# Patient Record
Sex: FEMALE | Race: WHITE | Employment: UNEMPLOYED
[De-identification: names, ages, dates, MRNs, and addresses within clinical notes are randomized per-mention and may not be internally consistent; named-entity substitution may affect disease eponyms.]

---

## 2017-02-10 ENCOUNTER — OFFICE VISIT (OUTPATIENT)
Dept: FAMILY MEDICINE CLINIC | Facility: CLINIC | Age: 67
End: 2017-02-10

## 2017-02-10 VITALS
TEMPERATURE: 97.4 F | WEIGHT: 202 LBS | HEIGHT: 66 IN | BODY MASS INDEX: 32.47 KG/M2 | DIASTOLIC BLOOD PRESSURE: 80 MMHG | SYSTOLIC BLOOD PRESSURE: 130 MMHG | OXYGEN SATURATION: 99 % | RESPIRATION RATE: 18 BRPM | HEART RATE: 74 BPM

## 2017-02-10 DIAGNOSIS — M76.892 HIP TENDONITIS, LEFT: ICD-10-CM

## 2017-02-10 DIAGNOSIS — Z76.89 ENCOUNTER TO ESTABLISH CARE: ICD-10-CM

## 2017-02-10 DIAGNOSIS — I10 ESSENTIAL HYPERTENSION: Primary | ICD-10-CM

## 2017-02-10 DIAGNOSIS — Z12.11 SCREENING FOR MALIGNANT NEOPLASM OF COLON: ICD-10-CM

## 2017-02-10 PROBLEM — M76.899 HIP TENDONITIS: Status: ACTIVE | Noted: 2017-02-10

## 2017-02-10 PROCEDURE — 99203 OFFICE O/P NEW LOW 30 MIN: CPT | Performed by: NURSE PRACTITIONER

## 2017-02-10 RX ORDER — LISINOPRIL AND HYDROCHLOROTHIAZIDE 20; 12.5 MG/1; MG/1
1 TABLET ORAL DAILY
Qty: 30 TABLET | Refills: 5 | Status: SHIPPED | OUTPATIENT
Start: 2017-02-10 | End: 2017-08-11 | Stop reason: SDUPTHER

## 2017-02-10 RX ORDER — LISINOPRIL AND HYDROCHLOROTHIAZIDE 20; 12.5 MG/1; MG/1
TABLET ORAL
Refills: 0 | COMMUNITY
Start: 2017-01-15 | End: 2017-02-10 | Stop reason: SDUPTHER

## 2017-02-10 RX ORDER — METHYLPREDNISOLONE 4 MG/1
TABLET ORAL
Qty: 1 KIT | Refills: 0 | Status: SHIPPED | OUTPATIENT
Start: 2017-02-10 | End: 2017-02-16

## 2017-02-10 ASSESSMENT — ENCOUNTER SYMPTOMS
CONSTIPATION: 0
BLOOD IN STOOL: 0
DIARRHEA: 0
COUGH: 0
EYE DISCHARGE: 0
SINUS PRESSURE: 0
ABDOMINAL PAIN: 0
CHEST TIGHTNESS: 0
SHORTNESS OF BREATH: 0
RHINORRHEA: 0

## 2017-02-10 ASSESSMENT — PATIENT HEALTH QUESTIONNAIRE - PHQ9
1. LITTLE INTEREST OR PLEASURE IN DOING THINGS: 0
2. FEELING DOWN, DEPRESSED OR HOPELESS: 0
SUM OF ALL RESPONSES TO PHQ QUESTIONS 1-9: 0
SUM OF ALL RESPONSES TO PHQ9 QUESTIONS 1 & 2: 0

## 2017-03-13 ENCOUNTER — OFFICE VISIT (OUTPATIENT)
Dept: FAMILY MEDICINE CLINIC | Age: 67
End: 2017-03-13

## 2017-03-13 VITALS
RESPIRATION RATE: 20 BRPM | SYSTOLIC BLOOD PRESSURE: 122 MMHG | OXYGEN SATURATION: 100 % | WEIGHT: 194 LBS | HEART RATE: 67 BPM | BODY MASS INDEX: 31.31 KG/M2 | DIASTOLIC BLOOD PRESSURE: 80 MMHG | TEMPERATURE: 98.2 F

## 2017-03-13 DIAGNOSIS — Z23 NEED FOR PNEUMOCOCCAL VACCINATION: ICD-10-CM

## 2017-03-13 DIAGNOSIS — M76.892 HIP TENDONITIS, LEFT: Primary | ICD-10-CM

## 2017-03-13 DIAGNOSIS — R10.13 EPIGASTRIC PAIN: ICD-10-CM

## 2017-03-13 PROCEDURE — 99213 OFFICE O/P EST LOW 20 MIN: CPT | Performed by: NURSE PRACTITIONER

## 2017-03-13 RX ORDER — CYCLOBENZAPRINE HCL 5 MG
5 TABLET ORAL EVERY 8 HOURS PRN
Qty: 30 TABLET | Refills: 1 | Status: SHIPPED | OUTPATIENT
Start: 2017-03-13 | End: 2017-06-01 | Stop reason: SDUPTHER

## 2017-03-13 RX ORDER — HYDROCODONE BITARTRATE AND ACETAMINOPHEN 5; 325 MG/1; MG/1
TABLET ORAL
Qty: 40 TABLET | Refills: 0 | Status: SHIPPED | OUTPATIENT
Start: 2017-03-13 | End: 2017-05-23 | Stop reason: SDUPTHER

## 2017-03-13 ASSESSMENT — ENCOUNTER SYMPTOMS: BACK PAIN: 1

## 2017-03-20 DIAGNOSIS — Z12.39 SCREENING FOR BREAST CANCER: Primary | ICD-10-CM

## 2017-03-23 ENCOUNTER — OFFICE VISIT (OUTPATIENT)
Dept: FAMILY MEDICINE CLINIC | Age: 67
End: 2017-03-23

## 2017-03-23 VITALS
RESPIRATION RATE: 18 BRPM | SYSTOLIC BLOOD PRESSURE: 127 MMHG | HEART RATE: 85 BPM | DIASTOLIC BLOOD PRESSURE: 78 MMHG | BODY MASS INDEX: 32.44 KG/M2 | WEIGHT: 201 LBS | OXYGEN SATURATION: 98 %

## 2017-03-23 DIAGNOSIS — M16.12 OSTEOARTHRITIS OF LEFT HIP, UNSPECIFIED OSTEOARTHRITIS TYPE: ICD-10-CM

## 2017-03-23 DIAGNOSIS — M76.892 HIP TENDONITIS, LEFT: Primary | ICD-10-CM

## 2017-03-23 PROCEDURE — 99213 OFFICE O/P EST LOW 20 MIN: CPT | Performed by: NURSE PRACTITIONER

## 2017-03-23 ASSESSMENT — ENCOUNTER SYMPTOMS
EYE DISCHARGE: 0
BLOOD IN STOOL: 0
COUGH: 0
ABDOMINAL PAIN: 0
CHEST TIGHTNESS: 0
CONSTIPATION: 0
DIARRHEA: 0
SINUS PRESSURE: 0
SHORTNESS OF BREATH: 0
RHINORRHEA: 0

## 2017-03-24 DIAGNOSIS — Z12.11 SCREENING FOR MALIGNANT NEOPLASM OF COLON: ICD-10-CM

## 2017-03-24 LAB
CONTROL: PRESENT
HEMOCCULT STL QL: NEGATIVE

## 2017-03-24 PROCEDURE — 82274 ASSAY TEST FOR BLOOD FECAL: CPT | Performed by: NURSE PRACTITIONER

## 2017-03-27 ENCOUNTER — TELEPHONE (OUTPATIENT)
Dept: FAMILY MEDICINE CLINIC | Age: 67
End: 2017-03-27

## 2017-03-28 ENCOUNTER — TELEPHONE (OUTPATIENT)
Dept: ORTHOPEDIC SURGERY | Age: 67
End: 2017-03-28

## 2017-04-17 DIAGNOSIS — M25.552 LEFT HIP PAIN: Primary | ICD-10-CM

## 2017-04-19 ENCOUNTER — OFFICE VISIT (OUTPATIENT)
Dept: ORTHOPEDIC SURGERY | Age: 67
End: 2017-04-19
Payer: MEDICARE

## 2017-04-19 DIAGNOSIS — M47.817 FACET ARTHRITIS OF LUMBOSACRAL REGION: ICD-10-CM

## 2017-04-19 DIAGNOSIS — M16.12 OSTEOARTHRITIS OF LEFT HIP, UNSPECIFIED OSTEOARTHRITIS TYPE: Primary | ICD-10-CM

## 2017-04-19 PROCEDURE — 1090F PRES/ABSN URINE INCON ASSESS: CPT | Performed by: ORTHOPAEDIC SURGERY

## 2017-04-19 PROCEDURE — 4040F PNEUMOC VAC/ADMIN/RCVD: CPT | Performed by: ORTHOPAEDIC SURGERY

## 2017-04-19 PROCEDURE — 1036F TOBACCO NON-USER: CPT | Performed by: ORTHOPAEDIC SURGERY

## 2017-04-19 PROCEDURE — G8427 DOCREV CUR MEDS BY ELIG CLIN: HCPCS | Performed by: ORTHOPAEDIC SURGERY

## 2017-04-19 PROCEDURE — 3014F SCREEN MAMMO DOC REV: CPT | Performed by: ORTHOPAEDIC SURGERY

## 2017-04-19 PROCEDURE — G8417 CALC BMI ABV UP PARAM F/U: HCPCS | Performed by: ORTHOPAEDIC SURGERY

## 2017-04-19 PROCEDURE — 99203 OFFICE O/P NEW LOW 30 MIN: CPT | Performed by: ORTHOPAEDIC SURGERY

## 2017-04-19 PROCEDURE — 3017F COLORECTAL CA SCREEN DOC REV: CPT | Performed by: ORTHOPAEDIC SURGERY

## 2017-04-19 RX ORDER — MELOXICAM 15 MG/1
15 TABLET ORAL DAILY
Qty: 30 TABLET | Refills: 3 | Status: SHIPPED | OUTPATIENT
Start: 2017-04-19 | End: 2017-08-04 | Stop reason: SDUPTHER

## 2017-04-19 ASSESSMENT — ENCOUNTER SYMPTOMS
ANAL BLEEDING: 0
BLOOD IN STOOL: 0
ABDOMINAL PAIN: 0
COLOR CHANGE: 0
NAUSEA: 0
COUGH: 0
RECTAL PAIN: 0
VOMITING: 0
WHEEZING: 0
DIARRHEA: 0
BACK PAIN: 0
CONSTIPATION: 0

## 2017-05-23 ENCOUNTER — OFFICE VISIT (OUTPATIENT)
Dept: FAMILY MEDICINE CLINIC | Age: 67
End: 2017-05-23
Payer: MEDICARE

## 2017-05-23 VITALS
RESPIRATION RATE: 18 BRPM | SYSTOLIC BLOOD PRESSURE: 122 MMHG | HEART RATE: 76 BPM | BODY MASS INDEX: 32.78 KG/M2 | HEIGHT: 66 IN | OXYGEN SATURATION: 96 % | WEIGHT: 204 LBS | DIASTOLIC BLOOD PRESSURE: 85 MMHG | TEMPERATURE: 97.6 F

## 2017-05-23 DIAGNOSIS — M16.12 OSTEOARTHRITIS OF LEFT HIP, UNSPECIFIED OSTEOARTHRITIS TYPE: Primary | ICD-10-CM

## 2017-05-23 DIAGNOSIS — J01.10 ACUTE NON-RECURRENT FRONTAL SINUSITIS: ICD-10-CM

## 2017-05-23 PROCEDURE — 99214 OFFICE O/P EST MOD 30 MIN: CPT | Performed by: NURSE PRACTITIONER

## 2017-05-23 RX ORDER — HYDROCODONE BITARTRATE AND ACETAMINOPHEN 5; 325 MG/1; MG/1
TABLET ORAL
Qty: 40 TABLET | Refills: 0 | Status: SHIPPED | OUTPATIENT
Start: 2017-05-23 | End: 2017-08-14 | Stop reason: SDUPTHER

## 2017-05-23 RX ORDER — AMOXICILLIN AND CLAVULANATE POTASSIUM 875; 125 MG/1; MG/1
1 TABLET, FILM COATED ORAL 2 TIMES DAILY
Qty: 20 TABLET | Refills: 0 | Status: SHIPPED | OUTPATIENT
Start: 2017-05-23 | End: 2017-06-02

## 2017-05-23 ASSESSMENT — ENCOUNTER SYMPTOMS
CONSTIPATION: 0
DIARRHEA: 0
SINUS PRESSURE: 1
COUGH: 0
CHEST TIGHTNESS: 0
RHINORRHEA: 1
ABDOMINAL PAIN: 0
BLOOD IN STOOL: 0
EYE DISCHARGE: 0
SHORTNESS OF BREATH: 0

## 2017-06-01 RX ORDER — CYCLOBENZAPRINE HCL 5 MG
TABLET ORAL
Qty: 30 TABLET | Refills: 1 | Status: SHIPPED | OUTPATIENT
Start: 2017-06-01 | End: 2018-01-30 | Stop reason: ALTCHOICE

## 2017-08-04 DIAGNOSIS — M16.12 OSTEOARTHRITIS OF LEFT HIP, UNSPECIFIED OSTEOARTHRITIS TYPE: ICD-10-CM

## 2017-08-06 RX ORDER — MELOXICAM 15 MG/1
TABLET ORAL
Qty: 30 TABLET | Refills: 3 | Status: SHIPPED | OUTPATIENT
Start: 2017-08-06 | End: 2017-11-30 | Stop reason: SDUPTHER

## 2017-08-11 RX ORDER — LISINOPRIL AND HYDROCHLOROTHIAZIDE 20; 12.5 MG/1; MG/1
TABLET ORAL
Qty: 30 TABLET | Refills: 5 | Status: SHIPPED | OUTPATIENT
Start: 2017-08-11 | End: 2017-12-11 | Stop reason: SDUPTHER

## 2017-08-14 ENCOUNTER — TELEPHONE (OUTPATIENT)
Dept: FAMILY MEDICINE CLINIC | Age: 67
End: 2017-08-14

## 2017-08-14 RX ORDER — HYDROCODONE BITARTRATE AND ACETAMINOPHEN 5; 325 MG/1; MG/1
TABLET ORAL
Qty: 40 TABLET | Refills: 0 | Status: SHIPPED | OUTPATIENT
Start: 2017-08-14 | End: 2017-09-30 | Stop reason: SDUPTHER

## 2017-08-21 ENCOUNTER — OFFICE VISIT (OUTPATIENT)
Dept: FAMILY MEDICINE CLINIC | Age: 67
End: 2017-08-21
Payer: MEDICARE

## 2017-08-21 VITALS
RESPIRATION RATE: 18 BRPM | OXYGEN SATURATION: 99 % | BODY MASS INDEX: 32.62 KG/M2 | DIASTOLIC BLOOD PRESSURE: 82 MMHG | WEIGHT: 203 LBS | TEMPERATURE: 97.5 F | HEIGHT: 66 IN | SYSTOLIC BLOOD PRESSURE: 138 MMHG | HEART RATE: 72 BPM

## 2017-08-21 DIAGNOSIS — Z00.00 HEALTHCARE MAINTENANCE: ICD-10-CM

## 2017-08-21 DIAGNOSIS — Z13.820 ENCOUNTER FOR SCREENING FOR OSTEOPOROSIS: ICD-10-CM

## 2017-08-21 DIAGNOSIS — Z23 ENCOUNTER FOR IMMUNIZATION: ICD-10-CM

## 2017-08-21 DIAGNOSIS — I10 ESSENTIAL HYPERTENSION: Primary | ICD-10-CM

## 2017-08-21 PROCEDURE — 90715 TDAP VACCINE 7 YRS/> IM: CPT | Performed by: NURSE PRACTITIONER

## 2017-08-21 PROCEDURE — 90688 IIV4 VACCINE SPLT 0.5 ML IM: CPT | Performed by: NURSE PRACTITIONER

## 2017-08-21 PROCEDURE — 90471 IMMUNIZATION ADMIN: CPT | Performed by: NURSE PRACTITIONER

## 2017-08-21 PROCEDURE — 3017F COLORECTAL CA SCREEN DOC REV: CPT | Performed by: NURSE PRACTITIONER

## 2017-08-21 PROCEDURE — 90670 PCV13 VACCINE IM: CPT | Performed by: NURSE PRACTITIONER

## 2017-08-21 PROCEDURE — G8400 PT W/DXA NO RESULTS DOC: HCPCS | Performed by: NURSE PRACTITIONER

## 2017-08-21 PROCEDURE — 1090F PRES/ABSN URINE INCON ASSESS: CPT | Performed by: NURSE PRACTITIONER

## 2017-08-21 PROCEDURE — G8427 DOCREV CUR MEDS BY ELIG CLIN: HCPCS | Performed by: NURSE PRACTITIONER

## 2017-08-21 PROCEDURE — G0008 ADMIN INFLUENZA VIRUS VAC: HCPCS | Performed by: NURSE PRACTITIONER

## 2017-08-21 PROCEDURE — 99213 OFFICE O/P EST LOW 20 MIN: CPT | Performed by: NURSE PRACTITIONER

## 2017-08-21 PROCEDURE — 1123F ACP DISCUSS/DSCN MKR DOCD: CPT | Performed by: NURSE PRACTITIONER

## 2017-08-21 PROCEDURE — 4040F PNEUMOC VAC/ADMIN/RCVD: CPT | Performed by: NURSE PRACTITIONER

## 2017-08-21 PROCEDURE — G8417 CALC BMI ABV UP PARAM F/U: HCPCS | Performed by: NURSE PRACTITIONER

## 2017-08-21 PROCEDURE — 1036F TOBACCO NON-USER: CPT | Performed by: NURSE PRACTITIONER

## 2017-08-21 PROCEDURE — 3014F SCREEN MAMMO DOC REV: CPT | Performed by: NURSE PRACTITIONER

## 2017-08-21 PROCEDURE — G0009 ADMIN PNEUMOCOCCAL VACCINE: HCPCS | Performed by: NURSE PRACTITIONER

## 2017-08-21 ASSESSMENT — ENCOUNTER SYMPTOMS
BLOOD IN STOOL: 0
CONSTIPATION: 0
COUGH: 0
SINUS PRESSURE: 0
RHINORRHEA: 0
SHORTNESS OF BREATH: 0
DIARRHEA: 0
ABDOMINAL PAIN: 0
CHEST TIGHTNESS: 0

## 2017-08-31 ENCOUNTER — HOSPITAL ENCOUNTER (OUTPATIENT)
Age: 67
Setting detail: SPECIMEN
Discharge: HOME | End: 2017-08-31
Payer: MEDICARE

## 2017-08-31 DIAGNOSIS — Z00.00 HEALTHCARE MAINTENANCE: ICD-10-CM

## 2017-08-31 LAB
ESTIMATED AVERAGE GLUCOSE: 117 MG/DL
HBA1C MFR BLD: 5.7 % (ref 4–6)
HEPATITIS C ANTIBODY: NONREACTIVE
HIV AG/AB: NONREACTIVE

## 2017-09-19 DIAGNOSIS — M81.0 OSTEOPOROSIS, UNSPECIFIED OSTEOPOROSIS TYPE, UNSPECIFIED PATHOLOGICAL FRACTURE PRESENCE: Primary | ICD-10-CM

## 2017-09-19 DIAGNOSIS — Z78.0 MENOPAUSE: ICD-10-CM

## 2017-09-21 ENCOUNTER — HOSPITAL ENCOUNTER (OUTPATIENT)
Dept: WOMENS IMAGING | Age: 67
Discharge: HOME | End: 2017-09-21
Payer: MEDICARE

## 2017-09-21 DIAGNOSIS — M81.0 OSTEOPOROSIS, UNSPECIFIED OSTEOPOROSIS TYPE, UNSPECIFIED PATHOLOGICAL FRACTURE PRESENCE: ICD-10-CM

## 2017-09-21 DIAGNOSIS — M85.80 OSTEOPENIA, UNSPECIFIED LOCATION: Primary | ICD-10-CM

## 2017-09-21 DIAGNOSIS — Z78.0 MENOPAUSE: ICD-10-CM

## 2017-09-21 PROCEDURE — 77080 DXA BONE DENSITY AXIAL: CPT

## 2017-09-21 RX ORDER — CALCIUM CARBONATE/VITAMIN D3 600 MG-10
1 TABLET ORAL DAILY
Qty: 30 TABLET | Refills: 2 | Status: SHIPPED | OUTPATIENT
Start: 2017-09-21 | End: 2017-12-11 | Stop reason: SDUPTHER

## 2017-09-29 ENCOUNTER — TELEPHONE (OUTPATIENT)
Dept: FAMILY MEDICINE CLINIC | Age: 67
End: 2017-09-29

## 2017-09-30 RX ORDER — HYDROCODONE BITARTRATE AND ACETAMINOPHEN 5; 325 MG/1; MG/1
TABLET ORAL
Qty: 20 TABLET | Refills: 0 | Status: SHIPPED | OUTPATIENT
Start: 2017-09-30 | End: 2017-11-07 | Stop reason: SDUPTHER

## 2017-10-11 ENCOUNTER — OFFICE VISIT (OUTPATIENT)
Dept: ORTHOPEDIC SURGERY | Age: 67
End: 2017-10-11
Payer: MEDICARE

## 2017-10-11 VITALS
WEIGHT: 210 LBS | HEIGHT: 66 IN | DIASTOLIC BLOOD PRESSURE: 96 MMHG | HEART RATE: 81 BPM | SYSTOLIC BLOOD PRESSURE: 151 MMHG | BODY MASS INDEX: 33.75 KG/M2

## 2017-10-11 DIAGNOSIS — M16.12 ARTHRITIS OF LEFT HIP: Primary | ICD-10-CM

## 2017-10-11 DIAGNOSIS — Z01.818 PRE-OP TESTING: ICD-10-CM

## 2017-10-11 PROCEDURE — G8417 CALC BMI ABV UP PARAM F/U: HCPCS | Performed by: ORTHOPAEDIC SURGERY

## 2017-10-11 PROCEDURE — 1123F ACP DISCUSS/DSCN MKR DOCD: CPT | Performed by: ORTHOPAEDIC SURGERY

## 2017-10-11 PROCEDURE — 99213 OFFICE O/P EST LOW 20 MIN: CPT | Performed by: ORTHOPAEDIC SURGERY

## 2017-10-11 PROCEDURE — G8427 DOCREV CUR MEDS BY ELIG CLIN: HCPCS | Performed by: ORTHOPAEDIC SURGERY

## 2017-10-11 PROCEDURE — 3017F COLORECTAL CA SCREEN DOC REV: CPT | Performed by: ORTHOPAEDIC SURGERY

## 2017-10-11 PROCEDURE — 1090F PRES/ABSN URINE INCON ASSESS: CPT | Performed by: ORTHOPAEDIC SURGERY

## 2017-10-11 PROCEDURE — G8484 FLU IMMUNIZE NO ADMIN: HCPCS | Performed by: ORTHOPAEDIC SURGERY

## 2017-10-11 PROCEDURE — 4040F PNEUMOC VAC/ADMIN/RCVD: CPT | Performed by: ORTHOPAEDIC SURGERY

## 2017-10-11 PROCEDURE — 1036F TOBACCO NON-USER: CPT | Performed by: ORTHOPAEDIC SURGERY

## 2017-10-11 PROCEDURE — 3014F SCREEN MAMMO DOC REV: CPT | Performed by: ORTHOPAEDIC SURGERY

## 2017-10-11 PROCEDURE — G8399 PT W/DXA RESULTS DOCUMENT: HCPCS | Performed by: ORTHOPAEDIC SURGERY

## 2017-10-11 ASSESSMENT — PROMIS GLOBAL HEALTH SCALE
SUM OF RESPONSES TO QUESTIONS 2, 4, 5, & 10: 13
IN THE PAST 7 DAYS, HOW WOULD YOU RATE YOUR PAIN ON AVERAGE [ON A SCALE FROM 0 (NO PAIN) TO 10 (WORST IMAGINABLE PAIN)]?: 8
IN GENERAL, HOW WOULD YOU RATE YOUR MENTAL HEALTH, INCLUDING YOUR MOOD AND YOUR ABILITY TO THINK [ON A SCALE OF 1 (POOR) TO 5 (EXCELLENT)]?: 5
IN GENERAL, PLEASE RATE HOW WELL YOU CARRY OUT YOUR USUAL SOCIAL ACTIVITIES (INCLUDES ACTIVITIES AT HOME, AT WORK, AND IN YOUR COMMUNITY, AND RESPONSIBILITIES AS A PARENT, CHILD, SPOUSE, EMPLOYEE, FRIEND, ETC) [ON A SCALE OF 1 (POOR) TO 5 (EXCELLENT)]?: 2
HOW IS THE PROMIS V1.1 BEING ADMINISTERED?: 0
IN GENERAL, WOULD YOU SAY YOUR HEALTH IS...[ON A SCALE OF 1 (POOR) TO 5 (EXCELLENT)]: 4
IN GENERAL, WOULD YOU SAY YOUR QUALITY OF LIFE IS...[ON A SCALE OF 1 (POOR) TO 5 (EXCELLENT)]: 2
IN GENERAL, HOW WOULD YOU RATE YOUR SATISFACTION WITH YOUR SOCIAL ACTIVITIES AND RELATIONSHIPS [ON A SCALE OF 1 (POOR) TO 5 (EXCELLENT)]?: 4
IN THE PAST 7 DAYS, HOW OFTEN HAVE YOU BEEN BOTHERED BY EMOTIONAL PROBLEMS, SUCH AS FEELING ANXIOUS, DEPRESSED, OR IRRITABLE [ON A SCALE FROM 1 (NEVER) TO 5 (ALWAYS)]?: 2
SUM OF RESPONSES TO QUESTIONS 3, 6, 7, & 8: 16
TO WHAT EXTENT ARE YOU ABLE TO CARRY OUT YOUR EVERYDAY PHYSICAL ACTIVITIES SUCH AS WALKING, CLIMBING STAIRS, CARRYING GROCERIES, OR MOVING A CHAIR [ON A SCALE OF 1 (NOT AT ALL) TO 5 (COMPLETELY)]?: 2
IN THE PAST 7 DAYS, HOW WOULD YOU RATE YOUR FATIGUE ON AVERAGE [ON A SCALE FROM 1 (NONE) TO 5 (VERY SEVERE)]?: 3
WHO IS THE PERSON COMPLETING THE PROMIS V1.1 SURVEY?: 0
IN GENERAL, HOW WOULD YOU RATE YOUR PHYSICAL HEALTH [ON A SCALE OF 1 (POOR) TO 5 (EXCELLENT)]?: 3

## 2017-10-11 ASSESSMENT — ENCOUNTER SYMPTOMS
CONSTIPATION: 0
NAUSEA: 0
COUGH: 0
DIARRHEA: 0

## 2017-10-11 ASSESSMENT — HOOS JR
LYING IN BED (TURNING OVER, MAINTAINING HIP POSITION): 4
GOING UP OR DOWN STAIRS: 3
SITTING: 1
HOOS JR RAW SCORE: 13
BENDING TO THE FLOOR TO PICK UP OBJECT: 1
RISING FROM SITTING: 1
WALKING ON UNEVEN SURFACE: 3

## 2017-10-11 ASSESSMENT — PATIENT HEALTH QUESTIONNAIRE - PHQ9
1. LITTLE INTEREST OR PLEASURE IN DOING THINGS: 0
2. FEELING DOWN, DEPRESSED OR HOPELESS: 0
SUM OF ALL RESPONSES TO PHQ9 QUESTIONS 1 & 2: 0
SUM OF ALL RESPONSES TO PHQ QUESTIONS 1-9: 0

## 2017-10-11 NOTE — PROGRESS NOTES
range of motion of the lumbosacral spine. Neuro: alert. oriented  Eyes: Extra-ocular muscles intact  Mouth: Oral mucosa moist. No perioral lesions  Pulm: Respirations unlabored and regular. Skin: warm, well perfused  Psych:   Patient has good fund of knowledge and displays understanging of exam, diagnosis, and plan. Assessment:      1. Arthritis of left hip    2. Pre-op testing       Plan: We had a long discussion about left total hip arthroplasty through an anterior approach. Specifically we talked about risks of the lateral femoral cutaneous nerve. She notes understanding and would like to proceed. The risks/benefits, alternatives, and potential complications have been discussed with the patient. We also had a long discussion regarding the procedure itself and the expectations of the recovery. All question and concerns were addressed. No guarantees were made. The patient was instructed to call our office if any questions or concerns. Follow up:Return 2 weeks post op.     Orders Placed This Encounter   Medications    mupirocin (BACTROBAN NASAL) 2 % nasal ointment     Sig: Take by Nasal route 2 times daily for 5 days     Dispense:  1 Tube     Refill:  0          Orders Placed This Encounter   Procedures    XR CHEST STANDARD (2 VW)     Standing Status:   Future     Standing Expiration Date:   10/31/2018     Order Specific Question:   Reason for exam:     Answer:   pre-op    CBC     Standing Status:   Future     Standing Expiration Date:   10/12/2018    Comprehensive Metabolic Panel     Standing Status:   Future     Standing Expiration Date:   2/8/2018    Urinalysis     Standing Status:   Future     Standing Expiration Date:   10/12/2018    Ambulatory referral to Physical Therapy     Referral Priority:   Routine     Referral Type:   Eval and Treat     Referral Reason:   Specialty Services Required     Number of Visits Requested:   1    EKG 12 Lead     Standing Status:   Future

## 2017-11-07 ENCOUNTER — OFFICE VISIT (OUTPATIENT)
Dept: FAMILY MEDICINE CLINIC | Age: 67
End: 2017-11-07
Payer: MEDICARE

## 2017-11-07 VITALS
TEMPERATURE: 97.3 F | HEIGHT: 66 IN | SYSTOLIC BLOOD PRESSURE: 131 MMHG | OXYGEN SATURATION: 97 % | BODY MASS INDEX: 33.97 KG/M2 | DIASTOLIC BLOOD PRESSURE: 76 MMHG | WEIGHT: 211.4 LBS | HEART RATE: 72 BPM

## 2017-11-07 DIAGNOSIS — R05.8 COUGH PRODUCTIVE OF PURULENT SPUTUM: Primary | ICD-10-CM

## 2017-11-07 DIAGNOSIS — M16.12 OSTEOARTHRITIS OF LEFT HIP, UNSPECIFIED OSTEOARTHRITIS TYPE: ICD-10-CM

## 2017-11-07 PROCEDURE — G8417 CALC BMI ABV UP PARAM F/U: HCPCS | Performed by: NURSE PRACTITIONER

## 2017-11-07 PROCEDURE — 1036F TOBACCO NON-USER: CPT | Performed by: NURSE PRACTITIONER

## 2017-11-07 PROCEDURE — 4040F PNEUMOC VAC/ADMIN/RCVD: CPT | Performed by: NURSE PRACTITIONER

## 2017-11-07 PROCEDURE — 3014F SCREEN MAMMO DOC REV: CPT | Performed by: NURSE PRACTITIONER

## 2017-11-07 PROCEDURE — 99213 OFFICE O/P EST LOW 20 MIN: CPT | Performed by: NURSE PRACTITIONER

## 2017-11-07 PROCEDURE — 1090F PRES/ABSN URINE INCON ASSESS: CPT | Performed by: NURSE PRACTITIONER

## 2017-11-07 PROCEDURE — G8399 PT W/DXA RESULTS DOCUMENT: HCPCS | Performed by: NURSE PRACTITIONER

## 2017-11-07 PROCEDURE — 1123F ACP DISCUSS/DSCN MKR DOCD: CPT | Performed by: NURSE PRACTITIONER

## 2017-11-07 PROCEDURE — G8427 DOCREV CUR MEDS BY ELIG CLIN: HCPCS | Performed by: NURSE PRACTITIONER

## 2017-11-07 PROCEDURE — 3017F COLORECTAL CA SCREEN DOC REV: CPT | Performed by: NURSE PRACTITIONER

## 2017-11-07 PROCEDURE — G8484 FLU IMMUNIZE NO ADMIN: HCPCS | Performed by: NURSE PRACTITIONER

## 2017-11-07 RX ORDER — HYDROCODONE BITARTRATE AND ACETAMINOPHEN 5; 325 MG/1; MG/1
TABLET ORAL
Qty: 14 TABLET | Refills: 0 | Status: ON HOLD | OUTPATIENT
Start: 2017-11-07 | End: 2017-11-15 | Stop reason: HOSPADM

## 2017-11-07 RX ORDER — AZITHROMYCIN 250 MG/1
TABLET, FILM COATED ORAL
Qty: 1 PACKET | Refills: 0 | Status: SHIPPED | OUTPATIENT
Start: 2017-11-07 | End: 2017-12-11 | Stop reason: ALTCHOICE

## 2017-11-07 ASSESSMENT — ENCOUNTER SYMPTOMS
SORE THROAT: 1
RHINORRHEA: 1
COUGH: 1
WHEEZING: 0
SHORTNESS OF BREATH: 0

## 2017-11-07 NOTE — PROGRESS NOTES
[address] GARCIA  [phone]    Yuli Yuen is a 74 y.o. female who presents today for her  medical conditions/complaints as noted below. Yuli Yuen is c/o of Cough (since 2 days.  having hip replacement 11/14/17); Nasal Congestion (PND and green); and Medication Refill (wants 7 days supply of norco until hip replacement)  . HPI:     Cough   This is a new problem. The cough is productive of purulent sputum. Associated symptoms include ear pain, nasal congestion, postnasal drip, rhinorrhea and a sore throat. Pertinent negatives include no chills, fever, shortness of breath or wheezing. Treatments tried: oral decongestant. The treatment provided mild relief. There is no history of asthma or COPD. She is having her hip replaced next Wednesday. She is ready. The pain is bad. She is walking with a cane. Nursing note reviewed and discussed with patient. Patient's medications, allergies, past medical, surgical, social and family histories were reviewed and updated as appropriate. Current Outpatient Prescriptions on File Prior to Visit   Medication Sig Dispense Refill    Calcium Carb-Cholecalciferol (CALCIUM-VITAMIN D) 600-400 MG-UNIT TABS Take 1 Dose by mouth daily 30 tablet 2    lisinopril-hydrochlorothiazide (PRINZIDE;ZESTORETIC) 20-12.5 MG per tablet take 1 tablet by mouth once daily 30 tablet 5    meloxicam (MOBIC) 15 MG tablet take 1 tablet by mouth once daily 30 tablet 3    cyclobenzaprine (FLEXERIL) 5 MG tablet take 1 tablet by mouth every 8 hours if needed for muscle spasm 30 tablet 1    Handicap Placard MISC by Does not apply route Duration 4- through 4- 1 each 0    [START ON 11/10/2017] mupirocin (BACTROBAN NASAL) 2 % nasal ointment Take by Nasal route 2 times daily for 5 days 1 Tube 0     No current facility-administered medications on file prior to visit.       Past Medical History: Diagnosis Date    Hypertension       Past Surgical History:   Procedure Laterality Date    CHOLECYSTECTOMY, LAPAROSCOPIC  2012     Family History   Problem Relation Age of Onset    Heart Disease Mother     Diabetes Father      Social History   Substance Use Topics    Smoking status: Never Smoker    Smokeless tobacco: Never Used    Alcohol use Yes      Comment: 1 glass per month      Allergies   Allergen Reactions    Morphine Other (See Comments)     Does not feeling       Subjective:      Review of Systems   Constitutional: Negative for chills and fever. HENT: Positive for ear pain, postnasal drip, rhinorrhea and sore throat. Respiratory: Positive for cough. Negative for shortness of breath and wheezing. Musculoskeletal: Positive for arthralgias (left hip). Other pertinent ROS in HPI  Objective:     /76 (Site: Right Arm, Position: Sitting, Cuff Size: Large Adult)   Pulse 72   Temp 97.3 °F (36.3 °C) (Oral)   Ht 5' 5.5\" (1.664 m)   Wt 211 lb 6.4 oz (95.9 kg)   SpO2 97%   BMI 34.64 kg/m²    Physical Exam   Constitutional: She is oriented to person, place, and time. She appears well-developed and well-nourished. No distress. HENT:   Head: Normocephalic and atraumatic. Right Ear: External ear normal.   Left Ear: External ear normal.   Nose: Nose normal.   Mouth/Throat: Oropharynx is clear and moist.   Eyes: Conjunctivae and EOM are normal. Pupils are equal, round, and reactive to light. Neck: Trachea normal, normal range of motion and full passive range of motion without pain. No thyroid mass present. Cardiovascular: Normal rate, regular rhythm, S1 normal, S2 normal and normal heart sounds. Exam reveals no distant heart sounds and no friction rub. Pulmonary/Chest: Effort normal and breath sounds normal. No accessory muscle usage. No respiratory distress. Abdominal: Soft. Bowel sounds are normal. She exhibits no distension, no ascites and no mass.    Musculoskeletal: Signed Prescriptions Disp Refills    HYDROcodone-acetaminophen (NORCO) 5-325 MG per tablet 14 tablet 0     Si tab every night prn. Earliest Fill Date: 17    azithromycin (ZITHROMAX) 250 MG tablet 1 packet 0     Sig: Take 2 tabs (500 mg) on Day 1, and take 1 tab (250 mg) on days 2 through 5.          Electronically signed by Hi Campo CNP on 2017 at 8:56 AM

## 2017-11-08 ENCOUNTER — HOSPITAL ENCOUNTER (OUTPATIENT)
Dept: GENERAL RADIOLOGY | Age: 67
Discharge: HOME | End: 2017-11-08
Payer: MEDICARE

## 2017-11-08 ENCOUNTER — HOSPITAL ENCOUNTER (OUTPATIENT)
Dept: PREADMISSION TESTING | Age: 67
Discharge: HOME | End: 2017-11-08
Payer: MEDICARE

## 2017-11-08 VITALS
WEIGHT: 211.64 LBS | DIASTOLIC BLOOD PRESSURE: 94 MMHG | HEART RATE: 75 BPM | BODY MASS INDEX: 34.01 KG/M2 | TEMPERATURE: 97.6 F | HEIGHT: 66 IN | RESPIRATION RATE: 18 BRPM | SYSTOLIC BLOOD PRESSURE: 132 MMHG | OXYGEN SATURATION: 98 %

## 2017-11-08 DIAGNOSIS — Z01.818 PRE-OP TESTING: ICD-10-CM

## 2017-11-08 LAB
-: NORMAL
ALBUMIN SERPL-MCNC: 4.6 G/DL (ref 3.5–5.2)
ALBUMIN/GLOBULIN RATIO: 1.5 (ref 1–2.5)
ALP BLD-CCNC: 97 U/L (ref 35–104)
ALT SERPL-CCNC: 35 U/L (ref 5–33)
AMORPHOUS: NORMAL
ANION GAP SERPL CALCULATED.3IONS-SCNC: 15 MMOL/L (ref 9–17)
AST SERPL-CCNC: 36 U/L
BACTERIA: NORMAL
BILIRUB SERPL-MCNC: 0.25 MG/DL (ref 0.3–1.2)
BILIRUBIN URINE: NEGATIVE
BUN BLDV-MCNC: 26 MG/DL (ref 8–23)
BUN/CREAT BLD: ABNORMAL (ref 9–20)
CALCIUM SERPL-MCNC: 10.2 MG/DL (ref 8.6–10.4)
CASTS UA: NORMAL /LPF (ref 0–8)
CHLORIDE BLD-SCNC: 98 MMOL/L (ref 98–107)
CO2: 25 MMOL/L (ref 20–31)
COLOR: YELLOW
COMMENT UA: ABNORMAL
CREAT SERPL-MCNC: 0.9 MG/DL (ref 0.5–0.9)
CRYSTALS, UA: NORMAL /HPF
EKG ATRIAL RATE: 73 BPM
EKG P AXIS: 39 DEGREES
EKG P-R INTERVAL: 172 MS
EKG Q-T INTERVAL: 396 MS
EKG QRS DURATION: 82 MS
EKG QTC CALCULATION (BAZETT): 436 MS
EKG R AXIS: 26 DEGREES
EKG T AXIS: 18 DEGREES
EKG VENTRICULAR RATE: 73 BPM
EPITHELIAL CELLS UA: NORMAL /HPF (ref 0–5)
GFR AFRICAN AMERICAN: >60 ML/MIN
GFR NON-AFRICAN AMERICAN: >60 ML/MIN
GFR SERPL CREATININE-BSD FRML MDRD: ABNORMAL ML/MIN/{1.73_M2}
GFR SERPL CREATININE-BSD FRML MDRD: ABNORMAL ML/MIN/{1.73_M2}
GLUCOSE BLD-MCNC: 86 MG/DL (ref 70–99)
GLUCOSE URINE: NEGATIVE
HCT VFR BLD CALC: 41.6 % (ref 36.3–47.1)
HEMOGLOBIN: 13.2 G/DL (ref 11.9–15.1)
KETONES, URINE: NEGATIVE
LEUKOCYTE ESTERASE, URINE: ABNORMAL
MCH RBC QN AUTO: 28 PG (ref 25.2–33.5)
MCHC RBC AUTO-ENTMCNC: 31.7 G/DL (ref 28.4–34.8)
MCV RBC AUTO: 88.1 FL (ref 82.6–102.9)
MUCUS: NORMAL
NITRITE, URINE: NEGATIVE
OTHER OBSERVATIONS UA: NORMAL
PDW BLD-RTO: 14.6 % (ref 11.8–14.4)
PH UA: 7 (ref 5–8)
PLATELET # BLD: 223 K/UL (ref 138–453)
PMV BLD AUTO: 12.1 FL (ref 8.1–13.5)
POTASSIUM SERPL-SCNC: 4.8 MMOL/L (ref 3.7–5.3)
PROTEIN UA: NEGATIVE
RBC # BLD: 4.72 M/UL (ref 3.95–5.11)
RBC UA: NORMAL /HPF (ref 0–4)
RENAL EPITHELIAL, UA: NORMAL /HPF
SODIUM BLD-SCNC: 138 MMOL/L (ref 135–144)
SPECIFIC GRAVITY UA: 1.01 (ref 1–1.03)
TOTAL PROTEIN: 7.7 G/DL (ref 6.4–8.3)
TRICHOMONAS: NORMAL
TURBIDITY: CLEAR
URINE HGB: NEGATIVE
UROBILINOGEN, URINE: NORMAL
WBC # BLD: 7.1 K/UL (ref 3.5–11.3)
WBC UA: NORMAL /HPF (ref 0–5)
YEAST: NORMAL

## 2017-11-08 PROCEDURE — 80053 COMPREHEN METABOLIC PANEL: CPT

## 2017-11-08 PROCEDURE — 71020 XR CHEST STANDARD TWO VW: CPT

## 2017-11-08 PROCEDURE — 81001 URINALYSIS AUTO W/SCOPE: CPT

## 2017-11-08 PROCEDURE — 36415 COLL VENOUS BLD VENIPUNCTURE: CPT

## 2017-11-08 PROCEDURE — 93005 ELECTROCARDIOGRAM TRACING: CPT

## 2017-11-08 PROCEDURE — 85027 COMPLETE CBC AUTOMATED: CPT

## 2017-11-08 RX ORDER — SODIUM CHLORIDE, SODIUM LACTATE, POTASSIUM CHLORIDE, CALCIUM CHLORIDE 600; 310; 30; 20 MG/100ML; MG/100ML; MG/100ML; MG/100ML
1000 INJECTION, SOLUTION INTRAVENOUS CONTINUOUS
Status: CANCELLED | OUTPATIENT
Start: 2017-11-08

## 2017-11-08 ASSESSMENT — PAIN DESCRIPTION - ORIENTATION: ORIENTATION: LEFT

## 2017-11-08 ASSESSMENT — PAIN DESCRIPTION - PAIN TYPE: TYPE: CHRONIC PAIN

## 2017-11-08 ASSESSMENT — PAIN SCALES - GENERAL: PAINLEVEL_OUTOF10: 5

## 2017-11-08 ASSESSMENT — PAIN DESCRIPTION - LOCATION: LOCATION: HIP

## 2017-11-08 NOTE — H&P
Diabetes in her brother and father; Heart Disease in her mother. Social History   reports that she has never smoked. She has never used smokeless tobacco.   reports that she drinks alcohol. Socially. reports that she does not use drugs. Marital Status   Occupation none    OBJECTIVE:   VITALS:  height is 5' 5.5\" (1.664 m) and weight is 211 lb 10.3 oz (96 kg). Her oral temperature is 97.6 °F (36.4 °C). Her blood pressure is 132/94 (abnormal) and her pulse is 75. Her respiration is 18 and oxygen saturation is 98%. CONSTITUTIONAL:alert & oriented x 3, no acute distress. Antalgic gait. Uses a cane for assistance. Very pleasant. SKIN:  Warm and dry, no rashes. HEAD:  Normocephalic, atraumatic   EYES: PERRL. EOMs intact. EARS:  Hearing grossly WNL. NOSE:  Nares patent. No rhinorrhea   MOUTH/THROAT:  benign  NECK:supple, no lymphadenopathy  LUNGS: Clear to auscultation bilaterally, no wheezes. CARDIOVASCULAR: Heart sounds are normal.  Regular rate and rhythm without murmur. ABDOMEN: soft, non tender, non distended. EXTREMITIES: no edema bilateral lower extremities. Testing:   EK17  Labs pending: drawn 2017   Chest XRay:  17    IMPRESSIONS:   1. Left hip DJD  2.  has a past medical history of Arthritis and Hypertension. PLANS:   1.  Left hip arthroplasty    KIMBERLI VALENTINO PA-C  Electronically signed 2017 at 3:15 PM

## 2017-11-10 ENCOUNTER — TELEPHONE (OUTPATIENT)
Dept: FAMILY MEDICINE CLINIC | Age: 67
End: 2017-11-10

## 2017-11-10 ENCOUNTER — PATIENT MESSAGE (OUTPATIENT)
Dept: FAMILY MEDICINE CLINIC | Age: 67
End: 2017-11-10

## 2017-11-10 NOTE — TELEPHONE ENCOUNTER
The pt is scheduled with us for Monday for surgery clearance at 10.30 am.  The pt asked me to contact you if she really needed to come in again, since she was just here. But, was she was seen on 11.7.2017 for a sick call. Does she need to come in again or not? Thanks. Had pre op testing done on 11.8.2017.

## 2017-11-12 ASSESSMENT — PROMIS GLOBAL HEALTH SCALE
IN GENERAL, HOW WOULD YOU RATE YOUR PHYSICAL HEALTH [ON A SCALE OF 1 (POOR) TO 5 (EXCELLENT)]?: 3
IN GENERAL, WOULD YOU SAY YOUR HEALTH IS...[ON A SCALE OF 1 (POOR) TO 5 (EXCELLENT)]: 3
SUM OF RESPONSES TO QUESTIONS 3, 6, 7, & 8: 15
IN GENERAL, HOW WOULD YOU RATE YOUR SATISFACTION WITH YOUR SOCIAL ACTIVITIES AND RELATIONSHIPS [ON A SCALE OF 1 (POOR) TO 5 (EXCELLENT)]?: 3
IN GENERAL, HOW WOULD YOU RATE YOUR MENTAL HEALTH, INCLUDING YOUR MOOD AND YOUR ABILITY TO THINK [ON A SCALE OF 1 (POOR) TO 5 (EXCELLENT)]?: 3
IN THE PAST 7 DAYS, HOW OFTEN HAVE YOU BEEN BOTHERED BY EMOTIONAL PROBLEMS, SUCH AS FEELING ANXIOUS, DEPRESSED, OR IRRITABLE [ON A SCALE FROM 1 (NEVER) TO 5 (ALWAYS)]?: 1
WHO IS THE PERSON COMPLETING THE PROMIS V1.1 SURVEY?: 0
IN GENERAL, WOULD YOU SAY YOUR QUALITY OF LIFE IS...[ON A SCALE OF 1 (POOR) TO 5 (EXCELLENT)]: 2
SUM OF RESPONSES TO QUESTIONS 2, 4, 5, & 10: 9
IN THE PAST 7 DAYS, HOW WOULD YOU RATE YOUR FATIGUE ON AVERAGE [ON A SCALE FROM 1 (NONE) TO 5 (VERY SEVERE)]?: 2
HOW IS THE PROMIS V1.1 BEING ADMINISTERED?: 2
IN THE PAST 7 DAYS, HOW WOULD YOU RATE YOUR PAIN ON AVERAGE [ON A SCALE FROM 0 (NO PAIN) TO 10 (WORST IMAGINABLE PAIN)]?: 8
IN GENERAL, PLEASE RATE HOW WELL YOU CARRY OUT YOUR USUAL SOCIAL ACTIVITIES (INCLUDES ACTIVITIES AT HOME, AT WORK, AND IN YOUR COMMUNITY, AND RESPONSIBILITIES AS A PARENT, CHILD, SPOUSE, EMPLOYEE, FRIEND, ETC) [ON A SCALE OF 1 (POOR) TO 5 (EXCELLENT)]?: 2
TO WHAT EXTENT ARE YOU ABLE TO CARRY OUT YOUR EVERYDAY PHYSICAL ACTIVITIES SUCH AS WALKING, CLIMBING STAIRS, CARRYING GROCERIES, OR MOVING A CHAIR [ON A SCALE OF 1 (NOT AT ALL) TO 5 (COMPLETELY)]?: 2

## 2017-11-12 ASSESSMENT — HOOS JR
RISING FROM SITTING: 0
WALKING ON UNEVEN SURFACE: 2
SITTING: 0
LYING IN BED (TURNING OVER, MAINTAINING HIP POSITION): 3
GOING UP OR DOWN STAIRS: 2
BENDING TO THE FLOOR TO PICK UP OBJECT: 1
HOOS JR RAW SCORE: 8

## 2017-11-14 ENCOUNTER — ANESTHESIA (OUTPATIENT)
Dept: OPERATING ROOM | Age: 67
DRG: 470 | End: 2017-11-14
Payer: MEDICARE

## 2017-11-14 ENCOUNTER — APPOINTMENT (OUTPATIENT)
Dept: GENERAL RADIOLOGY | Age: 67
DRG: 470 | End: 2017-11-14
Attending: ORTHOPAEDIC SURGERY
Payer: MEDICARE

## 2017-11-14 ENCOUNTER — ANESTHESIA EVENT (OUTPATIENT)
Dept: OPERATING ROOM | Age: 67
DRG: 470 | End: 2017-11-14
Payer: MEDICARE

## 2017-11-14 ENCOUNTER — HOSPITAL ENCOUNTER (INPATIENT)
Age: 67
LOS: 2 days | Discharge: HOME | DRG: 470 | End: 2017-11-16
Attending: ORTHOPAEDIC SURGERY | Admitting: ORTHOPAEDIC SURGERY
Payer: MEDICARE

## 2017-11-14 VITALS — TEMPERATURE: 94.9 F | DIASTOLIC BLOOD PRESSURE: 54 MMHG | SYSTOLIC BLOOD PRESSURE: 106 MMHG | OXYGEN SATURATION: 100 %

## 2017-11-14 DIAGNOSIS — Z96.642 STATUS POST TOTAL REPLACEMENT OF LEFT HIP: Primary | ICD-10-CM

## 2017-11-14 LAB — POC POTASSIUM: 4.1 MMOL/L (ref 3.5–4.5)

## 2017-11-14 PROCEDURE — 2720000010 HC SURG SUPPLY STERILE: Performed by: ORTHOPAEDIC SURGERY

## 2017-11-14 PROCEDURE — 6370000000 HC RX 637 (ALT 250 FOR IP): Performed by: ORTHOPAEDIC SURGERY

## 2017-11-14 PROCEDURE — 97535 SELF CARE MNGMENT TRAINING: CPT

## 2017-11-14 PROCEDURE — G8979 MOBILITY GOAL STATUS: HCPCS

## 2017-11-14 PROCEDURE — 6360000002 HC RX W HCPCS: Performed by: NURSE ANESTHETIST, CERTIFIED REGISTERED

## 2017-11-14 PROCEDURE — 73502 X-RAY EXAM HIP UNI 2-3 VIEWS: CPT

## 2017-11-14 PROCEDURE — 6360000002 HC RX W HCPCS: Performed by: ORTHOPAEDIC SURGERY

## 2017-11-14 PROCEDURE — 2580000003 HC RX 258: Performed by: ORTHOPAEDIC SURGERY

## 2017-11-14 PROCEDURE — 3600000014 HC SURGERY LEVEL 4 ADDTL 15MIN: Performed by: ORTHOPAEDIC SURGERY

## 2017-11-14 PROCEDURE — 1200000000 HC SEMI PRIVATE

## 2017-11-14 PROCEDURE — A6197 ALGINATE DRSG >16 <=48 SQ IN: HCPCS | Performed by: ORTHOPAEDIC SURGERY

## 2017-11-14 PROCEDURE — C1776 JOINT DEVICE (IMPLANTABLE): HCPCS | Performed by: ORTHOPAEDIC SURGERY

## 2017-11-14 PROCEDURE — 3700000000 HC ANESTHESIA ATTENDED CARE: Performed by: ORTHOPAEDIC SURGERY

## 2017-11-14 PROCEDURE — 2580000003 HC RX 258: Performed by: ANESTHESIOLOGY

## 2017-11-14 PROCEDURE — 2500000003 HC RX 250 WO HCPCS: Performed by: NURSE ANESTHETIST, CERTIFIED REGISTERED

## 2017-11-14 PROCEDURE — 0SRB0JA REPLACEMENT OF LEFT HIP JOINT WITH SYNTHETIC SUBSTITUTE, UNCEMENTED, OPEN APPROACH: ICD-10-PCS | Performed by: ORTHOPAEDIC SURGERY

## 2017-11-14 PROCEDURE — A4364 ADHESIVE, LIQUID OR EQUAL: HCPCS | Performed by: ORTHOPAEDIC SURGERY

## 2017-11-14 PROCEDURE — G8978 MOBILITY CURRENT STATUS: HCPCS

## 2017-11-14 PROCEDURE — 7100000001 HC PACU RECOVERY - ADDTL 15 MIN: Performed by: ORTHOPAEDIC SURGERY

## 2017-11-14 PROCEDURE — 97162 PT EVAL MOD COMPLEX 30 MIN: CPT

## 2017-11-14 PROCEDURE — G8987 SELF CARE CURRENT STATUS: HCPCS

## 2017-11-14 PROCEDURE — 27130 TOTAL HIP ARTHROPLASTY: CPT | Performed by: ORTHOPAEDIC SURGERY

## 2017-11-14 PROCEDURE — 6360000002 HC RX W HCPCS: Performed by: STUDENT IN AN ORGANIZED HEALTH CARE EDUCATION/TRAINING PROGRAM

## 2017-11-14 PROCEDURE — 64450 NJX AA&/STRD OTHER PN/BRANCH: CPT | Performed by: ANESTHESIOLOGY

## 2017-11-14 PROCEDURE — 97116 GAIT TRAINING THERAPY: CPT

## 2017-11-14 PROCEDURE — 6360000002 HC RX W HCPCS: Performed by: ANESTHESIOLOGY

## 2017-11-14 PROCEDURE — 2500000003 HC RX 250 WO HCPCS: Performed by: ORTHOPAEDIC SURGERY

## 2017-11-14 PROCEDURE — 6360000002 HC RX W HCPCS

## 2017-11-14 PROCEDURE — 84132 ASSAY OF SERUM POTASSIUM: CPT

## 2017-11-14 PROCEDURE — 7100000000 HC PACU RECOVERY - FIRST 15 MIN: Performed by: ORTHOPAEDIC SURGERY

## 2017-11-14 PROCEDURE — 3700000001 HC ADD 15 MINUTES (ANESTHESIA): Performed by: ORTHOPAEDIC SURGERY

## 2017-11-14 PROCEDURE — G8988 SELF CARE GOAL STATUS: HCPCS

## 2017-11-14 PROCEDURE — 97166 OT EVAL MOD COMPLEX 45 MIN: CPT

## 2017-11-14 PROCEDURE — 72170 X-RAY EXAM OF PELVIS: CPT

## 2017-11-14 PROCEDURE — 3600000004 HC SURGERY LEVEL 4 BASE: Performed by: ORTHOPAEDIC SURGERY

## 2017-11-14 DEVICE — HC PE LINER 28 / DMF
Type: IMPLANTABLE DEVICE | Status: FUNCTIONAL
Brand: DOUBLE MOBILITY LINER

## 2017-11-14 DEVICE — FEMORAL HEAD Ø 28 SIZE S
Type: IMPLANTABLE DEVICE | Status: FUNCTIONAL
Brand: COCR FEMORAL BALL HEAD

## 2017-11-14 DEVICE — DOUBLE MOBILITY ACETABULAR SHELL Ø52
Type: IMPLANTABLE DEVICE | Status: FUNCTIONAL
Brand: MPACT DOUBLE MOBILITY SHELLS

## 2017-11-14 DEVICE — COMPONENT TOT HIP CAPPED ADV LNR MTL CERM: Type: IMPLANTABLE DEVICE | Status: FUNCTIONAL

## 2017-11-14 DEVICE — AMISTEM H CEMENTLESS STEM STANDARD SIZE 4
Type: IMPLANTABLE DEVICE | Status: FUNCTIONAL
Brand: AMISTEM H FEMORAL STEMS

## 2017-11-14 RX ORDER — SODIUM CHLORIDE 0.9 % (FLUSH) 0.9 %
10 SYRINGE (ML) INJECTION PRN
Status: DISCONTINUED | OUTPATIENT
Start: 2017-11-14 | End: 2017-11-16 | Stop reason: HOSPADM

## 2017-11-14 RX ORDER — ROPIVACAINE HYDROCHLORIDE 2 MG/ML
30 INJECTION, SOLUTION EPIDURAL; INFILTRATION; PERINEURAL ONCE
Status: COMPLETED | OUTPATIENT
Start: 2017-11-14 | End: 2017-11-14

## 2017-11-14 RX ORDER — ASPIRIN 81 MG/1
81 TABLET ORAL 2 TIMES DAILY
Status: DISCONTINUED | OUTPATIENT
Start: 2017-11-15 | End: 2017-11-16 | Stop reason: HOSPADM

## 2017-11-14 RX ORDER — SODIUM CHLORIDE 9 MG/ML
125 INJECTION, SOLUTION INTRAVENOUS CONTINUOUS
Status: ACTIVE | OUTPATIENT
Start: 2017-11-14 | End: 2017-11-15

## 2017-11-14 RX ORDER — ONDANSETRON 2 MG/ML
4 INJECTION INTRAMUSCULAR; INTRAVENOUS EVERY 6 HOURS PRN
Status: DISCONTINUED | OUTPATIENT
Start: 2017-11-14 | End: 2017-11-16 | Stop reason: HOSPADM

## 2017-11-14 RX ORDER — ACETAMINOPHEN 325 MG/1
650 TABLET ORAL EVERY 4 HOURS PRN
Status: DISCONTINUED | OUTPATIENT
Start: 2017-11-14 | End: 2017-11-16

## 2017-11-14 RX ORDER — KETOROLAC TROMETHAMINE 30 MG/ML
30 INJECTION, SOLUTION INTRAMUSCULAR; INTRAVENOUS EVERY 6 HOURS PRN
Status: DISCONTINUED | OUTPATIENT
Start: 2017-11-14 | End: 2017-11-16 | Stop reason: HOSPADM

## 2017-11-14 RX ORDER — OXYCODONE HYDROCHLORIDE 5 MG/1
5 TABLET ORAL EVERY 4 HOURS PRN
Status: DISCONTINUED | OUTPATIENT
Start: 2017-11-14 | End: 2017-11-15

## 2017-11-14 RX ORDER — HYDROCHLOROTHIAZIDE 25 MG/1
12.5 TABLET ORAL DAILY
Status: DISCONTINUED | OUTPATIENT
Start: 2017-11-14 | End: 2017-11-16 | Stop reason: HOSPADM

## 2017-11-14 RX ORDER — DEXAMETHASONE SODIUM PHOSPHATE 10 MG/ML
INJECTION INTRAMUSCULAR; INTRAVENOUS PRN
Status: DISCONTINUED | OUTPATIENT
Start: 2017-11-14 | End: 2017-11-14 | Stop reason: SDUPTHER

## 2017-11-14 RX ORDER — PROMETHAZINE HYDROCHLORIDE 25 MG/ML
6.25 INJECTION, SOLUTION INTRAMUSCULAR; INTRAVENOUS
Status: DISCONTINUED | OUTPATIENT
Start: 2017-11-14 | End: 2017-11-14 | Stop reason: HOSPADM

## 2017-11-14 RX ORDER — FENTANYL CITRATE 50 UG/ML
INJECTION, SOLUTION INTRAMUSCULAR; INTRAVENOUS PRN
Status: DISCONTINUED | OUTPATIENT
Start: 2017-11-14 | End: 2017-11-14 | Stop reason: SDUPTHER

## 2017-11-14 RX ORDER — MIDAZOLAM HYDROCHLORIDE 1 MG/ML
INJECTION INTRAMUSCULAR; INTRAVENOUS PRN
Status: DISCONTINUED | OUTPATIENT
Start: 2017-11-14 | End: 2017-11-14 | Stop reason: SDUPTHER

## 2017-11-14 RX ORDER — PROPOFOL 10 MG/ML
INJECTION, EMULSION INTRAVENOUS CONTINUOUS PRN
Status: DISCONTINUED | OUTPATIENT
Start: 2017-11-14 | End: 2017-11-14 | Stop reason: SDUPTHER

## 2017-11-14 RX ORDER — LISINOPRIL AND HYDROCHLOROTHIAZIDE 20; 12.5 MG/1; MG/1
1 TABLET ORAL DAILY
Status: DISCONTINUED | OUTPATIENT
Start: 2017-11-14 | End: 2017-11-14

## 2017-11-14 RX ORDER — LIDOCAINE HYDROCHLORIDE 10 MG/ML
INJECTION, SOLUTION EPIDURAL; INFILTRATION; INTRACAUDAL; PERINEURAL PRN
Status: DISCONTINUED | OUTPATIENT
Start: 2017-11-14 | End: 2017-11-14 | Stop reason: SDUPTHER

## 2017-11-14 RX ORDER — MAGNESIUM HYDROXIDE 1200 MG/15ML
LIQUID ORAL CONTINUOUS PRN
Status: DISCONTINUED | OUTPATIENT
Start: 2017-11-14 | End: 2017-11-14 | Stop reason: HOSPADM

## 2017-11-14 RX ORDER — LISINOPRIL 20 MG/1
20 TABLET ORAL DAILY
Status: DISCONTINUED | OUTPATIENT
Start: 2017-11-14 | End: 2017-11-16 | Stop reason: HOSPADM

## 2017-11-14 RX ORDER — SODIUM CHLORIDE, SODIUM LACTATE, POTASSIUM CHLORIDE, CALCIUM CHLORIDE 600; 310; 30; 20 MG/100ML; MG/100ML; MG/100ML; MG/100ML
1000 INJECTION, SOLUTION INTRAVENOUS CONTINUOUS
Status: DISCONTINUED | OUTPATIENT
Start: 2017-11-14 | End: 2017-11-14

## 2017-11-14 RX ORDER — PREGABALIN 75 MG/1
75 CAPSULE ORAL 2 TIMES DAILY
Status: DISCONTINUED | OUTPATIENT
Start: 2017-11-14 | End: 2017-11-16 | Stop reason: HOSPADM

## 2017-11-14 RX ORDER — PROPOFOL 10 MG/ML
INJECTION, EMULSION INTRAVENOUS PRN
Status: DISCONTINUED | OUTPATIENT
Start: 2017-11-14 | End: 2017-11-14 | Stop reason: SDUPTHER

## 2017-11-14 RX ORDER — DOCUSATE SODIUM 100 MG/1
100 CAPSULE, LIQUID FILLED ORAL 2 TIMES DAILY
Status: DISCONTINUED | OUTPATIENT
Start: 2017-11-14 | End: 2017-11-16 | Stop reason: HOSPADM

## 2017-11-14 RX ORDER — ONDANSETRON 2 MG/ML
INJECTION INTRAMUSCULAR; INTRAVENOUS PRN
Status: DISCONTINUED | OUTPATIENT
Start: 2017-11-14 | End: 2017-11-14 | Stop reason: SDUPTHER

## 2017-11-14 RX ORDER — FENTANYL CITRATE 50 UG/ML
100 INJECTION, SOLUTION INTRAMUSCULAR; INTRAVENOUS ONCE
Status: COMPLETED | OUTPATIENT
Start: 2017-11-14 | End: 2017-11-14

## 2017-11-14 RX ORDER — ROPIVACAINE HYDROCHLORIDE 2 MG/ML
INJECTION, SOLUTION EPIDURAL; INFILTRATION; PERINEURAL
Status: COMPLETED
Start: 2017-11-14 | End: 2017-11-14

## 2017-11-14 RX ORDER — MIDAZOLAM HYDROCHLORIDE 1 MG/ML
2 INJECTION INTRAMUSCULAR; INTRAVENOUS ONCE
Status: COMPLETED | OUTPATIENT
Start: 2017-11-14 | End: 2017-11-14

## 2017-11-14 RX ORDER — SODIUM CHLORIDE 0.9 % (FLUSH) 0.9 %
10 SYRINGE (ML) INJECTION EVERY 12 HOURS SCHEDULED
Status: DISCONTINUED | OUTPATIENT
Start: 2017-11-14 | End: 2017-11-16 | Stop reason: HOSPADM

## 2017-11-14 RX ADMIN — PHENYLEPHRINE HYDROCHLORIDE 200 MCG: 10 INJECTION INTRAMUSCULAR; INTRAVENOUS; SUBCUTANEOUS at 08:00

## 2017-11-14 RX ADMIN — FENTANYL CITRATE 100 MCG: 50 INJECTION INTRAMUSCULAR; INTRAVENOUS at 07:03

## 2017-11-14 RX ADMIN — Medication 2 G: at 07:40

## 2017-11-14 RX ADMIN — LISINOPRIL 20 MG: 20 TABLET ORAL at 12:46

## 2017-11-14 RX ADMIN — HYDROCHLOROTHIAZIDE 12.5 MG: 25 TABLET ORAL at 12:45

## 2017-11-14 RX ADMIN — FENTANYL CITRATE 25 MCG: 50 INJECTION INTRAMUSCULAR; INTRAVENOUS at 09:05

## 2017-11-14 RX ADMIN — PROPOFOL 75 MCG/KG/MIN: 10 INJECTION, EMULSION INTRAVENOUS at 07:30

## 2017-11-14 RX ADMIN — KETOROLAC TROMETHAMINE 30 MG: 30 INJECTION, SOLUTION INTRAMUSCULAR; INTRAVENOUS at 11:17

## 2017-11-14 RX ADMIN — SODIUM CHLORIDE 125 ML/HR: 9 INJECTION, SOLUTION INTRAVENOUS at 11:19

## 2017-11-14 RX ADMIN — FENTANYL CITRATE 25 MCG: 50 INJECTION INTRAMUSCULAR; INTRAVENOUS at 09:09

## 2017-11-14 RX ADMIN — LIDOCAINE HYDROCHLORIDE 50 MG: 10 INJECTION, SOLUTION EPIDURAL; INFILTRATION; INTRACAUDAL; PERINEURAL at 07:30

## 2017-11-14 RX ADMIN — PHENYLEPHRINE HYDROCHLORIDE 200 MCG: 10 INJECTION INTRAMUSCULAR; INTRAVENOUS; SUBCUTANEOUS at 09:09

## 2017-11-14 RX ADMIN — Medication 20 ML: at 07:04

## 2017-11-14 RX ADMIN — TRANEXAMIC ACID 1 G: 100 INJECTION, SOLUTION INTRAVENOUS at 09:22

## 2017-11-14 RX ADMIN — DEXAMETHASONE SODIUM PHOSPHATE 10 MG: 10 INJECTION INTRAMUSCULAR; INTRAVENOUS at 07:35

## 2017-11-14 RX ADMIN — Medication 2 G: at 16:29

## 2017-11-14 RX ADMIN — ROPIVACAINE HYDROCHLORIDE 20 ML: 2 INJECTION, SOLUTION EPIDURAL; INFILTRATION; PERINEURAL at 07:04

## 2017-11-14 RX ADMIN — PHENYLEPHRINE HYDROCHLORIDE 200 MCG: 10 INJECTION INTRAMUSCULAR; INTRAVENOUS; SUBCUTANEOUS at 08:35

## 2017-11-14 RX ADMIN — PHENYLEPHRINE HYDROCHLORIDE 200 MCG: 10 INJECTION INTRAMUSCULAR; INTRAVENOUS; SUBCUTANEOUS at 08:48

## 2017-11-14 RX ADMIN — PROPOFOL 80 MG: 10 INJECTION, EMULSION INTRAVENOUS at 07:30

## 2017-11-14 RX ADMIN — DOCUSATE SODIUM 100 MG: 100 CAPSULE ORAL at 22:33

## 2017-11-14 RX ADMIN — PREGABALIN 75 MG: 75 CAPSULE ORAL at 12:45

## 2017-11-14 RX ADMIN — ONDANSETRON 4 MG: 2 INJECTION, SOLUTION INTRAMUSCULAR; INTRAVENOUS at 07:35

## 2017-11-14 RX ADMIN — DOCUSATE SODIUM 100 MG: 100 CAPSULE ORAL at 12:46

## 2017-11-14 RX ADMIN — PHENYLEPHRINE HYDROCHLORIDE 200 MCG: 10 INJECTION INTRAMUSCULAR; INTRAVENOUS; SUBCUTANEOUS at 09:02

## 2017-11-14 RX ADMIN — SODIUM CHLORIDE, POTASSIUM CHLORIDE, SODIUM LACTATE AND CALCIUM CHLORIDE: 600; 310; 30; 20 INJECTION, SOLUTION INTRAVENOUS at 09:25

## 2017-11-14 RX ADMIN — SODIUM CHLORIDE, POTASSIUM CHLORIDE, SODIUM LACTATE AND CALCIUM CHLORIDE: 600; 310; 30; 20 INJECTION, SOLUTION INTRAVENOUS at 07:00

## 2017-11-14 RX ADMIN — PHENYLEPHRINE HYDROCHLORIDE 200 MCG: 10 INJECTION INTRAMUSCULAR; INTRAVENOUS; SUBCUTANEOUS at 08:18

## 2017-11-14 RX ADMIN — MIDAZOLAM HYDROCHLORIDE 1 MG: 1 INJECTION, SOLUTION INTRAMUSCULAR; INTRAVENOUS at 08:30

## 2017-11-14 RX ADMIN — TRANEXAMIC ACID 1 G: 100 INJECTION, SOLUTION INTRAVENOUS at 07:30

## 2017-11-14 RX ADMIN — MIDAZOLAM HYDROCHLORIDE 2 MG: 1 INJECTION, SOLUTION INTRAMUSCULAR; INTRAVENOUS at 07:03

## 2017-11-14 RX ADMIN — MIDAZOLAM HYDROCHLORIDE 1 MG: 1 INJECTION, SOLUTION INTRAMUSCULAR; INTRAVENOUS at 07:40

## 2017-11-14 ASSESSMENT — PAIN SCALES - GENERAL
PAINLEVEL_OUTOF10: 0
PAINLEVEL_OUTOF10: 0
PAINLEVEL_OUTOF10: 6
PAINLEVEL_OUTOF10: 0
PAINLEVEL_OUTOF10: 5
PAINLEVEL_OUTOF10: 0

## 2017-11-14 ASSESSMENT — PAIN - FUNCTIONAL ASSESSMENT: PAIN_FUNCTIONAL_ASSESSMENT: 0-10

## 2017-11-14 ASSESSMENT — LIFESTYLE VARIABLES: SMOKING_STATUS: 0

## 2017-11-14 NOTE — PROGRESS NOTES
PROTOCOLS  NURSING IMPLEMENTED    TOTAL JOINT DVT/PE  VENOUS THROMBOEMBOLISM PROPHYLAXIS  (Nursing Automatically Implement)    Krishan Cherry  [id number]  [unfilled]  11/14/17    YES DVT RISK FACTOR SCORE YES MAJOR BLEEDING RISK FACTORS SCORE     [x] 48years old or greater (1)   [] Hx. Easy Bleeding (1)      [] Heart failure (2)   [] NSAID Use in Last 5 Days (2)      [] Varicose veins - Hx. (1)   [] Gastrointestinal or Genitourinary bleeding in Last 14 Days (2)      [] Myocardial Infarction - Hx. (1)         [] Cancer - Hx. (2)         [] Atrial fibrillation - Hx. (1)         [] Ischemic Stroke - Hx. (1)         [] Diabetes Mellitus - Hx. (1)         [] Previous DVT/PE - Hx. (2)         [] Hormone Replacement Therapy (1)         [x] Obesity (1)         [] Paralysis (1)         [] Pregnancy (1)         [] Smoking (1)                   [] Thromophilia (1)   []   Mild to Moderate Bleeding (2)      [x] Total Hip Arthroplasty (1)   [] Active Bleeding (4)      [] Family history of PE or DVT? (4) (Consider the following labs to test for presence of inhibitor deficiency state:) Factor V Leiden, Prothrombin Gene Mutation, Protein S Deficiency, Protien C Deficiency, Antithrombin Deficiency   [] Malignant Hypertension (2)        [] Thrombocytopenia 20k to 100k (2)        [] Thrombocytopenia less than 20k (4)        [] Bleeding Diathesis (4)        [] \"Bloody Stick\" Epidural or Spinal (2)     TOTAL DVT SCORE   TOTAL BLEEDING SCORE      [x] CLASS A   Standard Risk DVT (0-3)    [x] CLASS X Standard Risk Bleeding (0-4)      [] CLASS B Elevated Risk DVT (greater than 3)    [] CLASS Y High Risk Bleeding (greater than 4)     FINAL MATRIX (e.g. AY)       *If allergic to ASA use Warfarin  *BY patient consider no treatment  **Consider venous filter with high risk PE  -If on Coumadin pre-op, then restart night of surgery      [x]  DVT Prophylaxis: Class AX, AY - Baby Aspirin 81 mg by mouth BID (twice daily)  starting day of surgery.

## 2017-11-14 NOTE — PROGRESS NOTES
Latosha RN on 2C called and updated on pts bladder scan and updated to monitor pts bladder volume while spinal is wearing off per Dr. Teresa Robertson instructions.

## 2017-11-14 NOTE — ANESTHESIA PROCEDURE NOTES
Peripheral Block    Patient location during procedure: pre-op  Start time: 11/14/2017 6:55 AM  End time: 11/14/2017 7:10 AM  Staffing  Anesthesiologist: Evaristo Freed  Performed: anesthesiologist   Preanesthetic Checklist  Completed: patient identified, site marked, surgical consent, pre-op evaluation, timeout performed, IV checked, risks and benefits discussed, monitors and equipment checked, anesthesia consent given, oxygen available and patient being monitored  Peripheral Block  Patient position: right lateral decubitus  Prep: ChloraPrep  Patient monitoring: cardiac monitor, continuous pulse ox, frequent blood pressure checks and IV access  Block type: Lumbar plexus  Laterality: left  Injection technique: single-shot  Procedures: nerve stimulator  Local infiltration: lidocaine  Infiltration strength: 1 %  Dose: 3 mL  Provider prep: mask and sterile gloves  Local infiltration: lidocaine  Needle  Needle type: Sree   Needle gauge: 21 G  Needle length: 10 cm  Needle localization: anatomical landmarks and nerve stimulator  Test dose: negative  Assessment  Injection assessment: negative aspiration for heme, no paresthesia on injection and local visualized surrounding nerve on ultrasound  Paresthesia pain: none  Slow fractionated injection: yes  Hemodynamics: stable  Additional Notes  Immediately prior to procedure a \"time out\" was called to verify the correct patient, allergies, laterality, procedure and equipment. Time out performed with PACU RN    Local Anesthetic: 0.2 %  Ropivacaine   Amount: 20 ml  in 5 ml increments after negative aspiration each time.         Reason for block: post-op pain management and at surgeon's request

## 2017-11-14 NOTE — CARE COORDINATION
Joint Replacement Navigator Daily Rounding Note    Admission Date:   11/14/2017 Brianne Speaker is a 74 y.o.  female    Post Op Day # IMMEDIATELY POST OP    Labs:         No results for input(s): WBC, HGB, PLT in the last 72 hours. No results for input(s): NA, K, CL, CO2, BUN, CREATININE, GLUCOSE in the last 72 hours. Met with:     Patient and Family  Patient's LOC:   alert and oriented X3  Patient progress   STILL numb from spinal, moving feet, no feeling  Patient's Pain:   Patient rates pain no  Oral Intake:    Just ordered food  Output:    having difficulty voiding   Graham in:   no  ON-Q:    no    Walker/DME:  Walker/DME needed:  No  DME needed:    Does not need   DME Agency:         Anticoagulation:  Anticoagulation:  Asa 81 mg bid  Prescription in chart:  no       Discharge Planning:  Confirmed with patient that discharge plan is Outpatient Therapy. Agency/Facility chosen: [facility] and has 2 appts  Awaiting pre-certification no  Anticipated discharge date:   11/16  Patient's questions and concerns were answered. Actively listened and provided reassurance.      Electronically signed by: Dean Clemons RN on 11/14/2017 at 1:05 PM

## 2017-11-14 NOTE — PROGRESS NOTES
Physical Therapy    Facility/Department: [address] ORTHO/MED SURG  Initial Assessment    NAME: Chan Abarca  : 1950  MRN: [id number]     Copied from orthopedic surgery note filed 17 at 9:31am:  Pre-operative Diagnosis: left hip DJD  Post-operative Diagnosis: Same  Procedure: Left KARLA, uncemented  Anesthesia: MAC and Spinal    Date of Service: 2017    Patient Diagnosis(es): The encounter diagnosis was Status post total replacement of left hip.     has a past medical history of Arthritis and Hypertension. has a past surgical history that includes Cholecystectomy, laparoscopic (); Tubal ligation; Dilation and curettage of uterus (); Strabismus surgery (Bilateral); and Hip Arthroplasty (Left). Restrictions  Restrictions/Precautions  Restrictions/Precautions: Weight Bearing, Fall Risk, General Precautions  Required Braces or Orthoses?: No  Lower Extremity Weight Bearing Restrictions  Left Lower Extremity Weight Bearing: Weight Bearing As Tolerated  Position Activity Restriction  Hip Precautions:  (Anterior approach: no straight leg raises)  Other position/activity restrictions: L KARLA on  - anterior approach, no SLR  Vision/Hearing  Vision: Within Functional Limits  Hearing: Within functional limits     Subjective  General  Patient assessed for rehabilitation services?: Yes  Response To Previous Treatment: Not applicable  Family / Caregiver Present: No  Follows Commands: Within Functional Limits  General Comment  Comments: OT co-eval  Subjective  Subjective: RN and pt agreeable to PT.  Pt alert in bed upon arrival.  Pain Screening  Patient Currently in Pain: Denies  Vital Signs  Patient Currently in Pain: Denies  Pre Treatment Pain Screening  Intervention List: Patient able to continue with treatment    Orientation  Orientation  Overall Orientation Status: Within Normal Limits    Social/Functional History  Social/Functional History  Lives With: Spouse  Type of Home: [facility] Layout: One level  Home Access: Stairs to enter without rails, Stairs to enter with rails  Entrance Stairs - Number of Steps: 3  Entrance Stairs - Rails: Both  Bathroom Shower/Tub: Tub/Shower unit  Bathroom Toilet: Standard  Bathroom Equipment: Shower chair, Tub transfer bench, Grab bars in shower, Grab bars around toilet  Bathroom Accessibility: Accessible  Home Equipment: Rolling walker, [employer] Help From: Family (Pt reports supportive spouse)  ADL Assistance: Independent  Homemaking Assistance: Independent  Homemaking Responsibilities: Yes  Meal Prep Responsibility: Primary  Laundry Responsibility: Primary  Cleaning Responsibility: Primary  Bill Paying/Finance Responsibility: Primary  Shopping Responsibility: Primary  Health Care Management: Primary  Ambulation Assistance: Independent  Transfer Assistance: Independent  Active : Yes  Mode of Transportation: Car  Occupation: Retired  Objective          AROM RLE (degrees)  RLE AROM: WFL  AROM LLE (degrees)  LLE AROM : WFL  AROM RUE (degrees)  RUE AROM : WFL  AROM LUE (degrees)  LUE AROM : WFL  Strength RLE  Strength RLE: WFL  Strength LLE  Strength LLE: WFL  Strength RUE  Strength RUE: WFL  Strength LUE  Strength LUE: WFL     Sensation  Overall Sensation Status: Impaired (nerve block, but sensation returning)  Bed mobility  Supine to Sit: Stand by assistance  Sit to Supine: Stand by assistance  Scooting: Stand by assistance  Transfers  Sit to Stand: Minimal Assistance (verbal cueing on hand placement)  Stand to sit: Minimal Assistance (verbal cueing on hand placement)  Ambulation  Ambulation?: Yes  Ambulation 1  Surface: level tile  Device: Rolling Walker  Assistance: Contact guard assistance  Quality of Gait: steady, decreased step length but intentional d/t nerve block, slowed maciel  Distance: 10'  Comments: Pt with no complaints of buckling or any dizzines in any position throughout  Stairs/Curb  Stairs?: No     Balance  Posture: Good  Sitting - Static: Good  Sitting - Dynamic: Good  Standing - Static: Good (RW)  Standing - Dynamic: Good;- (RW)  Exercises  Quad Sets: 12x on R  Heelslides: R 10x   Gluteal Sets: 12x Bilat  Knee Long Arc Quad: 12x Bilat  Ankle Pumps: 12x Bilat     Assessment   Body structures, Functions, Activity limitations: Decreased functional mobility ; Decreased sensation  Assessment: amb 10' RW CGA. Pt noted ongoing decreased sensation in LLE but able to move it well. Pt limited by fatigue, sensation. Home assist prn. Prognosis: Good  Decision Making: Medium Complexity  Patient Education: PT POC  REQUIRES PT FOLLOW UP: Yes  Activity Tolerance  Activity Tolerance: Patient Tolerated treatment well  PT Equipment Recommendations  Other: likely not, pt has RW available     Discharge Recommendations:  Continue to assess pending progress, Home with assist PRN      Plan   Plan  Times per week: 7x/wk BID  Current Treatment Recommendations: Strengthening, Transfer Training, Functional Mobility Training, Balance Training, Endurance Training, Gait Training, Stair training  Safety Devices  Type of devices: Call light within reach, Left in chair, Gait belt, Nurse notified (spouse in room)  Restraints  Initially in place: No    G-Code  PT G-Codes  Functional Assessment Tool Used: [place] tool  Score: 16  Functional Limitation: Mobility: Walking and moving around  Mobility: Walking and Moving Around Current Status (): At least 40 percent but less than 60 percent impaired, limited or restricted  Mobility: Walking and Moving Around Goal Status ():  At least 20 percent but less than 40 percent impaired, limited or restricted  OutComes Score                                           Goals  Short term goals  Time Frame for Short term goals: 14 visits  Short term goal 1: Pt will be I with bed mobility  Short term goal 2: Pt will be I with transfers  Short term goal 3: Pt will be I with amb 300' without AD  Short term goal 4: Pt will navigate 4 steps

## 2017-11-14 NOTE — BRIEF OP NOTE
Brief Postoperative Note    Blanka Gonzáles  YOB: 1950  [id number]    Pre-operative Diagnosis: left hip DJD    Post-operative Diagnosis: Same    Procedure: Left KARLA, uncemented    Anesthesia: MAC and Spinal    Surgeons/Assistants: Sharon Marinelli MSIV    Estimated Blood Loss: 150cc    Fluids: 1000cc crystalloid    Complications: None    Implants: Medacta size 4 femur, 52mm acetabulum, 28mm head with -3.5mm offset, 52mm dual mobility liner     Specimens: Was Not Obtained    Findings: left hip DJD, see op note    Electronically signed by Jose G Tolliver DO on 11/14/2017 at 9:28 AM

## 2017-11-14 NOTE — CARE COORDINATION
Case Management Initial Discharge [address],         Readmission Risk              Readmission Risk:        8.25       Age 74 or Greater:  1    Admitted from SNF or Requires Paid or Family Care:  0    Currently has CHF,COPD,ARF,CRI,or is on dialysis:  0    Takes more than 5 Prescription Medications:  4    Takes Digoxin,Insulin,Anticoagulants,Narcotics or ASA/Plavix:  [facility] in Past 12 Months:  0    On Disability:  0    Patient Considers own Health:  1.25            Met with:patient to discuss discharge plans. Information verified: address, contacts, phone number, , insurance Yes  PCP: Whitney Saha CNP  Date of last visit: 1 week ago    Insurance Provider: Kimmie MAXWELL [facility]    Discharge Planning  Current Residence:  Private Residence  Living Arrangements:  Spouse/Significant Other   Home has 1 stories/4 stairs to climb  Support Systems:  Spouse/Significant Other  Current Services PTA:  None Supplier: none  Patient able to perform ADL's:Independent  DME used to aid ambulation prior to admission: cane /during admission walker    Potential Assistance Needed:  [address]: [address] [place] [address] on [facility] Purchasing Medications:  No  Does patient want to participate in local refill/ meds to beds program?  No    Patient agreeable to home care: No  [place] of choice provided:  n/a      Type of Home Care Services:  None  Patient expects to be discharged to:  home    Prior SNF/Rehab Placement and Facility: no  Agreeable to SNF/Rehab: No  [place] of choice provided: n/a   Evaluation: no    Expected Discharge date:  17  Follow Up Appointment: Best Day/ Time: Wednesday AM    Transportation provider:    Transportation arrangements needed for discharge: No    Discharge Plan: Patient is planning on Outpatient Physical therapy at discharge and has her first 2 appointments made.   Patient denies the need for home care

## 2017-11-14 NOTE — ANESTHESIA PROCEDURE NOTES
Spinal Block    Patient location during procedure: OR  Start time: 11/14/2017 7:21 AM  End time: 11/14/2017 7:27 AM  Reason for block: procedure for pain and primary anesthetic  Staffing  Anesthesiologist: Jose De Jesus Thomas  Resident/CRNA: Ana Addison  Performed: anesthesiologist   Preanesthetic Checklist  Completed: patient identified, site marked, surgical consent, pre-op evaluation, timeout performed, IV checked, risks and benefits discussed, monitors and equipment checked, anesthesia consent given, oxygen available and patient being monitored  Spinal Block  Patient position: sitting  Prep: Betadine  Patient monitoring: cardiac monitor, continuous pulse ox, continuous capnometry and frequent blood pressure checks  Approach: midline  Location: L3/L4  Procedures: paresthesia technique  Provider prep: mask and sterile gloves  Local infiltration: lidocaine  Agent: bupivacaine  Dose: 1.6  Dose: 1.6  Needle  Needle type: Ha   Needle gauge: 22 G  Needle length: 3.5 in  Needle insertion depth: 6 cm  Kit: [id number]  Lot number: [id number]  Expiration date: 12/31/2018  Assessment  Sensory level: T4  Swirl obtained: Yes  CSF: clear  Attempts: 2  Hemodynamics: stable  Additional Notes  Sterile technique, full monitors with VSS throughout procedure. First attempt unsuccessful due to return of blood into spinal needle. Second attempt successful with return of clear CSF with swirl.

## 2017-11-14 NOTE — ANESTHESIA PRE PROCEDURE
Intravenous Once Conner [facility], DO        tranexamic acid (CYKLOKAPRON) 1,000 mg in dextrose 5 % 100 mL IVPB  1,000 mg Intravenous Once Conner Amanda DO        ceFAZolin (ANCEF) 2 g in sterile water 20 mL IV syringe  2 g Intravenous [facility]           Allergies: Allergies   Allergen Reactions    Morphine Other (See Comments)     Does not like how it makes her feel       Problem List:    Patient Active Problem List   Diagnosis Code    Essential hypertension I10    Hip tendonitis M76.899    Osteoarthritis of left hip M16.12    Osteopenia M85.80       Past Medical History:        Diagnosis Date    Arthritis     osteoarthritis    Hypertension        Past Surgical History:        Procedure Laterality Date    CHOLECYSTECTOMY, LAPAROSCOPIC  2012    DILATION AND CURETTAGE OF UTERUS  1982    STRABISMUS SURGERY Bilateral     as child (lazy eye?? )    TUBAL LIGATION         Social History:    Social History   Substance Use Topics    Smoking status: Never Smoker    Smokeless tobacco: Never Used    Alcohol use Yes      Comment: 1 glass per month                                Counseling given: Not Answered      Vital Signs (Current):   Vitals:    11/14/17 0555   Weight: 209 lb 7 oz (95 kg)   Height: 5' 5.5\" (1.664 m)                                              BP Readings from Last 3 Encounters:   11/08/17 (!) 132/94   11/07/17 131/76   10/11/17 (!) 151/96       NPO Status: Time of last liquid consumption: 2345                        Time of last solid consumption: 2345                        Date of last liquid consumption: 11/13/17                        Date of last solid food consumption: 11/13/17    BMI:   Wt Readings from Last 3 Encounters:   11/14/17 209 lb 7 oz (95 kg)   11/08/17 211 lb 10.3 oz (96 kg)   11/07/17 211 lb 6.4 oz (95.9 kg)     Body mass index is 34.32 kg/m².     CBC:   Lab Results   Component Value Date    WBC 7.1 11/08/2017    RBC 4.72 11/08/2017    HGB 13.2 11/08/2017 HCT 41.6 11/08/2017    MCV 88.1 11/08/2017    RDW 14.6 11/08/2017     11/08/2017       CMP:   Lab Results   Component Value Date     11/08/2017    K 4.8 11/08/2017    CL 98 11/08/2017    CO2 25 11/08/2017    BUN 26 11/08/2017    CREATININE 0.90 11/08/2017    GFRAA >60 11/08/2017    LABGLOM >60 11/08/2017    GLUCOSE 86 11/08/2017    PROT 7.7 11/08/2017    CALCIUM 10.2 11/08/2017    BILITOT 0.25 11/08/2017    ALKPHOS 97 11/08/2017    AST 36 11/08/2017    ALT 35 11/08/2017       POC Tests: No results for input(s): POCGLU, POCNA, POCK, POCCL, POCBUN, POCHEMO, POCHCT in the last 72 hours. Coags: No results found for: PROTIME, INR, APTT    HCG (If Applicable): No results found for: PREGTESTUR, PREGSERUM, HCG, HCGQUANT     ABGs: No results found for: PHART, PO2ART, CDP2WGR, IYO5XMB, BEART, E7FOCWIE     Type & Screen (If Applicable):  No results found for: LABABO, [address]    Anesthesia Evaluation  Patient summary reviewed no history of anesthetic complications:   Airway: Mallampati: II  TM distance: >3 FB   Neck ROM: full  Mouth opening: > = 3 FB Dental: normal exam         Pulmonary:Negative Pulmonary ROS and normal exam        (-) not a current smoker          Patient did not smoke on day of surgery. Cardiovascular:    (+) hypertension: no interval change,       ECG reviewed                        Neuro/Psych:   Negative Neuro/Psych ROS              GI/Hepatic/Renal: Neg GI/Hepatic/Renal ROS            Endo/Other: Negative Endo/Other ROS             Pt had PAT visit. Abdominal:           Vascular: negative vascular ROS. Anesthesia Plan      MAC, regional and spinal     ASA 2       Induction: intravenous. MIPS: Postoperative opioids intended. Anesthetic plan and risks discussed with patient. Plan discussed with CRNA.                   Quentin Kilpatrick MD   11/14/2017

## 2017-11-15 LAB
ANION GAP SERPL CALCULATED.3IONS-SCNC: 12 MMOL/L (ref 9–17)
BUN BLDV-MCNC: 26 MG/DL (ref 8–23)
BUN/CREAT BLD: ABNORMAL (ref 9–20)
CALCIUM SERPL-MCNC: 8.8 MG/DL (ref 8.6–10.4)
CHLORIDE BLD-SCNC: 102 MMOL/L (ref 98–107)
CO2: 22 MMOL/L (ref 20–31)
CREAT SERPL-MCNC: 0.91 MG/DL (ref 0.5–0.9)
GFR AFRICAN AMERICAN: >60 ML/MIN
GFR NON-AFRICAN AMERICAN: >60 ML/MIN
GFR SERPL CREATININE-BSD FRML MDRD: ABNORMAL ML/MIN/{1.73_M2}
GFR SERPL CREATININE-BSD FRML MDRD: ABNORMAL ML/MIN/{1.73_M2}
GLUCOSE BLD-MCNC: 125 MG/DL (ref 70–99)
HCT VFR BLD CALC: 32 % (ref 36.3–47.1)
HEMOGLOBIN: 10.4 G/DL (ref 11.9–15.1)
MCH RBC QN AUTO: 28.1 PG (ref 25.2–33.5)
MCHC RBC AUTO-ENTMCNC: 32.5 G/DL (ref 28.4–34.8)
MCV RBC AUTO: 86.5 FL (ref 82.6–102.9)
PDW BLD-RTO: 14.3 % (ref 11.8–14.4)
PLATELET # BLD: 186 K/UL (ref 138–453)
PMV BLD AUTO: 12.1 FL (ref 8.1–13.5)
POTASSIUM SERPL-SCNC: 4.6 MMOL/L (ref 3.7–5.3)
RBC # BLD: 3.7 M/UL (ref 3.95–5.11)
SODIUM BLD-SCNC: 136 MMOL/L (ref 135–144)
WBC # BLD: 14.7 K/UL (ref 3.5–11.3)

## 2017-11-15 PROCEDURE — 6360000002 HC RX W HCPCS: Performed by: STUDENT IN AN ORGANIZED HEALTH CARE EDUCATION/TRAINING PROGRAM

## 2017-11-15 PROCEDURE — 97530 THERAPEUTIC ACTIVITIES: CPT

## 2017-11-15 PROCEDURE — 36415 COLL VENOUS BLD VENIPUNCTURE: CPT

## 2017-11-15 PROCEDURE — 6360000002 HC RX W HCPCS: Performed by: ORTHOPAEDIC SURGERY

## 2017-11-15 PROCEDURE — 6370000000 HC RX 637 (ALT 250 FOR IP): Performed by: STUDENT IN AN ORGANIZED HEALTH CARE EDUCATION/TRAINING PROGRAM

## 2017-11-15 PROCEDURE — 85027 COMPLETE CBC AUTOMATED: CPT

## 2017-11-15 PROCEDURE — 6370000000 HC RX 637 (ALT 250 FOR IP): Performed by: ORTHOPAEDIC SURGERY

## 2017-11-15 PROCEDURE — 2580000003 HC RX 258: Performed by: ORTHOPAEDIC SURGERY

## 2017-11-15 PROCEDURE — 1200000000 HC SEMI PRIVATE

## 2017-11-15 PROCEDURE — 97535 SELF CARE MNGMENT TRAINING: CPT

## 2017-11-15 PROCEDURE — 94762 N-INVAS EAR/PLS OXIMTRY CONT: CPT

## 2017-11-15 PROCEDURE — 97110 THERAPEUTIC EXERCISES: CPT

## 2017-11-15 PROCEDURE — 80048 BASIC METABOLIC PNL TOTAL CA: CPT

## 2017-11-15 RX ORDER — HYDROCODONE BITARTRATE AND ACETAMINOPHEN 5; 325 MG/1; MG/1
1 TABLET ORAL EVERY 6 HOURS PRN
Status: DISCONTINUED | OUTPATIENT
Start: 2017-11-15 | End: 2017-11-16

## 2017-11-15 RX ORDER — DOCUSATE SODIUM 100 MG/1
100 CAPSULE, LIQUID FILLED ORAL 2 TIMES DAILY PRN
Qty: 60 CAPSULE | Refills: 0 | Status: SHIPPED | OUTPATIENT
Start: 2017-11-15 | End: 2017-12-11 | Stop reason: ALTCHOICE

## 2017-11-15 RX ORDER — ASPIRIN 81 MG/1
81 TABLET ORAL 2 TIMES DAILY
Qty: 84 TABLET | Refills: 0 | Status: SHIPPED | OUTPATIENT
Start: 2017-11-15 | End: 2021-08-06

## 2017-11-15 RX ORDER — HYDROCODONE BITARTRATE AND ACETAMINOPHEN 5; 325 MG/1; MG/1
1 TABLET ORAL EVERY 4 HOURS PRN
Qty: 42 TABLET | Refills: 0 | Status: SHIPPED | OUTPATIENT
Start: 2017-11-15 | End: 2017-11-16 | Stop reason: HOSPADM

## 2017-11-15 RX ADMIN — Medication 10 ML: at 20:13

## 2017-11-15 RX ADMIN — HYDROCODONE BITARTRATE AND ACETAMINOPHEN 1 TABLET: 5; 325 TABLET ORAL at 07:13

## 2017-11-15 RX ADMIN — HYDROCODONE BITARTRATE AND ACETAMINOPHEN 1 TABLET: 5; 325 TABLET ORAL at 18:50

## 2017-11-15 RX ADMIN — Medication 10 ML: at 08:11

## 2017-11-15 RX ADMIN — DOCUSATE SODIUM 100 MG: 100 CAPSULE ORAL at 08:10

## 2017-11-15 RX ADMIN — Medication 2 G: at 00:32

## 2017-11-15 RX ADMIN — HYDROCODONE BITARTRATE AND ACETAMINOPHEN 1 TABLET: 5; 325 TABLET ORAL at 12:55

## 2017-11-15 RX ADMIN — KETOROLAC TROMETHAMINE 30 MG: 30 INJECTION, SOLUTION INTRAMUSCULAR; INTRAVENOUS at 02:31

## 2017-11-15 RX ADMIN — DOCUSATE SODIUM 100 MG: 100 CAPSULE ORAL at 20:13

## 2017-11-15 RX ADMIN — LISINOPRIL 20 MG: 20 TABLET ORAL at 08:10

## 2017-11-15 RX ADMIN — HYDROCHLOROTHIAZIDE 12.5 MG: 25 TABLET ORAL at 08:10

## 2017-11-15 RX ADMIN — ASPIRIN 81 MG: 81 TABLET, COATED ORAL at 08:11

## 2017-11-15 RX ADMIN — ASPIRIN 81 MG: 81 TABLET, COATED ORAL at 20:13

## 2017-11-15 ASSESSMENT — PAIN DESCRIPTION - FREQUENCY: FREQUENCY: CONTINUOUS

## 2017-11-15 ASSESSMENT — PAIN SCALES - GENERAL
PAINLEVEL_OUTOF10: 3
PAINLEVEL_OUTOF10: 4
PAINLEVEL_OUTOF10: 0
PAINLEVEL_OUTOF10: 5
PAINLEVEL_OUTOF10: 5
PAINLEVEL_OUTOF10: 4
PAINLEVEL_OUTOF10: 4
PAINLEVEL_OUTOF10: 0
PAINLEVEL_OUTOF10: 5
PAINLEVEL_OUTOF10: 2

## 2017-11-15 ASSESSMENT — PAIN DESCRIPTION - ORIENTATION
ORIENTATION: LEFT
ORIENTATION: LEFT

## 2017-11-15 ASSESSMENT — PAIN DESCRIPTION - DESCRIPTORS: DESCRIPTORS: ACHING;CONSTANT

## 2017-11-15 ASSESSMENT — PAIN DESCRIPTION - PAIN TYPE
TYPE: SURGICAL PAIN
TYPE: SURGICAL PAIN

## 2017-11-15 ASSESSMENT — PAIN DESCRIPTION - LOCATION
LOCATION: HIP
LOCATION: HIP

## 2017-11-15 NOTE — PROGRESS NOTES
Patient instructed on how to use incentive spirometer. Patient stated she has already been using it several times during the day and demonstrated correct technique. Writer will continue to encourage use.

## 2017-11-15 NOTE — PROGRESS NOTES
Physical Therapy  Facility/Department: [address] ORTHO/MED SURG  Daily Treatment Note  NAME: Estrellita Umana  : 1950  MRN: [id number]    Date of Service: 11/15/2017    Patient Diagnosis(es):   Patient Active Problem List    Diagnosis Date Noted    Osteopenia 2017    Osteoarthritis of left hip 2017    Essential hypertension 02/10/2017    Hip tendonitis 02/10/2017       Past Medical History:   Diagnosis Date    Arthritis     osteoarthritis    Hypertension      Past Surgical History:   Procedure Laterality Date    CHOLECYSTECTOMY, LAPAROSCOPIC      DILATION AND CURETTAGE OF UTERUS      HIP ARTHROPLASTY Left     STRABISMUS SURGERY Bilateral     as child (lazy eye?? )    TUBAL LIGATION         Restrictions  Restrictions/Precautions  Restrictions/Precautions: Weight Bearing, Fall Risk, General Precautions  Required Braces or Orthoses?: No  Lower Extremity Weight Bearing Restrictions  Left Lower Extremity Weight Bearing: Weight Bearing As Tolerated  Position Activity Restriction  Hip Precautions:  (Anterior approach: no straight leg raises)  Other position/activity restrictions: L KARLA on  - anterior approach, no SLR  Subjective   General  Chart Reviewed: Yes  Family / Caregiver Present: No  Pain Screening  Patient Currently in Pain: Yes (Patient reports that she is sore from this morning's treatment session)  Pain Assessment  Pain Assessment: 0-10  Pain Level: 5  Vital Signs  Patient Currently in Pain: Yes (Patient reports that she is sore from this morning's treatment session)       Orientation  Orientation  Overall Orientation Status: Within Functional Limits  Objective   Bed mobility  Supine to Sit: Stand by assistance  Sit to Supine: Minimal assistance (Karo to move LLE due to soreness)  Scooting: Stand by assistance  Transfers  Sit to Stand: Stand by assistance  Stand to sit: Stand by assistance  Ambulation  Ambulation?: Yes  More Ambulation?: Yes  Ambulation 1  Surface: Functional Mobility Training, Balance Training, Endurance Training, Gait Training, Stair training  Safety Devices  Type of devices: Bed alarm in place, Call light within reach, Nurse notified, Gait belt, Left in bed (Left in room with RN)  Restraints  Initially in place: No     Therapy Time   Individual Concurrent Group Co-treatment   Time In 1310         Time Out 1346         Minutes 39                 TARYN Bowen  Evaluation/treatment performed by Student PT under the supervision of co-signing PT who agrees with all evaluation/treatment and documentation.

## 2017-11-15 NOTE — OP NOTE
anesthesia and MAC sedation during the  procedure. The patient was placed supine on the operating table, and the  left lower extremity was held with a Medacta table extender. The left  lower extremity was prepped and draped in sterile fashion. A time-out was  performed. Antibiotics were given prior to incision. Tranexamic acid was  given prior to incision and then again at wound closure. At this time, a  10-cm incision 2 fingerbreadths lateral and 2 fingerbreadths distal to the  ASIS was created that was longitudinal in nature, that headed towards the  lateral aspect of the patella. Incision was made down through the skin and  subcutaneous tissue until the fascia of the tensor fascia moses muscle was  identified. A longitudinal split was made to the fascia in line with its  fibers at this point. An Allis clamp was used to elevate the anterior  fascia, and blunt dissection with a hand was used to expose the medial side  of the muscle. Retractors were placed, and the vessels in this level were  identified and cauterized. Dissection was carried down deeper in this  interval until the rectus femoris was identified as well as the hip  capsule. A release of the rectus femoris was performed, and once the  capsule was identified, an anterior capsulectomy was performed. As we  gained better and better exposure at this point after the capsulectomy,  retractors were placed intracapsular on the femoral neck. At this point,  an AP x-ray was used to help guide for the femoral neck cut. After the  femoral neck cut was confirmed on radiographic images, the cut was  performed. A second cut for a napkin osteotomy was performed followed by  removal of the femoral head. Once the femoral head was removed, it was  measured to be a 48-mm head. At this point, a Bovie was then used to  complete releases and excision of the residual labrum around the acetabulum  as well as the pulvinar.   Once the exposure was satisfactory, sequential  reaming beginning at 47-mm reamer was performed in the acetabulum, taking  care to use fluoroscopic guidance for the correct placement of the reamers  in the acetabulum. Sequential reaming was made up to 51 mm. At this  point, it was found that the acetabulum had been appropriately reamed, and  a 52-mm acetabular cup from [address] [place] [address] was selected. This was impacted into  satisfactory position, which was confirmed on x-ray guidance. After the cup had been implanted, attention was then turned to the proximal  femur. At this point, release of the pubofemoral ligaments in the residual  capsule along the proximal portion of the femur was performed until the  lesser trochanter was exposed. A release of the posterior capsule was also  performed at this point. Once these releases had been performed, the leg  was then dropped into appropriate position to expose the femoral canal for  broaching. At this point, a femoral neck cookie cutter was used to open  the canal followed by a rat tail type broach to help localize the canal.   After these were used, sequential broaching using a 0 broach, all the way  up to a #4 size broach was used. Once the number size 4 had been reached,  this found to be a satisfactory fit. During swapping out the second and  third broaches, there was a small fracture that was created with the  retractor on the medial aspect of the proximal femur. This was a small  portion of the bone that had been broken and was removed. It was felt at  this point, after sequential broaching performed after this had occurred,  that the broaches were stable and no stability had been compromised due to  this. Once the size #4 broach was placed, trial with the size 0  dual-mobility head as well as a negative 3.5-mm head was performed, and a  negative 3.5-mm head was found to fit more appropriately.     At this point, the broach was removed, and the final implant of Medacta  size 4 stem was inserted followed by the dual-mobility 28-mm head, and a  52-mm dual-mobility liner was inserted and impacted using the Her[place] taper. The final reduction of the hip was performed, and after this, irrigation  with solution of Betadine diluted with saline was used for a total of 3  minutes followed by 1 L irrigation of sterile saline. At this point, final  AP and lateral images were obtained of the left hip as well as  magnification views of the tip of the stem to verify if any fracture had  propagated, which no fractures were identified on these films. Wound  closure was begun with a #2 Vicryl to close the tensor fascia, followed by  0 Stratafix, followed by 2-0 Stratafix, followed by Dermabond for the skin. After this, wound was closed. A sterile dressing was applied, and the  patient was awakened and transported to the PACU in stable condition. The  second dose of tranexamic acid was given at wound closure. Dr. Wes Florez was present and actively participated throughout the key  portions of the surgery.         Lori Springer    D: 11/14/2017 10:00:40       T: 11/14/2017 14:08:04     [email or web address]  Job#: [id number]     Doc#: [id number]

## 2017-11-15 NOTE — PROGRESS NOTES
Physical Therapy  Facility/Department: [address] ORTHO/MED SURG  Daily Treatment Note  NAME: Lorenza Dacosta  : 1950  MRN: [id number]    Date of Service: 11/15/2017    Patient Diagnosis(es):   Patient Active Problem List    Diagnosis Date Noted    Osteopenia 2017    Osteoarthritis of left hip 2017    Essential hypertension 02/10/2017    Hip tendonitis 02/10/2017       Past Medical History:   Diagnosis Date    Arthritis     osteoarthritis    Hypertension      Past Surgical History:   Procedure Laterality Date    CHOLECYSTECTOMY, LAPAROSCOPIC      DILATION AND CURETTAGE OF UTERUS      HIP ARTHROPLASTY Left     STRABISMUS SURGERY Bilateral     as child (lazy eye?? )    TUBAL LIGATION         Restrictions  Restrictions/Precautions  Restrictions/Precautions: Weight Bearing, Fall Risk, General Precautions  Required Braces or Orthoses?: No  Lower Extremity Weight Bearing Restrictions  Left Lower Extremity Weight Bearing: Weight Bearing As Tolerated  Position Activity Restriction  Hip Precautions:  (Anterior approach: no straight leg raises)  Other position/activity restrictions: L KARLA on  - anterior approach, no SLR     Subjective  Patient denies pain at rest, however reports 2/10 pain w/ movement    Orientation  Orientation  Overall Orientation Status: Within Functional Limits  Objective   Bed mobility  Supine to Sit: Stand by assistance  Sit to Supine: Stand by assistance  Scooting: Stand by assistance  Transfers  Sit to Stand: Contact guard assistance  Stand to sit: Contact guard assistance  Ambulation  Ambulation?: Yes  More Ambulation?: Yes  Ambulation 1  Surface: level tile  Device: Rolling Walker  Assistance: Contact guard assistance  Quality of Gait: decreased step and stride on the L, antalgic  Distance: 150ft   Ambulation 2  Surface - 2: level tile  Device 2: Rolling Walker  Assistance 2: Contact guard assistance  Quality of Gait 2: decreased step and stride on the L, antalgic  Distance: 150ft  Stairs/Curb  Stairs?: Yes  Stairs  # Steps : 5  Stairs Height: 4\"  Rails: Bilateral  Assistance: Contact guard assistance     Balance  Posture: Good  Sitting - Static: Good  Sitting - Dynamic: Good  Standing - Static: Good (RW)  Standing - Dynamic: Good;- (RW)    Therapeutic Exercise  Supine Exercises: Ankle Pumps, Gluteal sets, Hamstring Sets, Heel Slides, Hip ABD/ADD, Hip IR/ER, Quad Sets, SAQ Reps: 15x LLE    Assessment   Body structures, Functions, Activity limitations: Decreased functional mobility ; Decreased sensation  Assessment: Patient tolerated treatment well. Patient was SBA for bed mobility activities, CGA for functional transfers, and ambulated [id number] RW w/ antalgic gait on the L.  REQUIRES PT FOLLOW UP: Yes  Activity Tolerance  Activity Tolerance: Patient Tolerated treatment well       Discharge Recommendations:  Home with assist PRN    Goals  Short term goals  Time Frame for Short term goals: 14 visits  Short term goal 1: Pt will be I with bed mobility  Short term goal 2: Pt will be I with transfers  Short term goal 3: Pt will be I with amb 300' without AD  Short term goal 4: Pt will navigate 4 steps Stefani with unilateral rail use    Plan    Plan  Times per week: 7x/wk BID  Current Treatment Recommendations: Strengthening, Transfer Training, Functional Mobility Training, Balance Training, Endurance Training, Gait Training, Stair training  Safety Devices  Type of devices: Gait belt  Restraints  Initially in place: No     Therapy Time   Individual Concurrent Group Co-treatment   Time In 0953         Time Out 1030         Minutes 40                 Terry Zimmerman, SPT  Evaluation/treatment performed by Student PT under the supervision of co-signing PT who agrees with all evaluation/treatment and documentation.

## 2017-11-15 NOTE — CARE COORDINATION
[facility] Ortho Bundle Transition Interview    Patient Interviewed: 11/15/17  Informed patient of [facility] Ortho Bundle Program and role of CTS/CTC. CMS Letter Given:  Yes - 11/15/17    Met with patient - sitting up in chair - tolerating PT - planning for outpatient PT @ [facility] upon discharge. Explained BUNDLE program & call protocol. Verbalizes understanding & acceptance of program.  Care Transitions contact information given to patient. Living Situation:   Living arrangements: with family:  spouse who is awaiting a liver transplant @ [address]: 1-story house/ trailer - one story - few steps   Social support:a good social support network    Risk Analysis: DAI 8.25   Chronic Illness: HTN, osteoarthritis    Fall Risk & DME:   Any previous falls or injuries in the home? no               Visual Impairment: reading glasses               Difficulty hearing: no                Home Equipment: walker, elevated TS, bars in shower    Financial Assessment   Afford medications? yes   Connected with the following services? no               Mode of transportation? car    Health Literacy Assessment  Does anyone help with medications at home? no  Anything preventing from taking medications at home? no     Anticipated Needs Post Evaluation: denies need for home care - plans to have OP PT - starting 11/17/17    Follow up appointments:  11/17 PT, 11/20 PT, 11/27- ortho  Future Appointments  Date Time Provider Jeovanny Olivo   11/17/2017 11:00 AM Brittni Newby, PT [facility]   11/20/2017 2:00 PM Cassy Matthew, PT [facility]   11/27/2017 7:30 AM [facility]   12/11/2017 8:30 AM Jamar Wadsworth CNP [facility]

## 2017-11-15 NOTE — PROGRESS NOTES
Progress Note    Patient:  Caroline March  YOB: 1950     74 y.o. female    Subjective:  Patient seen and examined at bedside this morning. Patient is very excited about her new hip stating \"why did I wait this long to get this done. \"  Patient questions a switch from Roxicodone to [address] [place] [address],[address] since she was taking Norco at home prior to surgery for pain. No complaints or concerns per patient this morning. No acute issues overnight per nursing. Pain well-controlled on current regimen  Denies: fever/chills, HA, CP, SOB, N/V, dysuria, or numbness/tingling in extremities. .   PT: Ambulated 10 feet with physical therapy. To reassess today    Vitals reviewed: mildly tachycardic, otherwise normal and afebrile. Objective:   Vitals:    11/15/17 0415   BP: (!) 110/59   Pulse: 109   Resp: 16   Temp: 98.3 °F (36.8 °C)   SpO2: 97%     Gen: NAD, cooperative  MSK: Left hip with aquacel in place. No surrounding erythema, some mild ecchymosis along the medial aspect of the dressing. Minimal TTP. Compartments soft. 2+ DP pulse. TA/EHL/FHL/GS motor intact. Deep and Superficial Peroneal/Saphenous/Sural/Plantar SILT. No results for input(s): WBC, HGB, HCT, PLT, INR, PTT, NA, K, BUN, CREATININE, GLUCOSE in the last 72 hours. Invalid input(s): PT,  ESR,  CRP    Meds: Aspiring 81 mg BID  See rec for complete list    A/P: 74 y.o. female s/p L KARLA, POD#1    -Up with PT today  -Weight Bearing: WBAT  -Post operative Ancef completed  -Pain control: Norco 5/325 mg. IV toradol PRN. -Tolerating PO intake well  -Ice (20 min, 1 hour off) and elevation for edema/pain control  -Encourage IS and deep breathing  -DVT ppx: ASA 81 mg BID and EPC  -PT: continue to evaluate  -Please page DO ortho with any questions    Megan Thorpe, DO  PGY-1 Orthopedic Surgery  4:26 AM 11/15/2017      PGY 2 Addendum  Pt seen and examined. Agree with above. Pain controlled. Afebrile.  Denies nausea, vomiting, CP, SOB, fever, chills, numbness/tingling. Tolerating diet well. Dressing in place cdi. Exam as above      A/P: 74 y.o. female s/p L KARLA pod1    - WBAT  - Leave aquacel on until follow up  - PT/OT today  - DVT ppx asa 81mg bid  - Ok to be discharged home today  - Follow up with Dr. Satya Dillon in 7-10 days, call [phone] to schedule an appointment.     Kathey Boeck, DO  5:28 AM 11/15/2017

## 2017-11-15 NOTE — PROGRESS NOTES
Occupational Therapy  Facility/Department: [address] ORTHO/MED SURG  Daily Treatment Note  NAME: Ajay Ruiz  : 1950  MRN: [id number]    Date of Service: 11/15/2017    Patient Diagnosis(es): The encounter diagnosis was Status post total replacement of left hip.      has a past medical history of Arthritis and Hypertension. has a past surgical history that includes Cholecystectomy, laparoscopic (); Tubal ligation; Dilation and curettage of uterus (); Strabismus surgery (Bilateral); and Hip Arthroplasty (Left). Restrictions  Restrictions/Precautions  Restrictions/Precautions: Weight Bearing, Fall Risk, General Precautions  Required Braces or Orthoses?: No  Lower Extremity Weight Bearing Restrictions  Left Lower Extremity Weight Bearing: Weight Bearing As Tolerated  Position Activity Restriction  Hip Precautions:  (Anterior approach: no straight leg raises)  Other position/activity restrictions: L KARLA on  - anterior approach, no SLR  Subjective   General  Chart Reviewed: No  Patient assessed for rehabilitation services?: Yes  Family / Caregiver Present: No  General Comment  Comments: RN ok'd for therapy this PM. Pt agreeable to participate in session and pleasant/cooperative throughout. Pain Assessment  Patient Currently in Pain: Denies  Vital Signs  Patient Currently in Pain: Denies   Orientation  Orientation  Overall Orientation Status: Within Functional Limits  Objective    ADL  Feeding: Independent  Grooming: Setup  UE Bathing: Setup (standing at sink.)  LE Bathing: Stand by assistance (standing at sink.)  UE Dressing: Contact guard assistance (to manage gown.)  LE Dressing: Minimal assistance (to doff/albert socks.)  Balance  Sitting Balance: Independent  Standing Balance: Stand by assistance  Standing Balance  Time: 10 minutes  Activity: functional mobility to/from bathroom, ADL care standing at sink.   Sit to stand: Stand by assistance  Stand to sit: Stand by assistance  Functional

## 2017-11-15 NOTE — PLAN OF CARE
Problem: Discharge Planning:  Goal: Knowledge of discharge instructions  Knowledge of discharge instructions   Outcome: Ongoing      Problem: Infection - Surgical Site:  Goal: Signs of wound healing will improve  Signs of wound healing will improve   Outcome: Ongoing      Problem: Pain - Acute:  Goal: Pain level will decrease  Pain level will decrease    Outcome: Ongoing      Problem: Falls - Risk of  Goal: Absence of falls  Outcome: Met This Shift      Problem: Risk for Impaired Skin Integrity  Goal: Tissue integrity - skin and mucous membranes  Structural intactness and normal physiological function of skin and  mucous membranes.    Outcome: Ongoing      Problem: Pain:  Goal: Control of acute pain  Control of acute pain   Outcome: Ongoing    Goal: Control of chronic pain  Control of chronic pain   Outcome: Ongoing    Goal: Pain level will decrease  Pain level will decrease    Outcome: Ongoing

## 2017-11-15 NOTE — FLOWSHEET NOTE
11/14/17 2040   Encounter Summary   Services provided to: Patient and family together   Referral/Consult From: [facility] Spouse   Continue Visiting (11/14/2017)   Complexity of Encounter Low   Length of Encounter 30 minutes   Spiritual Assessment Completed Yes   Routine   Type Initial   Assessment Calm; Approachable; Hopeful;Coping;Peaceful   Intervention Active listening;Explored feelings, thoughts, concerns;Prayer;Sustaining presence/ Ministry of presence   Outcome Acceptance;Comfort;Expressed gratitude;Engaged in conversation; Shared life review;Encouraged; Hopeful;Receptive     Facts: This patient was visited with her spouse in her room. She had just had her hip replaced, and she was recovering. Feelings: Despite the surgery, this patient was very open to a visit and was very talkative as was her spouse. She expressed the pain that she was suffering as she was recovering. Her  was also very open, and he discussed his Addison [address] in [facility].    Family:  The patient expressed the fact that she had grown up nearby, and that she and her  had only lived about five blocks apart during their years as children. Jade: This patient is [employer].     Follow-Up: Chaplains will remain available to return to visit this patient upon request.

## 2017-11-16 VITALS
HEART RATE: 97 BPM | DIASTOLIC BLOOD PRESSURE: 84 MMHG | SYSTOLIC BLOOD PRESSURE: 134 MMHG | HEIGHT: 66 IN | RESPIRATION RATE: 18 BRPM | WEIGHT: 209.44 LBS | TEMPERATURE: 98.3 F | OXYGEN SATURATION: 96 % | BODY MASS INDEX: 33.66 KG/M2

## 2017-11-16 LAB
HCT VFR BLD CALC: 31.2 % (ref 36.3–47.1)
HEMOGLOBIN: 9.8 G/DL (ref 11.9–15.1)
MCH RBC QN AUTO: 27.5 PG (ref 25.2–33.5)
MCHC RBC AUTO-ENTMCNC: 31.4 G/DL (ref 28.4–34.8)
MCV RBC AUTO: 87.6 FL (ref 82.6–102.9)
PDW BLD-RTO: 14.7 % (ref 11.8–14.4)
PLATELET # BLD: 178 K/UL (ref 138–453)
PMV BLD AUTO: 11.7 FL (ref 8.1–13.5)
RBC # BLD: 3.56 M/UL (ref 3.95–5.11)
WBC # BLD: 10.1 K/UL (ref 3.5–11.3)

## 2017-11-16 PROCEDURE — 6370000000 HC RX 637 (ALT 250 FOR IP): Performed by: ORTHOPAEDIC SURGERY

## 2017-11-16 PROCEDURE — 6370000000 HC RX 637 (ALT 250 FOR IP): Performed by: STUDENT IN AN ORGANIZED HEALTH CARE EDUCATION/TRAINING PROGRAM

## 2017-11-16 PROCEDURE — 2580000003 HC RX 258: Performed by: ORTHOPAEDIC SURGERY

## 2017-11-16 PROCEDURE — 97110 THERAPEUTIC EXERCISES: CPT

## 2017-11-16 PROCEDURE — 97530 THERAPEUTIC ACTIVITIES: CPT

## 2017-11-16 PROCEDURE — 36415 COLL VENOUS BLD VENIPUNCTURE: CPT

## 2017-11-16 PROCEDURE — 85027 COMPLETE CBC AUTOMATED: CPT

## 2017-11-16 PROCEDURE — 97535 SELF CARE MNGMENT TRAINING: CPT

## 2017-11-16 RX ORDER — OXYCODONE HYDROCHLORIDE AND ACETAMINOPHEN 5; 325 MG/1; MG/1
1 TABLET ORAL EVERY 4 HOURS PRN
Status: DISCONTINUED | OUTPATIENT
Start: 2017-11-16 | End: 2017-11-16 | Stop reason: HOSPADM

## 2017-11-16 RX ORDER — OXYCODONE HYDROCHLORIDE AND ACETAMINOPHEN 5; 325 MG/1; MG/1
1 TABLET ORAL EVERY 4 HOURS PRN
Qty: 42 TABLET | Refills: 0 | Status: SHIPPED | OUTPATIENT
Start: 2017-11-16 | End: 2017-11-23

## 2017-11-16 RX ADMIN — ASPIRIN 81 MG: 81 TABLET, COATED ORAL at 07:45

## 2017-11-16 RX ADMIN — HYDROCHLOROTHIAZIDE 12.5 MG: 25 TABLET ORAL at 07:47

## 2017-11-16 RX ADMIN — DOCUSATE SODIUM 100 MG: 100 CAPSULE ORAL at 07:45

## 2017-11-16 RX ADMIN — HYDROCODONE BITARTRATE AND ACETAMINOPHEN 1 TABLET: 5; 325 TABLET ORAL at 01:11

## 2017-11-16 RX ADMIN — Medication 10 ML: at 07:46

## 2017-11-16 RX ADMIN — OXYCODONE HYDROCHLORIDE AND ACETAMINOPHEN 1 TABLET: 5; 325 TABLET ORAL at 07:46

## 2017-11-16 RX ADMIN — LISINOPRIL 20 MG: 20 TABLET ORAL at 07:46

## 2017-11-16 ASSESSMENT — PAIN DESCRIPTION - ONSET: ONSET: ON-GOING

## 2017-11-16 ASSESSMENT — PAIN DESCRIPTION - ORIENTATION
ORIENTATION: LEFT

## 2017-11-16 ASSESSMENT — PAIN SCALES - GENERAL
PAINLEVEL_OUTOF10: 6
PAINLEVEL_OUTOF10: 2
PAINLEVEL_OUTOF10: 4
PAINLEVEL_OUTOF10: 3
PAINLEVEL_OUTOF10: 4
PAINLEVEL_OUTOF10: 5
PAINLEVEL_OUTOF10: 4

## 2017-11-16 ASSESSMENT — PAIN DESCRIPTION - PROGRESSION: CLINICAL_PROGRESSION: GRADUALLY IMPROVING

## 2017-11-16 ASSESSMENT — PAIN DESCRIPTION - LOCATION
LOCATION: HIP

## 2017-11-16 ASSESSMENT — PAIN DESCRIPTION - PAIN TYPE
TYPE: SURGICAL PAIN

## 2017-11-16 ASSESSMENT — PAIN DESCRIPTION - DESCRIPTORS: DESCRIPTORS: ACHING;CONSTANT

## 2017-11-16 ASSESSMENT — PAIN DESCRIPTION - FREQUENCY
FREQUENCY: CONTINUOUS
FREQUENCY: CONTINUOUS

## 2017-11-16 NOTE — PROGRESS NOTES
Progress Note    Patient:  Tracie Bernstein  YOB: 1950     74 y.o. female    Subjective:  Patient seen and examined at bedside this morning. Patient complaining of increased pain this morning, now requesting percocet as the norco \"isn't quite cutting it\". Changed to percocet this morning. No acute issues overnight per nursing and patient otherwise doing well this morning. Denies: fever/chills, HA, CP, SOB, N/V, dysuria, or numbness/tingling in extremities. PT: Ambulated multiple times with physical therapy yesterday, 300 ft and 100 ft. Vitals reviewed: afebrile    Objective:   Vitals:    11/16/17 0124   BP: (!) 150/82   Pulse: 89   Resp: 16   Temp: 98.5 °F (36.9 °C)   SpO2: 97%     Gen: NAD, cooperative  LLE: Left hip with aquacel in place. No surrounding erythema and no strikethrough, Minimal TTP. Compartments soft. 2+ DP pulse. TA/EHL/FHL/GS motor intact 5/5. Deep and Superficial Peroneal/Saphenous/Sural/Plantar sensation intact grossly. Recent Labs      11/15/17   0437   WBC  14.7*   HGB  10.4*   HCT  32.0*   PLT  186   NA  136   K  4.6   BUN  26*   CREATININE  0.91*   GLUCOSE  125*       Meds: Aspiring 81 mg BID  See rec for complete list    A/P: 74 y.o. female s/p L KARLA, POD#2    -Up with PT today  -Weight Bearing: WBAT  -Post operative Ancef completed  -Pain control: Percocet 5/325 mg. IV toradol PRN. -Tolerating PO intake well  -Ice (20 min, 1 hour off) and elevation for edema/pain control  -Encourage IS and deep breathing  -DVT ppx: ASA 81 mg BID and EPC  -PT: continue to evaluate  -Plan for DC home after PT today  -Please page DO ortho with any questions    Princess Jain, DO  PGY-1 Orthopedic Surgery  5:01 AM 11/16/2017            PGY 3 Addendum:  Pt seen and examined. Agree with above. Patient doing very well. Pain controlled better with Percocet. [address] [place] [address],[address] was filled with meds to beds program yesterday.  Discussed with nurse, will return the norco pills and fill the percocet. ASA for 6 weeks for DVT ppx. F/u Dr. Ethel Greenberg as scheduled.       Patrick Kimball DO  5:39 AM 11/16/2017

## 2017-11-16 NOTE — PROGRESS NOTES
Normal Limits  Objective   Bed mobility  Supine to Sit: Stand by assistance  Sit to Supine: Stand by assistance  Transfers  Sit to Stand: Stand by assistance  Stand to sit: Stand by assistance  Ambulation  Ambulation?: Yes  Ambulation 1  Surface: level tile  Device: Rolling Walker  Assistance: Stand by assistance  Quality of Gait: Decreased step and stride length on L, apporpriate maciel  Distance: [id number]     Balance  Posture: Good  Sitting - Static: Good  Sitting - Dynamic: Good  Standing - Static: Good;-  Standing - Dynamic: Fair;+          Therapeutic Exercises: Supine Exercises: Ankle Pumps, Gluteal sets, Hamstring Sets, Heel Slides, Hip ABD/ADD, Hip IR/ER, Quad Sets, SAQ Reps: 10x AROM                 Assessment   Body structures, Functions, Activity limitations: Decreased functional mobility ; Decreased sensation  Assessment: Pt ambulated 150ft with RW and CGA, only CGA required for bed mobility as well. Pt safe to return home with family support.    Prognosis: Good  REQUIRES PT FOLLOW UP: Yes  Activity Tolerance  Activity Tolerance: Patient Tolerated treatment well       Discharge Recommendations:  Home with assist PRN                                               Goals  Short term goals  Time Frame for Short term goals: 14 visits  Short term goal 1: Pt will be I with bed mobility  Short term goal 2: Pt will be I with transfers  Short term goal 3: Pt will be I with amb 300' without AD  Short term goal 4: Pt will navigate 4 steps Stefani with unilateral rail use    Plan    Plan  Times per week: 7x/wk BID  Current Treatment Recommendations: Strengthening, Transfer Training, Functional Mobility Training, Balance Training, Endurance Training, Gait Training, Stair training  Safety Devices  Type of devices: Bed alarm in place, Call light within reach, Nurse notified, Gait belt, Left in bed (Left in room with RN)  Restraints  Initially in place: No     Therapy Time   Individual Concurrent Group Co-treatment   Time In 4614         Time Out 0905         Minutes 9 [place], [place]

## 2017-11-16 NOTE — PROGRESS NOTES
Technique: Ambulating  Equipment Used: Raised toilet seat with rails  Toilet Transfer: Stand by assistance  Bed mobility  Supine to Sit: Stand by assistance  Sit to Supine: Stand by assistance  Scooting: Stand by assistance  Transfers  Sit to stand: Stand by assistance  Stand to sit: Stand by assistance  Transfer Comments: Use of RW throughout with pt demonstrating proper hand/foot placement as well as good safety throughout. Cognition  Overall Cognitive Status: WFL    Assessment   Performance deficits / Impairments: Decreased functional mobility ; Decreased ADL status; Decreased high-level IADLs  Prognosis: Good  Patient Education: OT POC, anterior hip precautions with handout provided, safe transfers with use of RW, home safety with pt verbalizing understanding  Discharge Recommendations: Home with assist PRN  REQUIRES OT FOLLOW UP: Yes  Activity Tolerance  Activity Tolerance: Patient Tolerated treatment well  Safety Devices  Safety Devices in place: Yes  Type of devices: Call light within reach; Patient at risk for falls;Nurse notified; Left in bed;Bed alarm in place       Discharge Recommendations:  Home with assist PRN     Plan   Plan  Times per week: 6-7x/wk    Goals  Short term goals  Time Frame for Short term goals: Pt will, by discharge:  Short term goal 1: perform functional transfers/functional mobility with mod IND using RW  Short term goal 2: independently demo safety awareness with use of RW during functional transfers/functional mobility  Short term goal 3: perform ADL tasks with setup  Short term goal 4: demo 15+ minutes standing tolerance for increased participation in ADLs    Therapy Time   Individual Concurrent Group Co-treatment   Time In 0757         Time Out 0820         Minutes 23            Upon entering room, pt supine in bed with HOB raised. Pt required above assist for bed mob to sit EOB in prep for sit>stand transfer and func mob with standard walker.  Pt demonstrated good placement of

## 2017-11-16 NOTE — PLAN OF CARE
Problem: Falls - Risk of  Goal: Absence of falls  Outcome: Ongoing  No falls , up with assist as needed, gait steady, call light in reach, bed alarm on  For safety. Problem: Pain:  Goal: Control of acute pain  Control of acute pain   Outcome: Ongoing  Pt able to call out for pain meds as needed.

## 2017-11-17 ENCOUNTER — HOSPITAL ENCOUNTER (OUTPATIENT)
Dept: PHYSICAL THERAPY | Age: 67
Setting detail: THERAPIES SERIES
Discharge: HOME | End: 2017-11-17
Payer: MEDICARE

## 2017-11-17 ENCOUNTER — CARE COORDINATION (OUTPATIENT)
Dept: CASE MANAGEMENT | Age: 67
End: 2017-11-17

## 2017-11-17 PROCEDURE — 97110 THERAPEUTIC EXERCISES: CPT

## 2017-11-17 PROCEDURE — G8978 MOBILITY CURRENT STATUS: HCPCS

## 2017-11-17 PROCEDURE — G8979 MOBILITY GOAL STATUS: HCPCS

## 2017-11-17 PROCEDURE — 97161 PT EVAL LOW COMPLEX 20 MIN: CPT

## 2017-11-17 NOTE — CARE COORDINATION
Encounter with patient for post surgical follow up. She had PT scheduled for today. First night at home fine. Pain managed with medications, taking anticoagulant. Didn't shower but was able to bath and dress with spouse assistance. Doing walking at home, elevating and she slept great. Pleased with progress.

## 2017-11-17 NOTE — CONSULTS
ROM  ° A/P STRENGTH TESTS (+/-) Left Right Not Tested    Left Right Left Right Ant. Drawer   []   Hip Flex 90  2/5  Post. Drawer   []   Ext   4-/5  Lachmans   []   ER   4-/5  Valgus Stress   []   IR   4-/5  Varus Stress   []   ABD   4-/5  Jamals   []   ADD   4-/5  Apleys Comp.   []   Knee Flex     Apleys Dist.   []   Ext     Hip Scouring   []   Ankle DF     DEONNAs   []   PF     Piriformis   []   INV     Lys   []   EVER     Talor Tilt   []        Pat-Fem Grind   []       OBSERVATION No Deficit Deficit Not Tested Comments   Posture       Scoliosis [x] [] []    Iliac Crest [x] [] []    PSIS [x] [] []    ASIS [x] [] []    Genu Valgus [x] [] []    Genu Varus [x] [] []    Genu Recurvatum [x] [] []    Pronation [x] [] []    Supination [x] [] []    Leg Length Discrp [x] [] []    Slumped Sitting [x] [] []    Palpation [x] [] []    Sensation [x] [] []    Edema [] [x] [] mild   Neurological [x] [] []    Patellar Mobility [x] [] []    Patellar Orientation [x] [] []    Gait [] [x] [] Analysis: Left leg WBAT with RW, antalgic shortened stride. FUNCTION Normal Difficult Unable   Sitting [x] [] []   Standing [] [x] []   Ambulation [] [x] []   Groom/Dress [x] [] []   Lift/Carry [x] [] []   Stairs [] [] [x]   Bending [] [x] []   Squat [] [x] []   Kneel [] [x] []     BALANCE/PROPRIOCEPTION              [] Not tested   Single leg stance       R                     L                                PAIN   Eyes open                             Sec. Sec                  . []    Eyes closed                          Sec. Sec                  . []        FUNCTIONAL TESTS PAIN NO PAIN COMMENTS   Step Test 4 [] []    6 [] []    8 [] []    Squat [x] [] mild       Comments:  Assessment:  Problems:    [x] ? Pain:  4/10 left hip    [x] ? ROM: decreased flexion and abduction, due to pain     [x] ? Strength: 2/5 hip flexion, 4-/5 all other planes. [x] ? Function: LEFS score 87% impaired   [x] ? Balance   [x] Edema:  [] Postural Deviations   [] Gait Deviations  [x] Other: TU sec      STG: (to be met in 8 treatments)  1. ? Pain: 2/10 left hip pain with ambulation. 2. ? ROM: hip flexion to 100 degrees and abduction to 40 degrees to improve hip stiffness. 3. ? Strength: 4/5 left hip flexion and abduction to improve stair climbing and squatting. 4. ? Function: LEFS score to 50% impaired or less to improve ADLs. 5. Independent with Home Exercise Programs    LTG: (to be met in 18 treatments)  1. Patient will be able to ambulate community distances without AD. 2. Patient will climb stairs with reciprocal gait pattern. 3. TUG of 13.5 seconds or less to decrease fall risk. Patient goals: Improve ambulation without AD, resume normal actvity. G-CODE    Functional Limitation:  Mobility  Functional Assessment Used: LEFS score 87% impaired  Current Status Modifier:  CM  Goal Status Modifier: CK  Discharge Status Modifier:     Rehab Potential:  [x] Good  [] Fair  [] Poor   Suggested Professional Referral:  [x] No  [] Yes:  Barriers to Goal Achievement[de-identified]  [x] No  [] Yes:  Domestic Concerns:  [x] No  [] Yes:    Pt. Education:  [x] Plans/Goals, Risks/Benefits discussed  [x] Home exercise program  Postop KARLA program  Method of Education: [x] Verbal  [x] Demo  [x] Written   Comprehension of Education:  [x] Verbalizes understanding. [x] Demonstrates understanding. [x] Needs Review. [] Demonstrates/verbalizes understanding of HEP/Ed previously given.     Treatment Plan:  [x] Therapeutic Exercise    [] Modalities:  [] Dry Needling  [x] Therapeutic Activity     [] Ultrasound  [x] Electrical Stimulation  [x] Gait Training     [] Massage       [] Lumbar/Cervical Traction  [x] Neuromuscular Re-education [x] Cold/hotpack [] Iontophoresis: 4 mg/mL  [x] Instruction in HEP             Dexamethasone Sodium  [x] Manual Therapy             Phosphate 40-80 mAmin  [] Aquatic Therapy  [x] Vasocompression/ [] Other:       Game Ready    []  Medication allergies reviewed for use of    Dexamethasone Sodium Phosphate 4mg/ml     with iontophoresis treatments. Pt is not allergic. Frequency:  3 x/week for 18 visits      Todays Treatment:  Modalities:   Precautions:  Exercises:  Exercise Reps/ Time Weight/ Level Comments   Marching 10x     3 way hip 10x     HS curls 10x     Calf raises 10x     Hip add 10x  Supine    Hip Abd 10x  supine   bridges 10x     Heel slides 10x     HS stretch 10x     Nu step 5 min     Other:    Specific Instructions for next treatment:  KARLA program Follow protocol in chart. Hip ROM, strengthening, gait and balance training. Treatment Charges: Mins Units   [x] Evaluation       [x]  Low       []  Moderate       []  High 20 1   []  Modalities     [x]  Ther Exercise 30 2   []  Manual Therapy     []  Ther Activities     []  Aquatics     []  Vasocompression     []  Other       TOTAL TREATMENT TIME: 50    Time in: 1100  Time Out:1200    Electronically signed by: Bernette Felty. Sherrine Barton

## 2017-11-20 ENCOUNTER — HOSPITAL ENCOUNTER (OUTPATIENT)
Dept: PHYSICAL THERAPY | Age: 67
Setting detail: THERAPIES SERIES
Discharge: HOME | End: 2017-11-20

## 2017-11-21 ENCOUNTER — HOSPITAL ENCOUNTER (OUTPATIENT)
Dept: PHYSICAL THERAPY | Age: 67
Setting detail: THERAPIES SERIES
Discharge: HOME | End: 2017-11-21
Payer: MEDICARE

## 2017-11-21 ENCOUNTER — CARE COORDINATION (OUTPATIENT)
Dept: CASE MANAGEMENT | Age: 67
End: 2017-11-21

## 2017-11-21 PROCEDURE — 97110 THERAPEUTIC EXERCISES: CPT

## 2017-11-21 NOTE — FLOWSHEET NOTE
[x] Pinky Cuevas       Outpatient Physical Therapy       [address]       Phone: [phone]       Fax: [phone]       Physical Therapy Daily Treatment Note    Date:  2017  Patient Name:  Kevin Medina    :  1950  MRN: [id number]  Physician: Yadira Gonzalez                      Insurance: Medicare  Medical Diagnosis: s/p total replacement of left hip Z96.642          Rehab Codes: R26.2, M25.652  Onset date: 17                                     Next 's appt.:        Visit# / total visits:               Cancels/No Shows: 0/0  G-Code due at visit#10    Subjective:  Pt says she has not felt any severe pain \"during the whole process\". She also says she has not had any bruising and can tell that she can continually move around better each day. Says she just started feeling the tightness/pulling in the suture sight today, no pain with it though. Pt says she ultimately wants to be able to go up and down the stairs with a reciprocal pattern again. Says she performs exercises at home everyday and uses ice at home when she knows she will be feeling sore.    Pain:  [x] Yes  [] No Location: L hip  Pain Rating: (0-10 scale) 2/10  Pain altered Tx:  [x] No  [] Yes  Action:  Comments:    Objective: Pt arrives in wheelchair and ambulates within therapy gym with RW  Modalities:   Precautions: Left anterior total hip 2017  Exercises:  Exercise Reps/ Time Weight/ Level Comments   Standing:   bilateral   Marching 10       Hip abduction 10       Hamstring curls 10       Calf raises 10              Supine: 10      Gluteal sets 10       Quad set 10     Hamstring set 10     Isometric hip adduction 10     Heel slides 10       Abduction slides 10     SAQ 10     SLR 10 AAROM difficult         Seated:      LAQ 10     Hamstring curls 10 Yellow  theraband   Hamstring stretches 10 5 sec stool   Hip flexion 10     Hip adduction 10 3 sec Ball squeezes   Hip abduction 10 yellow theraband           Nu step 10 min  level 2     Other:    Specific Instructions for next treatment: Continue to strengthen L hip    Treatment Charges: Mins Units   []  Modalities     [x]  Ther Exercise 27 2   []  Manual Therapy     []  Ther Activities     []  Aquatics     []  Vasocompression     []  Other     Total Treatment time 27        Assessment: [x] Progressing toward goals. - Good tolerance to additional exercises    [] No change. [x] Other: Pt has antalgic gait pattern when ambulating with RW, decreased L hip and knee flexion during swing through phase with a slight limp with weightbearing     STG: (to be met in 8 treatments)  1. ? Pain: 2/10 left hip pain with ambulation. 2. ? ROM: hip flexion to 100 degrees and abduction to 40 degrees to improve hip stiffness. 3. ? Strength: 4/5 left hip flexion and abduction to improve stair climbing and squatting. 4. ? Function: LEFS score to 50% impaired or less to improve ADLs. 5. Independent with Home Exercise Programs     LTG: (to be met in 18 treatments)  1. Patient will be able to ambulate community distances without AD. 2. Patient will climb stairs with reciprocal gait pattern. 3.  TUG of 13.5 seconds or less to decrease fall risk    Pt. Education:  [x] Yes  [] No  [] Reviewed Prior HEP/Ed  Method of Education: [x] Verbal  [x] Demo  [] Written  Comprehension of Education:  [x] Verbalizes understanding. [x] Demonstrates understanding. [] Needs review. [] Demonstrates/verbalizes HEP/Ed previously given. Plan: [x] Continue per plan of care.    [] Other:      Time In: 9:52am            Time Out: 10:29am    Electronically signed by:  Michel Palmer PTA  / ROCHELLE Arciniega

## 2017-11-22 ENCOUNTER — HOSPITAL ENCOUNTER (OUTPATIENT)
Dept: PHYSICAL THERAPY | Age: 67
Setting detail: THERAPIES SERIES
Discharge: HOME | End: 2017-11-22
Payer: MEDICARE

## 2017-11-22 PROCEDURE — 97110 THERAPEUTIC EXERCISES: CPT

## 2017-11-24 DIAGNOSIS — Z96.642 STATUS POST TOTAL HIP REPLACEMENT, LEFT: Primary | ICD-10-CM

## 2017-11-27 ENCOUNTER — HOSPITAL ENCOUNTER (OUTPATIENT)
Dept: PHYSICAL THERAPY | Age: 67
Setting detail: THERAPIES SERIES
Discharge: HOME | End: 2017-11-27
Payer: MEDICARE

## 2017-11-27 ENCOUNTER — OFFICE VISIT (OUTPATIENT)
Dept: ORTHOPEDIC SURGERY | Age: 67
End: 2017-11-27

## 2017-11-27 VITALS
HEART RATE: 85 BPM | DIASTOLIC BLOOD PRESSURE: 78 MMHG | HEIGHT: 66 IN | WEIGHT: 195 LBS | BODY MASS INDEX: 31.34 KG/M2 | SYSTOLIC BLOOD PRESSURE: 147 MMHG

## 2017-11-27 DIAGNOSIS — Z96.642 STATUS POST TOTAL HIP REPLACEMENT, LEFT: ICD-10-CM

## 2017-11-27 DIAGNOSIS — M16.12 ARTHRITIS OF LEFT HIP: Primary | ICD-10-CM

## 2017-11-27 PROCEDURE — 99024 POSTOP FOLLOW-UP VISIT: CPT | Performed by: ORTHOPAEDIC SURGERY

## 2017-11-27 PROCEDURE — 97110 THERAPEUTIC EXERCISES: CPT

## 2017-11-27 ASSESSMENT — PATIENT HEALTH QUESTIONNAIRE - PHQ9
1. LITTLE INTEREST OR PLEASURE IN DOING THINGS: 0
SUM OF ALL RESPONSES TO PHQ QUESTIONS 1-9: 0
2. FEELING DOWN, DEPRESSED OR HOPELESS: 0
SUM OF ALL RESPONSES TO PHQ9 QUESTIONS 1 & 2: 0

## 2017-11-27 ASSESSMENT — ENCOUNTER SYMPTOMS
NAUSEA: 0
CONSTIPATION: 0
COUGH: 0
DIARRHEA: 0

## 2017-11-27 NOTE — FLOWSHEET NOTE
theraband resistance, consider increase in reps      Treatment Charges: Mins Units   []  Modalities     [x]  Ther Exercise 26 2   []  Manual Therapy     []  Ther Activities     []  Aquatics     []  Vasocompression     []  Other     Total Treatment time 26        Assessment: [x] Progressing toward goals. - Good tolerance to addition of weight to supine and seated exercises    [] No change. [x] Other: Pt has antalgic gait pattern when ambulating with RW, wide ANDREW with left leg slightly abducted    STG: (to be met in 8 treatments)  1. ? Pain: 2/10 left hip pain with ambulation. 2. ? ROM: hip flexion to 100 degrees and abduction to 40 degrees to improve hip stiffness. 3. ? Strength: 4/5 left hip flexion and abduction to improve stair climbing and squatting. 4. ? Function: LEFS score to 50% impaired or less to improve ADLs. 5. Independent with Home Exercise Programs     LTG: (to be met in 18 treatments)  1. Patient will be able to ambulate community distances without AD. 2. Patient will climb stairs with reciprocal gait pattern. 3.  TUG of 13.5 seconds or less to decrease fall risk    Pt. Education:  [x] Yes  [] No  [x] Reviewed Prior HEP/Ed  Method of Education: [x] Verbal  [x] Demo  [] Written  Comprehension of Education:  [x] Verbalizes understanding. [x] Demonstrates understanding-calf stretches. [] Needs review. [x] Demonstrates/verbalizes HEP/Ed previously given. Plan: [x] Continue per plan of care. -decrease to once a week   [] Other:      Time In: 8:10am            Time Out: 8:46am    Electronically signed by:  Zachary Harris

## 2017-11-27 NOTE — PROGRESS NOTES
[address] [place] [address]  Dept: [phone]  Dept Fax: [phone]        Postoperative follow-up note    Subjective:   Shirley Kowalski is a 74y.o. year old female who presents to our office today for postoperative followup regarding her   1. Arthritis of left hip    2. Status post total hip replacement, left    . Chief Complaint   Patient presents with    Hip Pain     Left hip pain     HPI- Avery Luna is a 74y.o. year old female who presents to our office today for postoperative follow up regarding her left total hip arthroplasty. Her date of surgery was 11/14/2017, therefore she is 2 week(s) post op. She is taking percocet BID prn for pain. Patient is in therapy 3 times a week. Patient denies all other complaints. Patient has adhesive bandages intact. Patient says her only complaint during this whole time was constipation. Patient ambulates with rolling walker. Review of Systems   Constitutional: Negative for chills and fever. Respiratory: Negative for cough. Gastrointestinal: Negative for constipation, diarrhea and nausea. Musculoskeletal: Positive for arthralgias (left hip). Negative for gait problem, joint swelling and myalgias. Neurological: Negative for dizziness, weakness and numbness. I have reviewed the CC, HPI, ROS, PMH, FHX, Social History. I agree with the documentation provided by other staff, residents,and have reviewed their documentation prior to providing my signature indicating agreement. Objective :   General: Shirley Kowalski is a 74 y.o. female who is alert and oriented and sitting comfortably in our office. Ortho Exam  MS:  Incision left hip is healing well without signs of infection. Minimal swelling to the left thigh is appreciated. Motor, sensory, vascular examination to left large cavities grossly intact. Calves are supple bilaterally. Neuro: alert.  oriented  Eyes: Extra-ocular muscles intact  Mouth: Oral mucosa moist. No perioral lesions  Pulm: Respirations unlabored and regular. Skin: warm, well perfused  Psych:   Patient has good fund of knowledge and displays understanging of exam, diagnosis, and plan. Radiology:     History: Left total hip arthroplasty    Findings: Low AP pelvis, AP of the left hip, crosstable lateral x-rays left hip done the office today shows total hip arthroplasty in good position without complications. No evidence of fracture, subluxation, dislocation, radiopaque foreign body, radiopaque tumor, loosening of components is appreciated. Leg lengths appear approximate. Impression: Left total hip arthroplasty as described above. Assessment:      1. Arthritis of left hip    2. Status post total hip replacement, left         Plan:      Patient to continue with physical therapy, once a week is fine. Bandage removed from left hip. The patient may shower. Patient to follow up in 4 weeks. Follow up:Return in about 4 weeks (around 12/25/2017) for re- evaluation. Laura Cardoza RN am scribing for and in the presence of Dr. Eric Noyola  12/2/2017 10:38 AM    I have reviewed and made changes accordingly to the work scribed by Blair Saha RN. The documentation accurately reflects work and decisions made by me. I have also reviewed documentation completed by clinical staff. Eric Noyola DO, [address] [place] [address]  12/2/2017 10:40 AM      No orders of the defined types were placed in this encounter. No orders of the defined types were placed in this encounter.        Electronically signed by Rochelle Mckinnon DO, FAOAO on 12/2/2017 at 10:38 AM

## 2017-11-30 DIAGNOSIS — M16.12 OSTEOARTHRITIS OF LEFT HIP, UNSPECIFIED OSTEOARTHRITIS TYPE: ICD-10-CM

## 2017-12-01 ENCOUNTER — APPOINTMENT (OUTPATIENT)
Dept: PHYSICAL THERAPY | Age: 67
End: 2017-12-01
Payer: MEDICARE

## 2017-12-01 RX ORDER — MELOXICAM 15 MG/1
TABLET ORAL
Qty: 30 TABLET | Refills: 3 | Status: SHIPPED | OUTPATIENT
Start: 2017-12-01 | End: 2018-05-17 | Stop reason: ALTCHOICE

## 2017-12-04 ENCOUNTER — HOSPITAL ENCOUNTER (OUTPATIENT)
Dept: PHYSICAL THERAPY | Age: 67
Setting detail: THERAPIES SERIES
Discharge: HOME | End: 2017-12-04
Payer: MEDICARE

## 2017-12-04 PROCEDURE — 97110 THERAPEUTIC EXERCISES: CPT

## 2017-12-08 ENCOUNTER — CARE COORDINATION (OUTPATIENT)
Dept: CASE MANAGEMENT | Age: 67
End: 2017-12-08

## 2017-12-08 NOTE — CARE COORDINATION
Attempt to reach patient for [facility] Care Transitions. [facility] requesting return call. Contact information provided.

## 2017-12-11 ENCOUNTER — OFFICE VISIT (OUTPATIENT)
Dept: FAMILY MEDICINE CLINIC | Age: 67
End: 2017-12-11
Payer: MEDICARE

## 2017-12-11 ENCOUNTER — HOSPITAL ENCOUNTER (OUTPATIENT)
Dept: PHYSICAL THERAPY | Age: 67
Setting detail: THERAPIES SERIES
Discharge: HOME | End: 2017-12-11
Payer: MEDICARE

## 2017-12-11 VITALS
SYSTOLIC BLOOD PRESSURE: 134 MMHG | OXYGEN SATURATION: 96 % | WEIGHT: 210 LBS | HEART RATE: 74 BPM | RESPIRATION RATE: 20 BRPM | TEMPERATURE: 96.2 F | DIASTOLIC BLOOD PRESSURE: 78 MMHG | HEIGHT: 66 IN | BODY MASS INDEX: 33.75 KG/M2

## 2017-12-11 DIAGNOSIS — M85.80 OSTEOPENIA, UNSPECIFIED LOCATION: ICD-10-CM

## 2017-12-11 DIAGNOSIS — I10 ESSENTIAL HYPERTENSION: Primary | ICD-10-CM

## 2017-12-11 PROCEDURE — G8484 FLU IMMUNIZE NO ADMIN: HCPCS | Performed by: NURSE PRACTITIONER

## 2017-12-11 PROCEDURE — G8399 PT W/DXA RESULTS DOCUMENT: HCPCS | Performed by: NURSE PRACTITIONER

## 2017-12-11 PROCEDURE — 4040F PNEUMOC VAC/ADMIN/RCVD: CPT | Performed by: NURSE PRACTITIONER

## 2017-12-11 PROCEDURE — 3014F SCREEN MAMMO DOC REV: CPT | Performed by: NURSE PRACTITIONER

## 2017-12-11 PROCEDURE — G8978 MOBILITY CURRENT STATUS: HCPCS

## 2017-12-11 PROCEDURE — G8980 MOBILITY D/C STATUS: HCPCS

## 2017-12-11 PROCEDURE — 3017F COLORECTAL CA SCREEN DOC REV: CPT | Performed by: NURSE PRACTITIONER

## 2017-12-11 PROCEDURE — 1036F TOBACCO NON-USER: CPT | Performed by: NURSE PRACTITIONER

## 2017-12-11 PROCEDURE — 1111F DSCHRG MED/CURRENT MED MERGE: CPT | Performed by: NURSE PRACTITIONER

## 2017-12-11 PROCEDURE — G8427 DOCREV CUR MEDS BY ELIG CLIN: HCPCS | Performed by: NURSE PRACTITIONER

## 2017-12-11 PROCEDURE — G8979 MOBILITY GOAL STATUS: HCPCS

## 2017-12-11 PROCEDURE — 97110 THERAPEUTIC EXERCISES: CPT

## 2017-12-11 PROCEDURE — 1123F ACP DISCUSS/DSCN MKR DOCD: CPT | Performed by: NURSE PRACTITIONER

## 2017-12-11 PROCEDURE — 1090F PRES/ABSN URINE INCON ASSESS: CPT | Performed by: NURSE PRACTITIONER

## 2017-12-11 PROCEDURE — 99213 OFFICE O/P EST LOW 20 MIN: CPT | Performed by: NURSE PRACTITIONER

## 2017-12-11 PROCEDURE — G8417 CALC BMI ABV UP PARAM F/U: HCPCS | Performed by: NURSE PRACTITIONER

## 2017-12-11 RX ORDER — LISINOPRIL AND HYDROCHLOROTHIAZIDE 20; 12.5 MG/1; MG/1
TABLET ORAL
Qty: 30 TABLET | Refills: 5 | Status: SHIPPED | OUTPATIENT
Start: 2017-12-11 | End: 2018-06-12 | Stop reason: SDUPTHER

## 2017-12-11 RX ORDER — CALCIUM CARBONATE/VITAMIN D3 600 MG-10
1 TABLET ORAL DAILY
Qty: 30 TABLET | Refills: 5 | Status: SHIPPED | OUTPATIENT
Start: 2017-12-11 | End: 2021-08-06

## 2017-12-11 ASSESSMENT — ENCOUNTER SYMPTOMS: SHORTNESS OF BREATH: 0

## 2017-12-11 NOTE — PROGRESS NOTES
[address] GARCIA  [phone]    Kelly Rushing is a 74 y.o. female who presents today for her  medical conditions/complaints as noted below. Kelly Rushing is c/o of Hypertension    HPI:     Hypertension   This is a chronic problem. The problem is controlled. Pertinent negatives include no chest pain, palpitations, peripheral edema or shortness of breath. Risk factors for coronary artery disease include obesity. Past treatments include ACE inhibitors. There are no compliance problems. She had her hip replaced 4 weeks ago. She is feeling much better. She did stay in the hospital for two nights. She is going to sign up for [facility] weight loss center. Nursing note reviewed and discussed with patient. Patient's medications, allergies, past medical, surgical, social and family histories were reviewed and updated as appropriate. Current Outpatient Prescriptions on File Prior to Visit   Medication Sig Dispense Refill    meloxicam (MOBIC) 15 MG tablet take 1 tablet by mouth once daily 30 tablet 3    aspirin 81 MG EC tablet Take 1 tablet by mouth 2 times daily 84 tablet 0    cyclobenzaprine (FLEXERIL) 5 MG tablet take 1 tablet by mouth every 8 hours if needed for muscle spasm 30 tablet 1    Handicap Placard MISC by Does not apply route Duration 4- through 4- 1 each 0    mupirocin (BACTROBAN NASAL) 2 % nasal ointment Take by Nasal route 2 times daily for 5 days 1 Tube 0     No current facility-administered medications on file prior to visit.       Past Medical History:   Diagnosis Date    Arthritis     osteoarthritis    Hypertension       Past Surgical History:   Procedure Laterality Date    CHOLECYSTECTOMY, LAPAROSCOPIC  2012    DILATION AND CURETTAGE OF UTERUS  1982    HIP ARTHROPLASTY Left     SD TOTAL HIP ARTHROPLASTY Left 11/14/2017    HIP TOTAL ARTHROPLASTY ANTERIOR APPROACH (Terrence Munguia ILIACA BLOCK VS. LUMBAR PLEXUS PRE-OP, SPINAL VS. GENERAL, MEDACTA TABLE, C-ARM) performed by Brenda Casiano DO at [address] Bilateral     as child (lazy eye?? )    TUBAL LIGATION       Family History   Problem Relation Age of Onset    Heart Disease Mother     Diabetes Father     Diabetes Brother      Social History   Substance Use Topics    Smoking status: Never Smoker    Smokeless tobacco: Never Used    Alcohol use Yes      Comment: 1 glass per month      Allergies   Allergen Reactions    Morphine Other (See Comments)     Does not like how it makes her feel       Subjective:      Review of Systems   Respiratory: Negative for shortness of breath. Cardiovascular: Negative for chest pain and palpitations. Musculoskeletal: Positive for arthralgias. Back pain: left hip improving. Other pertinent ROS in HPI  Objective:     /78 (Site: Left Arm, Position: Sitting, Cuff Size: Large Adult)   Pulse 74   Temp 96.2 °F (35.7 °C) (Oral)   Resp 20   Ht 5' 5.98\" (1.676 m)   Wt 210 lb (95.3 kg)   SpO2 96%   BMI 33.91 kg/m²    Physical Exam   Constitutional: She is oriented to person, place, and time. She appears well-developed and well-nourished. No distress. HENT:   Head: Normocephalic and atraumatic. Right Ear: External ear normal.   Left Ear: External ear normal.   Nose: Nose normal.   Mouth/Throat: Oropharynx is clear and moist.   Eyes: Conjunctivae and EOM are normal. Pupils are equal, round, and reactive to light. Neck: Trachea normal, normal range of motion and full passive range of motion without pain. No thyroid mass present. Cardiovascular: Normal rate, regular rhythm, S1 normal, S2 normal and normal heart sounds. Exam reveals no distant heart sounds and no friction rub. Pulmonary/Chest: Effort normal and breath sounds normal. No accessory muscle usage. No respiratory distress. Abdominal: Soft.  Bowel sounds are normal. She exhibits no distension, no ascites and no mass. Musculoskeletal:        Left hip: She exhibits tenderness. She exhibits normal range of motion and normal strength. Painful ROM, but improving     Lymphadenopathy:     She has no cervical adenopathy. Neurological: She is oriented to person, place, and time. Gait is normal.   Skin: Skin is warm and dry. No rash noted. She is not diaphoretic. Psychiatric: She has a normal mood and affect. Her behavior is normal. Judgment and thought content normal.     Assessment/PLAN     1. Essential hypertension  Continue med  - lisinopril-hydrochlorothiazide (PRINZIDE;ZESTORETIC) 20-12.5 MG per tablet; take 1 tablet by mouth once daily  Dispense: 30 tablet; Refill: 5    2. Osteopenia, unspecified location    - Calcium Carb-Cholecalciferol (CALCIUM-VITAMIN D) 600-400 MG-UNIT TABS; Take 1 Dose by mouth daily  Dispense: 30 tablet; Refill: 5       RTO if symptoms worsen or fail to improve  Pt agreeable with plan      Patient given educational materials - see patient instructions. Discussed use, benefit, and side effects of prescribed medications. All patient questions answered. Pt voiced understanding. Reviewed health maintenance. Instructed to continue current medications, diet and exercise. 1.  Phillip Peterson received counseling on the following healthy behaviors: exercise and medication adherence  2. Patient given educational materials when available - see patient instructions when applicable  3. Discussed use, benefit, and side effects of prescribed medications. Barriers to medication compliance addressed. All patient questions answered. Pt voiced understanding. 4.  Reviewed prior labs and health maintenance  5. Continue current medications, diet and exercise.     Completed Refills   Requested Prescriptions     Signed Prescriptions Disp Refills    lisinopril-hydrochlorothiazide (PRINZIDE;ZESTORETIC) 20-12.5 MG per tablet 30 tablet 5     Sig: take 1 tablet by mouth once daily    Calcium Carb-Cholecalciferol (CALCIUM-VITAMIN D) 600-400 MG-UNIT TABS 30 tablet 5     Sig: Take 1 Dose by mouth daily         Electronically signed by Giles Scales CNP on 12/11/2017 at 8:57 AM

## 2017-12-11 NOTE — FLOWSHEET NOTE
[x] Tawana Rivas       Outpatient Physical Therapy       [address]       Phone: [phone]       Fax: [phone]       Physical Therapy Daily Treatment Note    Date:  2017  Patient Name:  Ajay Ruiz    :  1950  MRN: [id number]  Physician: [id number]: Medicare  Medical Diagnosis: s/p total replacement of left hip Z96.642          Rehab Codes: R26.2, M25.652  Onset date: 17                                     Next 's appt.: Dec 27th       Visit# / total visits:               Cancels/No Shows: 0/0  G-Code due at visit#10    Subjective:    Pain:  [] Yes  [x] No Location: L hip  Pain Rating: (0-10 scale) 0/10  Pain altered Tx:  [x] No  [] Yes  Action:    Comments:  Patient states she sees improvement every day.   Has been having dull aching in SI joint since having an xray and being on the table a long time  Objective: Pt arrives to therapy gym walking without device  Modalities:   Precautions: Left anterior total hip 2017  Exercises:  Exercise Reps/Time Weight/ Level Comments   Standing:   bilateral   Marching 15  1 lb     Hip abduction 10  1 lb  alternate sides   Hamstring curls 10  1 lb    Calf raises 10     Calf stretches  3 20 sec wedge   Step ups-bilateral 10 ea  4 inch    Step down 10  4 inch          Supine:       Gluteal sets 20     Quad set 20     Hamstring set 20     Heel slides 20  2 lb    Abduction slides 20  2 lb    SAQ 20  2 lb    SLR 10 x 2 AAROM    Hip abduction 15 AROM Side lying   Prone:      Quad stretch  5 20 sec          Seated:      LAQ 20  2 lb    Hamstring curls 20 red theraband   Hamstring stretches  3 ea 20 sec stool   Hip adduction 20 3 sec Ball squeezes   Hip abduction 20 red theraband           Nu step 10 min  level 2     Other:  Increased resistance with Theraband  and mat exercises, added weight to standing exercises and added step down from 4 inch step    Specific Instructions for next

## 2017-12-18 ENCOUNTER — HOSPITAL ENCOUNTER (OUTPATIENT)
Dept: PHYSICAL THERAPY | Age: 67
Setting detail: THERAPIES SERIES
Discharge: HOME | End: 2017-12-18
Payer: MEDICARE

## 2017-12-18 NOTE — FLOWSHEET NOTE
[x] Jatinder Ramírez        Outpatient Physical                Therapy       [address]       Phone: [phone]       Fax: [phone] [] [facility]       Promotion at [address] [place] [address]       Phone: [phone]       Fax: [phone] [] [facility]      for Health Promotion     [address] [place] [address]      Phone: [phone]      Fax:  [phone]     Physical Therapy Cancel/No Show note    Date: 2017  Patient: Corinne Blade  : 1950  MRN: [id number]    Cancels/No Shows to date:     For today's appointment patient:  [x]  Cancelled  []  Rescheduled appointment  []  No-show     Reason given by patient:  [x]  Patient ill  []  Conflicting appointment  []  No transportation    []  Conflict with work  []  No reason given  []  Weather related  []  Other:      Comments:      [x]  Next appointment was confirmed     Electronically signed by: Lizzy Mao PTA

## 2017-12-27 ENCOUNTER — OFFICE VISIT (OUTPATIENT)
Dept: ORTHOPEDIC SURGERY | Age: 67
End: 2017-12-27

## 2017-12-27 VITALS — WEIGHT: 210.1 LBS | HEIGHT: 66 IN | BODY MASS INDEX: 33.77 KG/M2

## 2017-12-27 DIAGNOSIS — M16.12 OSTEOARTHRITIS OF LEFT HIP, UNSPECIFIED OSTEOARTHRITIS TYPE: Primary | ICD-10-CM

## 2017-12-27 DIAGNOSIS — Z96.642 STATUS POST TOTAL HIP REPLACEMENT, LEFT: ICD-10-CM

## 2017-12-27 PROCEDURE — 99024 POSTOP FOLLOW-UP VISIT: CPT | Performed by: ORTHOPAEDIC SURGERY

## 2017-12-27 ASSESSMENT — ENCOUNTER SYMPTOMS
NAUSEA: 0
CONSTIPATION: 0
COUGH: 0
DIARRHEA: 0

## 2018-01-16 ENCOUNTER — OFFICE VISIT (OUTPATIENT)
Dept: FAMILY MEDICINE CLINIC | Age: 68
End: 2018-01-16
Payer: MEDICARE

## 2018-01-16 VITALS
HEART RATE: 90 BPM | SYSTOLIC BLOOD PRESSURE: 134 MMHG | WEIGHT: 211 LBS | DIASTOLIC BLOOD PRESSURE: 84 MMHG | RESPIRATION RATE: 20 BRPM | BODY MASS INDEX: 33.91 KG/M2 | OXYGEN SATURATION: 99 % | HEIGHT: 66 IN | TEMPERATURE: 97.3 F

## 2018-01-16 DIAGNOSIS — M25.561 ACUTE PAIN OF RIGHT KNEE: ICD-10-CM

## 2018-01-16 DIAGNOSIS — M53.3 CHRONIC SI JOINT PAIN: Primary | ICD-10-CM

## 2018-01-16 DIAGNOSIS — G89.29 CHRONIC SI JOINT PAIN: Primary | ICD-10-CM

## 2018-01-16 PROCEDURE — 3014F SCREEN MAMMO DOC REV: CPT | Performed by: NURSE PRACTITIONER

## 2018-01-16 PROCEDURE — G8417 CALC BMI ABV UP PARAM F/U: HCPCS | Performed by: NURSE PRACTITIONER

## 2018-01-16 PROCEDURE — 3017F COLORECTAL CA SCREEN DOC REV: CPT | Performed by: NURSE PRACTITIONER

## 2018-01-16 PROCEDURE — G8399 PT W/DXA RESULTS DOCUMENT: HCPCS | Performed by: NURSE PRACTITIONER

## 2018-01-16 PROCEDURE — 1036F TOBACCO NON-USER: CPT | Performed by: NURSE PRACTITIONER

## 2018-01-16 PROCEDURE — G8427 DOCREV CUR MEDS BY ELIG CLIN: HCPCS | Performed by: NURSE PRACTITIONER

## 2018-01-16 PROCEDURE — 1123F ACP DISCUSS/DSCN MKR DOCD: CPT | Performed by: NURSE PRACTITIONER

## 2018-01-16 PROCEDURE — 4040F PNEUMOC VAC/ADMIN/RCVD: CPT | Performed by: NURSE PRACTITIONER

## 2018-01-16 PROCEDURE — 1090F PRES/ABSN URINE INCON ASSESS: CPT | Performed by: NURSE PRACTITIONER

## 2018-01-16 PROCEDURE — 99213 OFFICE O/P EST LOW 20 MIN: CPT | Performed by: NURSE PRACTITIONER

## 2018-01-16 PROCEDURE — G8484 FLU IMMUNIZE NO ADMIN: HCPCS | Performed by: NURSE PRACTITIONER

## 2018-01-16 RX ORDER — IBUPROFEN 800 MG/1
800 TABLET ORAL EVERY 8 HOURS PRN
Qty: 40 TABLET | Refills: 0 | Status: SHIPPED | OUTPATIENT
Start: 2018-01-16 | End: 2018-03-20 | Stop reason: ALTCHOICE

## 2018-01-16 RX ORDER — TIZANIDINE 4 MG/1
4 TABLET ORAL EVERY 6 HOURS PRN
Qty: 25 TABLET | Refills: 0 | Status: SHIPPED | OUTPATIENT
Start: 2018-01-16 | End: 2018-01-30

## 2018-01-16 ASSESSMENT — ENCOUNTER SYMPTOMS: BACK PAIN: 1

## 2018-01-16 NOTE — PROGRESS NOTES
Have you seen any other physician or provider since your last visit yes - Dr. Benson Delgadillo    Have you had any other diagnostic tests since your last visit? no    Have you changed or stopped any medications since your last visit including any over-the-counter medicines, vitamins, or herbal medicines? no     Are you taking all your prescribed medications? Yes  If NO, why? -  N/A           Patient Self-Management Goal for this visit.    What is your goal for your visit today? - rt knee and lower back pain   Barriers to success: none   Plan for overcoming my barriers: N/A      Confidence: 10/10   Date goal set: 1/16/18   Date expected to reach goal: 3days    Medical history Review  Past Medical, Family, and Social History reviewed and does not contribute to the patient presenting condition    Health Maintenance Due   Topic Date Due    Zostavax vaccine  03/15/2012
addressed. All patient questions answered. Pt voiced understanding. 4.  Reviewed prior labs and health maintenance  5. Continue current medications, diet and exercise.     Completed Refills   Requested Prescriptions     Signed Prescriptions Disp Refills    tiZANidine (ZANAFLEX) 4 MG tablet 25 tablet 0     Sig: Take 1 tablet by mouth every 6 hours as needed (si pain)    ibuprofen (ADVIL;MOTRIN) 800 MG tablet 40 tablet 0     Sig: Take 1 tablet by mouth every 8 hours as needed for Pain         Electronically signed by Barbara Velazquez CNP on 1/16/2018 at 9:29 PM

## 2018-01-30 ENCOUNTER — OFFICE VISIT (OUTPATIENT)
Dept: BARIATRICS/WEIGHT MGMT | Age: 68
End: 2018-01-30
Payer: MEDICARE

## 2018-01-30 VITALS
BODY MASS INDEX: 36.54 KG/M2 | DIASTOLIC BLOOD PRESSURE: 78 MMHG | WEIGHT: 214 LBS | HEART RATE: 80 BPM | SYSTOLIC BLOOD PRESSURE: 118 MMHG | HEIGHT: 64 IN

## 2018-01-30 DIAGNOSIS — I10 ESSENTIAL HYPERTENSION: Primary | ICD-10-CM

## 2018-01-30 DIAGNOSIS — E66.9 OBESITY (BMI 30-39.9): ICD-10-CM

## 2018-01-30 DIAGNOSIS — Z96.642 STATUS POST TOTAL REPLACEMENT OF LEFT HIP: ICD-10-CM

## 2018-01-30 DIAGNOSIS — M85.80 OSTEOPENIA, UNSPECIFIED LOCATION: ICD-10-CM

## 2018-01-30 PROBLEM — M16.12 OSTEOARTHRITIS OF LEFT HIP: Status: RESOLVED | Noted: 2017-03-23 | Resolved: 2018-01-30

## 2018-01-30 PROBLEM — M76.899 HIP TENDONITIS: Status: RESOLVED | Noted: 2017-02-10 | Resolved: 2018-01-30

## 2018-01-30 PROCEDURE — G8484 FLU IMMUNIZE NO ADMIN: HCPCS | Performed by: NURSE PRACTITIONER

## 2018-01-30 PROCEDURE — 1090F PRES/ABSN URINE INCON ASSESS: CPT | Performed by: NURSE PRACTITIONER

## 2018-01-30 PROCEDURE — 3017F COLORECTAL CA SCREEN DOC REV: CPT | Performed by: NURSE PRACTITIONER

## 2018-01-30 PROCEDURE — G8427 DOCREV CUR MEDS BY ELIG CLIN: HCPCS | Performed by: NURSE PRACTITIONER

## 2018-01-30 PROCEDURE — 3014F SCREEN MAMMO DOC REV: CPT | Performed by: NURSE PRACTITIONER

## 2018-01-30 PROCEDURE — 1123F ACP DISCUSS/DSCN MKR DOCD: CPT | Performed by: NURSE PRACTITIONER

## 2018-01-30 PROCEDURE — 99215 OFFICE O/P EST HI 40 MIN: CPT | Performed by: NURSE PRACTITIONER

## 2018-01-30 PROCEDURE — 1036F TOBACCO NON-USER: CPT | Performed by: NURSE PRACTITIONER

## 2018-01-30 PROCEDURE — G8399 PT W/DXA RESULTS DOCUMENT: HCPCS | Performed by: NURSE PRACTITIONER

## 2018-01-30 PROCEDURE — G8417 CALC BMI ABV UP PARAM F/U: HCPCS | Performed by: NURSE PRACTITIONER

## 2018-01-30 PROCEDURE — 4040F PNEUMOC VAC/ADMIN/RCVD: CPT | Performed by: NURSE PRACTITIONER

## 2018-01-30 NOTE — PROGRESS NOTES
abdominal distention. Endocrine: Negative for polydipsia, polyphagia and polyuria. Genitourinary: Negative for dysuria, frequency, hematuria and difficulty urinating. Musculoskeletal: Negative for myalgias, joint swelling. Skin: Negative for pallor and rash. Neurological: Negative for dizziness, tremors, light-headedness and headaches. Psychiatric/Behavioral: Negative for sleep disturbance and dysphoric mood. Objective:      Physical Exam   Vital signs reviewed. General: Well-developed and well-nourished. No acute distress. Skin: Warm, dry and intact. HEENT: Normocephalic. EOMs intact. Conjunctivae normal. Neck supple. Cardiovascular: Normal rate, regular rhythm. No murmur. Pulmonary/Chest: Normal effort. Lungs clear to auscultation. No rales, rhonchi or wheezing. Abdominal: Positive bowel sounds. Soft, nontender. Nondistended. Musculoskeletal: Movement x4. No edema. Neurological: Gait normal. Alert and oriented to person, place, and time. Psychiatric: Normal mood and affect. Speech and behavior normal. Judgment and thought content normal. Cognition and memory intact. Assessment:      1. Essential hypertension  CBC Auto Differential    Comprehensive Metabolic Panel    Lipid Panel    TSH without Reflex    Uric Acid   2. Status post total replacement of left hip  CBC Auto Differential    Comprehensive Metabolic Panel    Lipid Panel    TSH without Reflex    Uric Acid   3. Obesity (BMI 30-39. 9)  CBC Auto Differential    Comprehensive Metabolic Panel    Lipid Panel    TSH without Reflex    Uric Acid   4. Osteopenia, unspecified location  CBC Auto Differential    Comprehensive Metabolic Panel    Lipid Panel    TSH without Reflex    Uric Acid       Plan:      Patient would like to do Decision Free 3+2 diet. Type of shake is . Advised patient on consistently getting in minimum food prescription and practicing \"More is Better\" to stay full.   Instructed patient to order minimum

## 2018-02-01 ENCOUNTER — HOSPITAL ENCOUNTER (OUTPATIENT)
Age: 68
Setting detail: SPECIMEN
Discharge: HOME | End: 2018-02-01
Payer: MEDICARE

## 2018-02-01 DIAGNOSIS — Z96.642 STATUS POST TOTAL REPLACEMENT OF LEFT HIP: ICD-10-CM

## 2018-02-01 DIAGNOSIS — M85.80 OSTEOPENIA, UNSPECIFIED LOCATION: ICD-10-CM

## 2018-02-01 DIAGNOSIS — E66.9 OBESITY (BMI 30-39.9): ICD-10-CM

## 2018-02-01 DIAGNOSIS — I10 ESSENTIAL HYPERTENSION: ICD-10-CM

## 2018-02-01 LAB
ABSOLUTE EOS #: 0.07 K/UL (ref 0–0.44)
ABSOLUTE IMMATURE GRANULOCYTE: <0.03 K/UL (ref 0–0.3)
ABSOLUTE LYMPH #: 1.84 K/UL (ref 1.1–3.7)
ABSOLUTE MONO #: 0.44 K/UL (ref 0.1–1.2)
ALBUMIN SERPL-MCNC: 4.1 G/DL (ref 3.5–5.2)
ALBUMIN/GLOBULIN RATIO: 1.2 (ref 1–2.5)
ALP BLD-CCNC: 144 U/L (ref 35–104)
ALT SERPL-CCNC: 27 U/L (ref 5–33)
ANION GAP SERPL CALCULATED.3IONS-SCNC: 14 MMOL/L (ref 9–17)
AST SERPL-CCNC: 26 U/L
BASOPHILS # BLD: 1 % (ref 0–2)
BASOPHILS ABSOLUTE: 0.04 K/UL (ref 0–0.2)
BILIRUB SERPL-MCNC: 0.32 MG/DL (ref 0.3–1.2)
BUN BLDV-MCNC: 26 MG/DL (ref 8–23)
BUN/CREAT BLD: ABNORMAL (ref 9–20)
CALCIUM SERPL-MCNC: 9.6 MG/DL (ref 8.6–10.4)
CHLORIDE BLD-SCNC: 96 MMOL/L (ref 98–107)
CHOLESTEROL/HDL RATIO: 2.5
CHOLESTEROL: 182 MG/DL
CO2: 25 MMOL/L (ref 20–31)
CREAT SERPL-MCNC: 0.82 MG/DL (ref 0.5–0.9)
DIFFERENTIAL TYPE: ABNORMAL
EOSINOPHILS RELATIVE PERCENT: 1 % (ref 1–4)
GFR AFRICAN AMERICAN: >60 ML/MIN
GFR NON-AFRICAN AMERICAN: >60 ML/MIN
GFR SERPL CREATININE-BSD FRML MDRD: ABNORMAL ML/MIN/{1.73_M2}
GFR SERPL CREATININE-BSD FRML MDRD: ABNORMAL ML/MIN/{1.73_M2}
GLUCOSE BLD-MCNC: 109 MG/DL (ref 70–99)
HCT VFR BLD CALC: 40.2 % (ref 36.3–47.1)
HDLC SERPL-MCNC: 72 MG/DL
HEMOGLOBIN: 12.7 G/DL (ref 11.9–15.1)
IMMATURE GRANULOCYTES: 0 %
LDL CHOLESTEROL: 75 MG/DL (ref 0–130)
LYMPHOCYTES # BLD: 28 % (ref 24–43)
MCH RBC QN AUTO: 27.4 PG (ref 25.2–33.5)
MCHC RBC AUTO-ENTMCNC: 31.6 G/DL (ref 28.4–34.8)
MCV RBC AUTO: 86.6 FL (ref 82.6–102.9)
MONOCYTES # BLD: 7 % (ref 3–12)
NRBC AUTOMATED: 0 PER 100 WBC
PDW BLD-RTO: 14.5 % (ref 11.8–14.4)
PLATELET # BLD: 255 K/UL (ref 138–453)
PLATELET ESTIMATE: ABNORMAL
PMV BLD AUTO: 12 FL (ref 8.1–13.5)
POTASSIUM SERPL-SCNC: 4.3 MMOL/L (ref 3.7–5.3)
RBC # BLD: 4.64 M/UL (ref 3.95–5.11)
RBC # BLD: ABNORMAL 10*6/UL
SEG NEUTROPHILS: 63 % (ref 36–65)
SEGMENTED NEUTROPHILS ABSOLUTE COUNT: 4.11 K/UL (ref 1.5–8.1)
SODIUM BLD-SCNC: 135 MMOL/L (ref 135–144)
TOTAL PROTEIN: 7.4 G/DL (ref 6.4–8.3)
TRIGL SERPL-MCNC: 176 MG/DL
TSH SERPL DL<=0.05 MIU/L-ACNC: 3.82 MIU/L (ref 0.3–5)
URIC ACID: 7.2 MG/DL (ref 2.4–5.7)
VLDLC SERPL CALC-MCNC: ABNORMAL MG/DL (ref 1–30)
WBC # BLD: 6.5 K/UL (ref 3.5–11.3)
WBC # BLD: ABNORMAL 10*3/UL

## 2018-02-02 ENCOUNTER — TELEPHONE (OUTPATIENT)
Dept: BARIATRICS/WEIGHT MGMT | Age: 68
End: 2018-02-02

## 2018-02-02 DIAGNOSIS — R73.9 HYPERGLYCEMIA: Primary | ICD-10-CM

## 2018-02-02 PROBLEM — E78.1 HIGH TRIGLYCERIDES: Status: ACTIVE | Noted: 2018-02-02

## 2018-02-06 ENCOUNTER — CARE COORDINATION (OUTPATIENT)
Dept: CASE MANAGEMENT | Age: 68
End: 2018-02-06

## 2018-02-22 ENCOUNTER — OFFICE VISIT (OUTPATIENT)
Dept: BARIATRICS/WEIGHT MGMT | Age: 68
End: 2018-02-22
Payer: MEDICARE

## 2018-02-22 VITALS
SYSTOLIC BLOOD PRESSURE: 124 MMHG | BODY MASS INDEX: 35.3 KG/M2 | HEIGHT: 64 IN | WEIGHT: 206.8 LBS | DIASTOLIC BLOOD PRESSURE: 78 MMHG | HEART RATE: 68 BPM | RESPIRATION RATE: 20 BRPM

## 2018-02-22 DIAGNOSIS — E66.9 OBESITY (BMI 30-39.9): ICD-10-CM

## 2018-02-22 DIAGNOSIS — R73.9 HYPERGLYCEMIA: ICD-10-CM

## 2018-02-22 DIAGNOSIS — E78.1 HIGH TRIGLYCERIDES: ICD-10-CM

## 2018-02-22 DIAGNOSIS — M85.80 OSTEOPENIA, UNSPECIFIED LOCATION: ICD-10-CM

## 2018-02-22 DIAGNOSIS — Z96.642 STATUS POST TOTAL REPLACEMENT OF LEFT HIP: ICD-10-CM

## 2018-02-22 DIAGNOSIS — I10 ESSENTIAL HYPERTENSION: Primary | ICD-10-CM

## 2018-02-22 PROCEDURE — 3017F COLORECTAL CA SCREEN DOC REV: CPT | Performed by: NURSE PRACTITIONER

## 2018-02-22 PROCEDURE — G8427 DOCREV CUR MEDS BY ELIG CLIN: HCPCS | Performed by: NURSE PRACTITIONER

## 2018-02-22 PROCEDURE — G8399 PT W/DXA RESULTS DOCUMENT: HCPCS | Performed by: NURSE PRACTITIONER

## 2018-02-22 PROCEDURE — G8484 FLU IMMUNIZE NO ADMIN: HCPCS | Performed by: NURSE PRACTITIONER

## 2018-02-22 PROCEDURE — 3014F SCREEN MAMMO DOC REV: CPT | Performed by: NURSE PRACTITIONER

## 2018-02-22 PROCEDURE — 99213 OFFICE O/P EST LOW 20 MIN: CPT | Performed by: NURSE PRACTITIONER

## 2018-02-22 PROCEDURE — 1036F TOBACCO NON-USER: CPT | Performed by: NURSE PRACTITIONER

## 2018-02-22 PROCEDURE — 1090F PRES/ABSN URINE INCON ASSESS: CPT | Performed by: NURSE PRACTITIONER

## 2018-02-22 PROCEDURE — 4040F PNEUMOC VAC/ADMIN/RCVD: CPT | Performed by: NURSE PRACTITIONER

## 2018-02-22 PROCEDURE — 1123F ACP DISCUSS/DSCN MKR DOCD: CPT | Performed by: NURSE PRACTITIONER

## 2018-02-22 PROCEDURE — G8417 CALC BMI ABV UP PARAM F/U: HCPCS | Performed by: NURSE PRACTITIONER

## 2018-02-23 ENCOUNTER — OFFICE VISIT (OUTPATIENT)
Dept: FAMILY MEDICINE CLINIC | Age: 68
End: 2018-02-23
Payer: MEDICARE

## 2018-02-23 ENCOUNTER — HOSPITAL ENCOUNTER (OUTPATIENT)
Dept: VASCULAR LAB | Age: 68
Discharge: HOME | End: 2018-02-23
Payer: MEDICARE

## 2018-02-23 VITALS
RESPIRATION RATE: 20 BRPM | OXYGEN SATURATION: 96 % | BODY MASS INDEX: 35 KG/M2 | WEIGHT: 205 LBS | DIASTOLIC BLOOD PRESSURE: 75 MMHG | SYSTOLIC BLOOD PRESSURE: 120 MMHG | TEMPERATURE: 97 F | HEART RATE: 89 BPM | HEIGHT: 64 IN

## 2018-02-23 DIAGNOSIS — M79.89 CALF SWELLING: ICD-10-CM

## 2018-02-23 DIAGNOSIS — M79.662 PAIN OF LEFT CALF: Primary | ICD-10-CM

## 2018-02-23 DIAGNOSIS — M79.662 PAIN OF LEFT CALF: ICD-10-CM

## 2018-02-23 PROCEDURE — G8399 PT W/DXA RESULTS DOCUMENT: HCPCS | Performed by: NURSE PRACTITIONER

## 2018-02-23 PROCEDURE — 3014F SCREEN MAMMO DOC REV: CPT | Performed by: NURSE PRACTITIONER

## 2018-02-23 PROCEDURE — G8484 FLU IMMUNIZE NO ADMIN: HCPCS | Performed by: NURSE PRACTITIONER

## 2018-02-23 PROCEDURE — 99213 OFFICE O/P EST LOW 20 MIN: CPT | Performed by: NURSE PRACTITIONER

## 2018-02-23 PROCEDURE — 93971 EXTREMITY STUDY: CPT

## 2018-02-23 PROCEDURE — G8427 DOCREV CUR MEDS BY ELIG CLIN: HCPCS | Performed by: NURSE PRACTITIONER

## 2018-02-23 PROCEDURE — 1090F PRES/ABSN URINE INCON ASSESS: CPT | Performed by: NURSE PRACTITIONER

## 2018-02-23 PROCEDURE — 1036F TOBACCO NON-USER: CPT | Performed by: NURSE PRACTITIONER

## 2018-02-23 PROCEDURE — 3017F COLORECTAL CA SCREEN DOC REV: CPT | Performed by: NURSE PRACTITIONER

## 2018-02-23 PROCEDURE — 4040F PNEUMOC VAC/ADMIN/RCVD: CPT | Performed by: NURSE PRACTITIONER

## 2018-02-23 PROCEDURE — G8417 CALC BMI ABV UP PARAM F/U: HCPCS | Performed by: NURSE PRACTITIONER

## 2018-02-23 PROCEDURE — 1123F ACP DISCUSS/DSCN MKR DOCD: CPT | Performed by: NURSE PRACTITIONER

## 2018-02-23 RX ORDER — CALCIUM CARBONATE 500(1250)
500 TABLET ORAL DAILY
COMMUNITY
End: 2018-04-11 | Stop reason: ALTCHOICE

## 2018-02-23 NOTE — PROGRESS NOTES
Visit Information    Have you changed or started any medications since your last visit including any over-the-counter medicines, vitamins, or herbal medicines? no   Have you stopped taking any of your medications? Is so, why? -  yes -   Are you having any side effects from any of your medications? - no    Have you seen any other physician or provider since your last visit? yes - weight loss people   Have you had any other diagnostic tests since your last visit?  no   Have you been seen in the emergency room and/or had an admission in a hospital since we last saw you?  no   Have you had your routine dental cleaning in the past 6 months?  yes -      Do you have an active MyChart account? If no, what is the barrier?   Yes    Patient Care Team:  Fidel Laura CNP as PCP - General (Certified Nurse Practitioner)  Fidel Laura CNP as Referring Physician (Certified Nurse Practitioner)    Medical History Review  Past Medical, Family, and Social History reviewed and does not contribute to the patient presenting condition    Health Maintenance   Topic Date Due    Shingles Vaccine (1 of 2 - 2 Dose Series) 03/15/2002    Colon Cancer Screen FIT/FOBT  03/24/2018    Breast cancer screen  08/03/2018    Pneumococcal low/med risk (2 of 2 - PPSV23) 08/21/2018    A1C test (Diabetic or Prediabetic)  08/31/2018    Potassium monitoring  02/01/2019    Creatinine monitoring  02/01/2019    Cervical cancer screen  07/20/2019    Lipid screen  02/01/2023    DTaP/Tdap/Td vaccine (2 - Td) 08/21/2027    DEXA (modify frequency per FRAX score)  Completed    Flu vaccine  Completed    Hepatitis C screen  Completed    HIV screen  Completed

## 2018-02-25 ASSESSMENT — ENCOUNTER SYMPTOMS
SHORTNESS OF BREATH: 0
COUGH: 0

## 2018-02-27 ENCOUNTER — TELEPHONE (OUTPATIENT)
Dept: FAMILY MEDICINE CLINIC | Age: 68
End: 2018-02-27

## 2018-02-27 DIAGNOSIS — M79.605 ACUTE PAIN OF LEFT LOWER EXTREMITY: Primary | ICD-10-CM

## 2018-02-27 RX ORDER — TRAMADOL HYDROCHLORIDE 50 MG/1
50 TABLET ORAL 2 TIMES DAILY PRN
Qty: 14 TABLET | Refills: 0 | Status: SHIPPED | OUTPATIENT
Start: 2018-02-27 | End: 2018-03-06

## 2018-03-20 ENCOUNTER — OFFICE VISIT (OUTPATIENT)
Dept: FAMILY MEDICINE CLINIC | Age: 68
End: 2018-03-20
Payer: MEDICARE

## 2018-03-20 VITALS
OXYGEN SATURATION: 96 % | WEIGHT: 207 LBS | HEART RATE: 98 BPM | DIASTOLIC BLOOD PRESSURE: 84 MMHG | RESPIRATION RATE: 20 BRPM | TEMPERATURE: 97 F | HEIGHT: 64 IN | SYSTOLIC BLOOD PRESSURE: 132 MMHG | BODY MASS INDEX: 35.34 KG/M2

## 2018-03-20 DIAGNOSIS — E79.0 ELEVATED URIC ACID IN BLOOD: ICD-10-CM

## 2018-03-20 DIAGNOSIS — I10 ESSENTIAL HYPERTENSION: Primary | ICD-10-CM

## 2018-03-20 PROCEDURE — G8482 FLU IMMUNIZE ORDER/ADMIN: HCPCS | Performed by: NURSE PRACTITIONER

## 2018-03-20 PROCEDURE — 1036F TOBACCO NON-USER: CPT | Performed by: NURSE PRACTITIONER

## 2018-03-20 PROCEDURE — G8417 CALC BMI ABV UP PARAM F/U: HCPCS | Performed by: NURSE PRACTITIONER

## 2018-03-20 PROCEDURE — 3017F COLORECTAL CA SCREEN DOC REV: CPT | Performed by: NURSE PRACTITIONER

## 2018-03-20 PROCEDURE — 1123F ACP DISCUSS/DSCN MKR DOCD: CPT | Performed by: NURSE PRACTITIONER

## 2018-03-20 PROCEDURE — 1090F PRES/ABSN URINE INCON ASSESS: CPT | Performed by: NURSE PRACTITIONER

## 2018-03-20 PROCEDURE — 3014F SCREEN MAMMO DOC REV: CPT | Performed by: NURSE PRACTITIONER

## 2018-03-20 PROCEDURE — G8427 DOCREV CUR MEDS BY ELIG CLIN: HCPCS | Performed by: NURSE PRACTITIONER

## 2018-03-20 PROCEDURE — 99213 OFFICE O/P EST LOW 20 MIN: CPT | Performed by: NURSE PRACTITIONER

## 2018-03-20 PROCEDURE — 4040F PNEUMOC VAC/ADMIN/RCVD: CPT | Performed by: NURSE PRACTITIONER

## 2018-03-20 PROCEDURE — G8399 PT W/DXA RESULTS DOCUMENT: HCPCS | Performed by: NURSE PRACTITIONER

## 2018-03-20 RX ORDER — LISINOPRIL AND HYDROCHLOROTHIAZIDE 20; 12.5 MG/1; MG/1
TABLET ORAL
Qty: 30 TABLET | Refills: 5 | Status: CANCELLED | OUTPATIENT
Start: 2018-03-20

## 2018-03-20 RX ORDER — COLCHICINE 0.6 MG/1
TABLET ORAL
Qty: 10 TABLET | Refills: 0 | Status: SHIPPED | OUTPATIENT
Start: 2018-03-20 | End: 2018-04-09

## 2018-03-20 ASSESSMENT — ENCOUNTER SYMPTOMS
SHORTNESS OF BREATH: 0
COUGH: 0

## 2018-03-20 NOTE — PROGRESS NOTES
[address] [place]  [phone]    Leo Menendez is a 79 y.o. female who presents today for her  medical conditions/complaints as noted below. Leo Menendez is c/o of Hypertension and 3 Month Follow-Up    HPI:     HPI     Hypertension: Patient here for follow-up of elevated blood pressure. She is exercising and is adherent to low salt diet. Blood pressure is well controlled at home. Cardiac symptoms none. Patient denies chest pain, chest pressure/discomfort, exertional chest pressure/discomfort, fatigue, irregular heart beat, lower extremity edema and palpitations. Cardiovascular risk factors: advanced age (older than 54 for men, 72 for women), hypertension and obesity (BMI >= 30 kg/m2). Use of agents associated with hypertension: none. History of target organ damage: none. She was going to the [facility] weight loss center. She was using the shakes and meals and she feels she was allergic to something in the food. She devveloped pain in the back of her head and shoulders, she stopped the food and it resolved, she returned and the pain returned. She is happy that at least she had a start. She is going to the gym as often as she can. She is still having leg pain. Finding of 7.2 uric acid in most recent lab. Nursing note reviewed and discussed with patient. Patient's medications, allergies, past medical, surgical, social and family histories were reviewed and updated as appropriate.     Current Outpatient Prescriptions on File Prior to Visit   Medication Sig Dispense Refill    calcium carbonate (OSCAL) 500 MG TABS tablet Take 500 mg by mouth daily      lisinopril-hydrochlorothiazide (PRINZIDE;ZESTORETIC) 20-12.5 MG per tablet take 1 tablet by mouth once daily 30 tablet 5    Calcium Carb-Cholecalciferol (CALCIUM-VITAMIN D) 600-400 MG-UNIT TABS Take 1 Dose by mouth daily 30 tablet 5    aspirin 81 MG EC tablet Take 1 tablet by mouth 2 times daily 84 tablet 0     No current facility-administered medications on file prior to visit. Past Medical History:   Diagnosis Date    Arthritis     osteoarthritis    Hypertension       Past Surgical History:   Procedure Laterality Date    CHOLECYSTECTOMY, LAPAROSCOPIC  2012    DILATION AND CURETTAGE OF UTERUS  1982    HIP ARTHROPLASTY Left     MD TOTAL HIP ARTHROPLASTY Left 11/14/2017    HIP TOTAL ARTHROPLASTY ANTERIOR APPROACH (MEDACTA, FASCIA ILIACA BLOCK VS. LUMBAR PLEXUS PRE-OP, SPINAL VS. GENERAL, MEDACTA TABLE, C-ARM) performed by Morgan Gaviria,  at [address] Bilateral     as child (lazy eye?? )    TUBAL LIGATION       Family History   Problem Relation Age of Onset    Heart Disease Mother     Diabetes Father     Diabetes Brother      Social History   Substance Use Topics    Smoking status: Never Smoker    Smokeless tobacco: Never Used    Alcohol use Yes      Comment: 1 glass per month      Allergies   Allergen Reactions    Morphine Other (See Comments)     Does not like how it makes her feel       Subjective:      Review of Systems   Constitutional: Negative for chills and fever. Respiratory: Negative for cough and shortness of breath. Cardiovascular: Negative for chest pain, palpitations and leg swelling. Musculoskeletal: Positive for myalgias (left lower leg). Other pertinent ROS in HPI  Objective:     /84 (Site: Left Arm, Position: Sitting, Cuff Size: Medium Adult)   Pulse 98   Temp 97 °F (36.1 °C) (Oral)   Resp 20   Ht 5' 4.02\" (1.626 m)   Wt 207 lb (93.9 kg)   SpO2 96%   BMI 35.51 kg/m²    Wt Readings from Last 3 Encounters:   03/20/18 207 lb (93.9 kg)   02/23/18 205 lb (93 kg)   02/22/18 206 lb 12.8 oz (93.8 kg)       Physical Exam   Constitutional: She is oriented to person, place, and time. She appears well-developed and well-nourished. No distress. HENT:   Head: Normocephalic and atraumatic.    Right Ear: External ear normal.   Left Ear: External ear normal.   Nose: Nose normal.   Mouth/Throat: Oropharynx is clear and moist.   Eyes: Conjunctivae and EOM are normal. Pupils are equal, round, and reactive to light. Neck: Trachea normal, normal range of motion and full passive range of motion without pain. No thyroid mass present. Cardiovascular: Normal rate, regular rhythm, S1 normal, S2 normal and normal heart sounds. Exam reveals no distant heart sounds and no friction rub. Pulmonary/Chest: Effort normal and breath sounds normal. No accessory muscle usage. No respiratory distress. Abdominal: Soft. Bowel sounds are normal. She exhibits no distension, no ascites and no mass. Musculoskeletal: Normal range of motion. Left lower leg: She exhibits tenderness. She exhibits no bony tenderness, no swelling (no erythema) and no edema. Pain free ROM     Lymphadenopathy:     She has no cervical adenopathy. Neurological: She is oriented to person, place, and time. Gait is normal.   Skin: Skin is warm and dry. No rash noted. She is not diaphoretic. Psychiatric: She has a normal mood and affect. Her behavior is normal. Judgment and thought content normal.       Assessment/PLAN     1. Essential hypertension  Continue med    2. Elevated uric acid in blood  Will see if this helps her leg pain- pt will notify office with results. - colchicine (COLCRYS) 0.6 MG tablet; Take 2 tabs immediately, then 1 tablet 1 hour later. Then one tablet a day for 2 more days. Dispense: 10 tablet; Refill: 0    fit testing given      RTO if symptoms worsen or fail to improve  Pt agreeable with plan      Patient given educational materials - see patient instructions. Discussed use, benefit, and side effects of prescribed medications. All patient questions answered. Pt voiced understanding. Reviewed health maintenance. Instructed to continue current medications, diet and exercise.     1.  Nannette Renae received counseling on the

## 2018-03-22 ENCOUNTER — TELEPHONE (OUTPATIENT)
Dept: FAMILY MEDICINE CLINIC | Age: 68
End: 2018-03-22

## 2018-03-22 NOTE — TELEPHONE ENCOUNTER
meds you rx'd the other day worked wonderfully and helped with a lot with all her aches and pains and the problem. Pt states now that she is weaning off of it the pains are coming back.   She was wondering if she shouldn't be on something longer term like alopurinol

## 2018-03-23 RX ORDER — ALLOPURINOL 100 MG/1
100 TABLET ORAL DAILY
Qty: 30 TABLET | Refills: 3 | Status: SHIPPED | OUTPATIENT
Start: 2018-03-23 | End: 2018-04-09

## 2018-04-03 ENCOUNTER — TELEPHONE (OUTPATIENT)
Dept: BARIATRICS/WEIGHT MGMT | Age: 68
End: 2018-04-03

## 2018-04-06 ENCOUNTER — TELEPHONE (OUTPATIENT)
Dept: FAMILY MEDICINE CLINIC | Age: 68
End: 2018-04-06

## 2018-04-09 RX ORDER — COLCHICINE 0.6 MG/1
0.6 TABLET ORAL DAILY
Qty: 30 TABLET | Refills: 3 | Status: SHIPPED | OUTPATIENT
Start: 2018-04-09 | End: 2018-04-11

## 2018-04-11 ENCOUNTER — OFFICE VISIT (OUTPATIENT)
Dept: FAMILY MEDICINE CLINIC | Age: 68
End: 2018-04-11
Payer: MEDICARE

## 2018-04-11 VITALS
RESPIRATION RATE: 30 BRPM | SYSTOLIC BLOOD PRESSURE: 134 MMHG | DIASTOLIC BLOOD PRESSURE: 82 MMHG | BODY MASS INDEX: 35.85 KG/M2 | WEIGHT: 210 LBS | OXYGEN SATURATION: 99 % | HEIGHT: 64 IN | HEART RATE: 81 BPM | TEMPERATURE: 97.2 F

## 2018-04-11 DIAGNOSIS — M1A.09X0 IDIOPATHIC CHRONIC GOUT OF MULTIPLE SITES WITHOUT TOPHUS: Primary | ICD-10-CM

## 2018-04-11 PROCEDURE — G8427 DOCREV CUR MEDS BY ELIG CLIN: HCPCS | Performed by: NURSE PRACTITIONER

## 2018-04-11 PROCEDURE — G8399 PT W/DXA RESULTS DOCUMENT: HCPCS | Performed by: NURSE PRACTITIONER

## 2018-04-11 PROCEDURE — 1036F TOBACCO NON-USER: CPT | Performed by: NURSE PRACTITIONER

## 2018-04-11 PROCEDURE — G8417 CALC BMI ABV UP PARAM F/U: HCPCS | Performed by: NURSE PRACTITIONER

## 2018-04-11 PROCEDURE — 3017F COLORECTAL CA SCREEN DOC REV: CPT | Performed by: NURSE PRACTITIONER

## 2018-04-11 PROCEDURE — 1090F PRES/ABSN URINE INCON ASSESS: CPT | Performed by: NURSE PRACTITIONER

## 2018-04-11 PROCEDURE — 1123F ACP DISCUSS/DSCN MKR DOCD: CPT | Performed by: NURSE PRACTITIONER

## 2018-04-11 PROCEDURE — 99213 OFFICE O/P EST LOW 20 MIN: CPT | Performed by: NURSE PRACTITIONER

## 2018-04-11 PROCEDURE — 3014F SCREEN MAMMO DOC REV: CPT | Performed by: NURSE PRACTITIONER

## 2018-04-11 PROCEDURE — 4040F PNEUMOC VAC/ADMIN/RCVD: CPT | Performed by: NURSE PRACTITIONER

## 2018-04-11 ASSESSMENT — ENCOUNTER SYMPTOMS
COUGH: 0
SHORTNESS OF BREATH: 0
DIARRHEA: 0
SINUS PRESSURE: 0
ABDOMINAL PAIN: 0
CONSTIPATION: 0
CHEST TIGHTNESS: 0
RHINORRHEA: 0
BLOOD IN STOOL: 0

## 2018-05-17 ENCOUNTER — OFFICE VISIT (OUTPATIENT)
Dept: FAMILY MEDICINE CLINIC | Age: 68
End: 2018-05-17
Payer: MEDICARE

## 2018-05-17 VITALS
DIASTOLIC BLOOD PRESSURE: 82 MMHG | HEART RATE: 88 BPM | TEMPERATURE: 97.3 F | WEIGHT: 212.6 LBS | BODY MASS INDEX: 36.47 KG/M2 | SYSTOLIC BLOOD PRESSURE: 130 MMHG | OXYGEN SATURATION: 96 %

## 2018-05-17 DIAGNOSIS — M10.9 ACUTE GOUT OF LEFT FOOT, UNSPECIFIED CAUSE: Primary | ICD-10-CM

## 2018-05-17 PROCEDURE — 99213 OFFICE O/P EST LOW 20 MIN: CPT | Performed by: NURSE PRACTITIONER

## 2018-05-17 PROCEDURE — 1036F TOBACCO NON-USER: CPT | Performed by: NURSE PRACTITIONER

## 2018-05-17 PROCEDURE — 1090F PRES/ABSN URINE INCON ASSESS: CPT | Performed by: NURSE PRACTITIONER

## 2018-05-17 PROCEDURE — G8417 CALC BMI ABV UP PARAM F/U: HCPCS | Performed by: NURSE PRACTITIONER

## 2018-05-17 PROCEDURE — G8427 DOCREV CUR MEDS BY ELIG CLIN: HCPCS | Performed by: NURSE PRACTITIONER

## 2018-05-17 PROCEDURE — 4040F PNEUMOC VAC/ADMIN/RCVD: CPT | Performed by: NURSE PRACTITIONER

## 2018-05-17 PROCEDURE — 1123F ACP DISCUSS/DSCN MKR DOCD: CPT | Performed by: NURSE PRACTITIONER

## 2018-05-17 PROCEDURE — 3017F COLORECTAL CA SCREEN DOC REV: CPT | Performed by: NURSE PRACTITIONER

## 2018-05-17 PROCEDURE — G8399 PT W/DXA RESULTS DOCUMENT: HCPCS | Performed by: NURSE PRACTITIONER

## 2018-05-17 RX ORDER — METHYLPREDNISOLONE 4 MG/1
TABLET ORAL
Qty: 1 KIT | Refills: 0 | Status: SHIPPED | OUTPATIENT
Start: 2018-05-17 | End: 2018-05-23

## 2018-05-17 RX ORDER — MELOXICAM 15 MG/1
15 TABLET ORAL DAILY
COMMUNITY
End: 2018-09-26

## 2018-05-17 ASSESSMENT — ENCOUNTER SYMPTOMS
SHORTNESS OF BREATH: 0
DIARRHEA: 0
COUGH: 0
SINUS PRESSURE: 0
CONSTIPATION: 0
BLOOD IN STOOL: 0
ABDOMINAL PAIN: 0
RHINORRHEA: 0
CHEST TIGHTNESS: 0

## 2018-06-06 ENCOUNTER — OFFICE VISIT (OUTPATIENT)
Dept: FAMILY MEDICINE CLINIC | Age: 68
End: 2018-06-06
Payer: MEDICARE

## 2018-06-06 VITALS
TEMPERATURE: 97 F | SYSTOLIC BLOOD PRESSURE: 118 MMHG | BODY MASS INDEX: 36.37 KG/M2 | RESPIRATION RATE: 20 BRPM | HEIGHT: 64 IN | DIASTOLIC BLOOD PRESSURE: 76 MMHG | HEART RATE: 89 BPM | WEIGHT: 213 LBS | OXYGEN SATURATION: 98 %

## 2018-06-06 DIAGNOSIS — R60.0 LOCALIZED EDEMA: Primary | ICD-10-CM

## 2018-06-06 PROCEDURE — G8417 CALC BMI ABV UP PARAM F/U: HCPCS | Performed by: NURSE PRACTITIONER

## 2018-06-06 PROCEDURE — 99213 OFFICE O/P EST LOW 20 MIN: CPT | Performed by: NURSE PRACTITIONER

## 2018-06-06 PROCEDURE — 1101F PT FALLS ASSESS-DOCD LE1/YR: CPT | Performed by: NURSE PRACTITIONER

## 2018-06-06 PROCEDURE — 3017F COLORECTAL CA SCREEN DOC REV: CPT | Performed by: NURSE PRACTITIONER

## 2018-06-06 PROCEDURE — 1123F ACP DISCUSS/DSCN MKR DOCD: CPT | Performed by: NURSE PRACTITIONER

## 2018-06-06 PROCEDURE — 1036F TOBACCO NON-USER: CPT | Performed by: NURSE PRACTITIONER

## 2018-06-06 PROCEDURE — 4040F PNEUMOC VAC/ADMIN/RCVD: CPT | Performed by: NURSE PRACTITIONER

## 2018-06-06 PROCEDURE — G8427 DOCREV CUR MEDS BY ELIG CLIN: HCPCS | Performed by: NURSE PRACTITIONER

## 2018-06-06 PROCEDURE — G8399 PT W/DXA RESULTS DOCUMENT: HCPCS | Performed by: NURSE PRACTITIONER

## 2018-06-06 PROCEDURE — 1090F PRES/ABSN URINE INCON ASSESS: CPT | Performed by: NURSE PRACTITIONER

## 2018-06-06 RX ORDER — MULTIVITAMIN WITH IRON
100 TABLET ORAL DAILY
COMMUNITY
End: 2018-09-26

## 2018-06-06 ASSESSMENT — ENCOUNTER SYMPTOMS
SHORTNESS OF BREATH: 0
COUGH: 0

## 2018-06-06 NOTE — PROGRESS NOTES
Visit Information    Have you changed or started any medications since your last visit including any over-the-counter medicines, vitamins, or herbal medicines? no   Have you stopped taking any of your medications? Is so, why? -  no  Are you having any side effects from any of your medications? - no    Have you seen any other physician or provider since your last visit?  no   Have you had any other diagnostic tests since your last visit?  no   Have you been seen in the emergency room and/or had an admission in a hospital since we last saw you?  no   Have you had your routine dental cleaning in the past 6 months?  no     Do you have an active MyChart account? If no, what is the barrier?   Yes    Patient Care Team:  ZA Lunsford CNP as PCP - General (Certified Nurse Practitioner)  ZA Lunsford CNP as Referring Physician (Certified Nurse Practitioner)    Medical History Review  Past Medical, Family, and Social History reviewed and does not contribute to the patient presenting condition    Health Maintenance   Topic Date Due    Colon Cancer Screen FIT/FOBT  03/24/2018    Shingles Vaccine (1 of 2 - 2 Dose Series) 12/01/2019 (Originally 3/15/2002)    Breast cancer screen  08/03/2018    Pneumococcal low/med risk (2 of 2 - PPSV23) 08/21/2018    A1C test (Diabetic or Prediabetic)  08/31/2018    Potassium monitoring  02/01/2019    Creatinine monitoring  02/01/2019    Lipid screen  02/01/2023    DTaP/Tdap/Td vaccine (2 - Td) 08/21/2027    DEXA (modify frequency per FRAX score)  Completed    Flu vaccine  Completed    Hepatitis C screen  Completed

## 2018-06-06 NOTE — PROGRESS NOTES
[address] [facility] [address], [address] [place]  [phone]    Vidal Ramírez is a 79 y.o. female who presents today for her  medical conditions/complaints as noted below. Johnathon Chino MaricruzrMcleary is c/o of Leg Swelling (left x 5 days on and off) and Arm Swelling (left x 5 days on and off when it was humid)    HPI:     HPI   Vira Chavarria is here for edema. Pt noticed that she had left arm and left leg swelling from the foot to above the knee  The swelling is better now that the weather is less humid. No cp or sob. No jaw pain. The arm was sore but she feels it was related to her stretched skin. The arm swelling has been going on for a few years. The first time that she noticed this was a few years ago after she got off a flight. There is never any redness or warmth. The leg swelling is new this year but is on the same side that she had her hip replaced on. No cp or sob. She is right handed. Weight is steady. Nursing note reviewed and discussed with patient. Patient's medications, allergies, past medical, surgical, social and family histories were reviewed and updated as appropriate. Current Outpatient Prescriptions on File Prior to Visit   Medication Sig Dispense Refill    meloxicam (MOBIC) 15 MG tablet Take 15 mg by mouth daily      lipase-protease-amylase (CREON) 48268 units delayed release capsule Take 1 capsule by mouth daily 90 capsule 1    Calcium Carb-Cholecalciferol (CALCIUM-VITAMIN D) 600-400 MG-UNIT TABS Take 1 Dose by mouth daily 30 tablet 5    aspirin 81 MG EC tablet Take 1 tablet by mouth 2 times daily 84 tablet 0     No current facility-administered medications on file prior to visit.       Past Medical History:   Diagnosis Date    Arthritis     osteoarthritis    Hypertension       Past Surgical History:   Procedure Laterality Date    CHOLECYSTECTOMY, LAPAROSCOPIC  2012    DILATION AND CURETTAGE OF UTERUS  1982    HIP ARTHROPLASTY Left     [facility] TOTAL HIP ARTHROPLASTY Left 11/14/2017    HIP TOTAL ARTHROPLASTY ANTERIOR APPROACH (MEDACTA, FASCIA ILIACA BLOCK VS. LUMBAR PLEXUS PRE-OP, SPINAL VS. GENERAL, MEDACTA TABLE, C-ARM) performed by Camryn Gallardo DO at [address] [place] Bilateral     as child (lazy eye?? )    TUBAL LIGATION       Family History   Problem Relation Age of Onset    Heart Disease Mother     Diabetes Father     Diabetes Brother      Social History   Substance Use Topics    Smoking status: Never Smoker    Smokeless tobacco: Never Used    Alcohol use Yes      Comment: 1 glass per month      Allergies   Allergen Reactions    Allopurinol     Morphine Other (See Comments)     Does not like how it makes her feel       Subjective:      Review of Systems   Constitutional: Negative for chills and fever. Respiratory: Negative for cough and shortness of breath. Cardiovascular: Positive for leg swelling (left only). Negative for chest pain and palpitations. Neurological: Negative for light-headedness and headaches. Hematological: Negative for adenopathy. Other pertinent ROS in HPI  Objective:     /76 (Site: Right Arm, Position: Sitting, Cuff Size: Medium Adult)   Pulse 89   Temp 97 °F (36.1 °C) (Oral)   Resp 20   Ht 5' 4.02\" (1.626 m)   Wt 213 lb (96.6 kg)   SpO2 98%   BMI 36.54 kg/m²      Wt Readings from Last 3 Encounters:   06/06/18 213 lb (96.6 kg)   05/17/18 212 lb 9.6 oz (96.4 kg)   04/11/18 210 lb (95.3 kg)       Physical Exam   Constitutional: She is oriented to person, place, and time. She appears well-developed and well-nourished. No distress. HENT:   Head: Normocephalic and atraumatic. Right Ear: External ear normal.   Left Ear: External ear normal.   Nose: Nose normal.   Mouth/Throat: Oropharynx is clear and moist.   Eyes: Conjunctivae and EOM are normal. Pupils are equal, round, and reactive to light.    Neck: Trachea normal, normal range of motion and full passive range of motion without pain. No thyroid mass present. Cardiovascular: Normal rate, regular rhythm, S1 normal, S2 normal and normal heart sounds. Exam reveals no distant heart sounds and no friction rub. Pulmonary/Chest: Effort normal and breath sounds normal. No accessory muscle usage. No respiratory distress. Abdominal: Soft. Bowel sounds are normal. She exhibits no distension, no ascites and no mass. Musculoskeletal: Normal range of motion. Pain free ROM  Mild edema remains in left arm. Upper arm circumference on left 15.5 on right 15. No warmth or redness. No lymphadenopathy. Lymphadenopathy:     She has no cervical adenopathy. Right cervical: No superficial cervical, no deep cervical and no posterior cervical adenopathy present. Left cervical: No superficial cervical, no deep cervical and no posterior cervical adenopathy present. She has no axillary adenopathy. Neurological: She is oriented to person, place, and time. Gait is normal.   Skin: Skin is warm and dry. No rash noted. She is not diaphoretic. Psychiatric: She has a normal mood and affect. Her behavior is normal. Judgment and thought content normal.   no swelling to either leg. Assessment/PLAN     1. Localized edema  *do not feel cardiac or vascular in nature  Left leg has had intermittent swelling since hip replacement and arm has been swelling off and on for years. Goes away with mobilization and change in weather. Suggest that she get and wear compression stockings prn. Drink plenty of water, low sodium. exercise      RTO if symptoms worsen or fail to improve  Pt agreeable with plan      Patient given educational materials - see patient instructions. Discussed use, benefit, and side effects of prescribed medications. All patient questions answered. Pt voiced understanding. Reviewed health maintenance. Instructed to continue current medications, diet and exercise.     1.  Harjeet [place] received counseling on the following healthy behaviors: nutrition, exercise and medication adherence  2. Patient given educational materials when available - see patient instructions when applicable  3. Discussed use, benefit, and side effects of prescribed medications. Barriers to medication compliance addressed. All patient questions answered. Pt voiced understanding. 4.  Reviewed prior labs and health maintenance  5. Continue current medications, diet and exercise.     Completed Refills   Requested Prescriptions      No prescriptions requested or ordered in this encounter         Electronically signed by Eusebia Weinstein CNP on 7/12/2018 at 1:39 PM

## 2018-06-12 DIAGNOSIS — I10 ESSENTIAL HYPERTENSION: ICD-10-CM

## 2018-06-13 RX ORDER — LISINOPRIL AND HYDROCHLOROTHIAZIDE 20; 12.5 MG/1; MG/1
TABLET ORAL
Qty: 30 TABLET | Refills: 5 | Status: SHIPPED | OUTPATIENT
Start: 2018-06-13 | End: 2018-12-10 | Stop reason: SDUPTHER

## 2018-07-31 DIAGNOSIS — Z12.12 SCREENING FOR COLORECTAL CANCER: ICD-10-CM

## 2018-07-31 DIAGNOSIS — Z12.11 SCREENING FOR COLORECTAL CANCER: Primary | ICD-10-CM

## 2018-07-31 DIAGNOSIS — Z12.11 SCREENING FOR COLORECTAL CANCER: ICD-10-CM

## 2018-07-31 DIAGNOSIS — Z12.12 SCREENING FOR COLORECTAL CANCER: Primary | ICD-10-CM

## 2018-07-31 LAB
CONTROL: PRESENT
HEMOCCULT STL QL: NEGATIVE

## 2018-07-31 PROCEDURE — 82274 ASSAY TEST FOR BLOOD FECAL: CPT | Performed by: NURSE PRACTITIONER

## 2018-09-26 ENCOUNTER — OFFICE VISIT (OUTPATIENT)
Dept: FAMILY MEDICINE CLINIC | Age: 68
End: 2018-09-26
Payer: MEDICARE

## 2018-09-26 VITALS
BODY MASS INDEX: 37.44 KG/M2 | HEART RATE: 98 BPM | SYSTOLIC BLOOD PRESSURE: 112 MMHG | TEMPERATURE: 97 F | DIASTOLIC BLOOD PRESSURE: 80 MMHG | OXYGEN SATURATION: 99 % | WEIGHT: 218.2 LBS

## 2018-09-26 DIAGNOSIS — Z23 NEED FOR INFLUENZA VACCINATION: ICD-10-CM

## 2018-09-26 DIAGNOSIS — Z12.39 SCREENING FOR BREAST CANCER: ICD-10-CM

## 2018-09-26 DIAGNOSIS — Z23 NEED FOR PNEUMOCOCCAL VACCINATION: ICD-10-CM

## 2018-09-26 DIAGNOSIS — H65.02 ACUTE SEROUS OTITIS MEDIA OF LEFT EAR, RECURRENCE NOT SPECIFIED: Primary | ICD-10-CM

## 2018-09-26 PROCEDURE — 1090F PRES/ABSN URINE INCON ASSESS: CPT | Performed by: NURSE PRACTITIONER

## 2018-09-26 PROCEDURE — G0009 ADMIN PNEUMOCOCCAL VACCINE: HCPCS | Performed by: NURSE PRACTITIONER

## 2018-09-26 PROCEDURE — 90662 IIV NO PRSV INCREASED AG IM: CPT | Performed by: NURSE PRACTITIONER

## 2018-09-26 PROCEDURE — G8427 DOCREV CUR MEDS BY ELIG CLIN: HCPCS | Performed by: NURSE PRACTITIONER

## 2018-09-26 PROCEDURE — 3017F COLORECTAL CA SCREEN DOC REV: CPT | Performed by: NURSE PRACTITIONER

## 2018-09-26 PROCEDURE — G8417 CALC BMI ABV UP PARAM F/U: HCPCS | Performed by: NURSE PRACTITIONER

## 2018-09-26 PROCEDURE — 4040F PNEUMOC VAC/ADMIN/RCVD: CPT | Performed by: NURSE PRACTITIONER

## 2018-09-26 PROCEDURE — 1036F TOBACCO NON-USER: CPT | Performed by: NURSE PRACTITIONER

## 2018-09-26 PROCEDURE — G0008 ADMIN INFLUENZA VIRUS VAC: HCPCS | Performed by: NURSE PRACTITIONER

## 2018-09-26 PROCEDURE — 90732 PPSV23 VACC 2 YRS+ SUBQ/IM: CPT | Performed by: NURSE PRACTITIONER

## 2018-09-26 PROCEDURE — 1101F PT FALLS ASSESS-DOCD LE1/YR: CPT | Performed by: NURSE PRACTITIONER

## 2018-09-26 PROCEDURE — 1123F ACP DISCUSS/DSCN MKR DOCD: CPT | Performed by: NURSE PRACTITIONER

## 2018-09-26 PROCEDURE — 99213 OFFICE O/P EST LOW 20 MIN: CPT | Performed by: NURSE PRACTITIONER

## 2018-09-26 PROCEDURE — G8399 PT W/DXA RESULTS DOCUMENT: HCPCS | Performed by: NURSE PRACTITIONER

## 2018-09-26 RX ORDER — AMOXICILLIN 875 MG/1
875 TABLET, COATED ORAL 2 TIMES DAILY
Qty: 20 TABLET | Refills: 0 | Status: SHIPPED | OUTPATIENT
Start: 2018-09-26 | End: 2018-10-12 | Stop reason: SDUPTHER

## 2018-09-26 ASSESSMENT — PATIENT HEALTH QUESTIONNAIRE - PHQ9
1. LITTLE INTEREST OR PLEASURE IN DOING THINGS: 0
SUM OF ALL RESPONSES TO PHQ9 QUESTIONS 1 & 2: 0
SUM OF ALL RESPONSES TO PHQ QUESTIONS 1-9: 0
SUM OF ALL RESPONSES TO PHQ QUESTIONS 1-9: 0
2. FEELING DOWN, DEPRESSED OR HOPELESS: 0

## 2018-09-26 ASSESSMENT — ENCOUNTER SYMPTOMS: RHINORRHEA: 0

## 2018-09-26 NOTE — PROGRESS NOTES
Patient is present complaining of b/l ear pain x3 weeks  Patient states it feels like her ears are swollen in side  Patient has been taking OTC allergy medication with some relief    Pharmacy and medication reviewed with patient    Visit Information    Have you changed or started any medications since your last visit including any over-the-counter medicines, vitamins, or herbal medicines? no   Have you stopped taking any of your medications? Is so, why? -  no  Are you having any side effects from any of your medications? - no    Have you seen any other physician or provider since your last visit?  no   Have you had any other diagnostic tests since your last visit?  no   Have you been seen in the emergency room and/or had an admission in a hospital since we last saw you?  no   Have you had your routine dental cleaning in the past 6 months?  no     Do you have an active MyChart account? If no, what is the barrier?   Yes    Patient Care Team:  ZA Raygoza CNP as PCP - General (Certified Nurse Practitioner)  ZA Raygoza CNP as Referring Physician (Certified Nurse Practitioner)    Medical History Review  Past Medical, Family, and Social History reviewed and does contribute to the patient presenting condition    Health Maintenance   Topic Date Due    Breast cancer screen  08/03/2018    Pneumococcal low/med risk (2 of 2 - PPSV23) 08/21/2018    A1C test (Diabetic or Prediabetic)  08/31/2018    Flu vaccine (1) 09/01/2018    Shingles Vaccine (1 of 2 - 2 Dose Series) 12/01/2019 (Originally 3/15/2002)    Potassium monitoring  02/01/2019    Creatinine monitoring  02/01/2019    Colon Cancer Screen FIT/FOBT  07/31/2019    Lipid screen  02/01/2023    DTaP/Tdap/Td vaccine (2 - Td) 08/21/2027    DEXA (modify frequency per FRAX score)  Completed    Hepatitis C screen  Completed         Vaccine Information Sheet, \"Influenza - Inactivated\"  given to [address] [place] [address], or parent/legal guardian of  Sunita Ny and verbalized understanding. Patient responses:    Have you ever had a reaction to a flu vaccine? No  Are you able to eat eggs without adverse effects? No  Do you have any current illness? No  Have you ever had [place] Syndrome? No    Flu vaccine given per order. Please see immunization tab.

## 2018-10-08 ENCOUNTER — TELEPHONE (OUTPATIENT)
Dept: FAMILY MEDICINE CLINIC | Age: 68
End: 2018-10-08

## 2018-10-12 DIAGNOSIS — H65.02 ACUTE SEROUS OTITIS MEDIA OF LEFT EAR, RECURRENCE NOT SPECIFIED: ICD-10-CM

## 2018-10-12 RX ORDER — AMOXICILLIN 875 MG/1
875 TABLET, COATED ORAL 2 TIMES DAILY
Qty: 20 TABLET | Refills: 0 | Status: SHIPPED | OUTPATIENT
Start: 2018-10-12 | End: 2018-10-22

## 2018-10-17 ENCOUNTER — HOSPITAL ENCOUNTER (OUTPATIENT)
Dept: MAMMOGRAPHY | Age: 68
Discharge: HOME | End: 2018-10-19
Payer: MEDICARE

## 2018-10-17 DIAGNOSIS — Z12.39 SCREENING FOR BREAST CANCER: ICD-10-CM

## 2018-10-17 PROCEDURE — 77063 BREAST TOMOSYNTHESIS BI: CPT

## 2018-10-24 ENCOUNTER — HOSPITAL ENCOUNTER (OUTPATIENT)
Dept: MAMMOGRAPHY | Age: 68
Discharge: HOME | End: 2018-10-26
Payer: MEDICARE

## 2018-10-24 DIAGNOSIS — R92.8 ABNORMAL MAMMOGRAM: ICD-10-CM

## 2018-10-24 PROCEDURE — G0279 TOMOSYNTHESIS, MAMMO: HCPCS

## 2018-11-13 ASSESSMENT — PROMIS GLOBAL HEALTH SCALE
IN GENERAL, WOULD YOU SAY YOUR HEALTH IS...[ON A SCALE OF 1 (POOR) TO 5 (EXCELLENT)]: 4
IN THE PAST 7 DAYS, HOW WOULD YOU RATE YOUR PAIN ON AVERAGE [ON A SCALE FROM 0 (NO PAIN) TO 10 (WORST IMAGINABLE PAIN)]?: 1
IN THE PAST 7 DAYS, HOW OFTEN HAVE YOU BEEN BOTHERED BY EMOTIONAL PROBLEMS, SUCH AS FEELING ANXIOUS, DEPRESSED, OR IRRITABLE [ON A SCALE FROM 1 (NEVER) TO 5 (ALWAYS)]?: 2
IN GENERAL, HOW WOULD YOU RATE YOUR SATISFACTION WITH YOUR SOCIAL ACTIVITIES AND RELATIONSHIPS [ON A SCALE OF 1 (POOR) TO 5 (EXCELLENT)]?: 3
WHO IS THE PERSON COMPLETING THE PROMIS V1.1 SURVEY?: 0
IN GENERAL, PLEASE RATE HOW WELL YOU CARRY OUT YOUR USUAL SOCIAL ACTIVITIES (INCLUDES ACTIVITIES AT HOME, AT WORK, AND IN YOUR COMMUNITY, AND RESPONSIBILITIES AS A PARENT, CHILD, SPOUSE, EMPLOYEE, FRIEND, ETC) [ON A SCALE OF 1 (POOR) TO 5 (EXCELLENT)]?: 3
HOW IS THE PROMIS V1.1 BEING ADMINISTERED?: 2
TO WHAT EXTENT ARE YOU ABLE TO CARRY OUT YOUR EVERYDAY PHYSICAL ACTIVITIES SUCH AS WALKING, CLIMBING STAIRS, CARRYING GROCERIES, OR MOVING A CHAIR [ON A SCALE OF 1 (NOT AT ALL) TO 5 (COMPLETELY)]?: 4
IN THE PAST 7 DAYS, HOW WOULD YOU RATE YOUR FATIGUE ON AVERAGE [ON A SCALE FROM 1 (NONE) TO 5 (VERY SEVERE)]?: 4
IN GENERAL, WOULD YOU SAY YOUR QUALITY OF LIFE IS...[ON A SCALE OF 1 (POOR) TO 5 (EXCELLENT)]: 4
IN GENERAL, HOW WOULD YOU RATE YOUR MENTAL HEALTH, INCLUDING YOUR MOOD AND YOUR ABILITY TO THINK [ON A SCALE OF 1 (POOR) TO 5 (EXCELLENT)]?: 4
IN GENERAL, HOW WOULD YOU RATE YOUR PHYSICAL HEALTH [ON A SCALE OF 1 (POOR) TO 5 (EXCELLENT)]?: 3

## 2018-11-13 ASSESSMENT — HOOS JR
LYING IN BED (TURNING OVER, MAINTAINING HIP POSITION): 0
BENDING TO THE FLOOR TO PICK UP OBJECT: 0
GOING UP OR DOWN STAIRS: 0
WALKING ON UNEVEN SURFACE: 0
SITTING: 0
RISING FROM SITTING: 0

## 2018-12-10 DIAGNOSIS — I10 ESSENTIAL HYPERTENSION: ICD-10-CM

## 2018-12-11 RX ORDER — LISINOPRIL AND HYDROCHLOROTHIAZIDE 20; 12.5 MG/1; MG/1
TABLET ORAL
Qty: 30 TABLET | Refills: 5 | Status: SHIPPED | OUTPATIENT
Start: 2018-12-11 | End: 2019-06-03 | Stop reason: SDUPTHER

## 2019-01-29 ENCOUNTER — TELEPHONE (OUTPATIENT)
Dept: FAMILY MEDICINE CLINIC | Age: 69
End: 2019-01-29

## 2019-02-11 ENCOUNTER — OFFICE VISIT (OUTPATIENT)
Dept: FAMILY MEDICINE CLINIC | Age: 69
End: 2019-02-11
Payer: MEDICARE

## 2019-02-11 VITALS
OXYGEN SATURATION: 97 % | SYSTOLIC BLOOD PRESSURE: 108 MMHG | HEART RATE: 70 BPM | TEMPERATURE: 97.7 F | DIASTOLIC BLOOD PRESSURE: 70 MMHG | BODY MASS INDEX: 36.06 KG/M2 | WEIGHT: 210.2 LBS

## 2019-02-11 DIAGNOSIS — Z13.220 SCREENING, LIPID: ICD-10-CM

## 2019-02-11 DIAGNOSIS — Z79.899 MEDICATION MANAGEMENT: ICD-10-CM

## 2019-02-11 DIAGNOSIS — R73.9 HYPERGLYCEMIA: Primary | ICD-10-CM

## 2019-02-11 LAB — HBA1C MFR BLD: 5.9 %

## 2019-02-11 PROCEDURE — 1101F PT FALLS ASSESS-DOCD LE1/YR: CPT | Performed by: NURSE PRACTITIONER

## 2019-02-11 PROCEDURE — G8399 PT W/DXA RESULTS DOCUMENT: HCPCS | Performed by: NURSE PRACTITIONER

## 2019-02-11 PROCEDURE — 99213 OFFICE O/P EST LOW 20 MIN: CPT | Performed by: NURSE PRACTITIONER

## 2019-02-11 PROCEDURE — 1123F ACP DISCUSS/DSCN MKR DOCD: CPT | Performed by: NURSE PRACTITIONER

## 2019-02-11 PROCEDURE — G8417 CALC BMI ABV UP PARAM F/U: HCPCS | Performed by: NURSE PRACTITIONER

## 2019-02-11 PROCEDURE — G8482 FLU IMMUNIZE ORDER/ADMIN: HCPCS | Performed by: NURSE PRACTITIONER

## 2019-02-11 PROCEDURE — 4040F PNEUMOC VAC/ADMIN/RCVD: CPT | Performed by: NURSE PRACTITIONER

## 2019-02-11 PROCEDURE — 1090F PRES/ABSN URINE INCON ASSESS: CPT | Performed by: NURSE PRACTITIONER

## 2019-02-11 PROCEDURE — 1036F TOBACCO NON-USER: CPT | Performed by: NURSE PRACTITIONER

## 2019-02-11 PROCEDURE — G8427 DOCREV CUR MEDS BY ELIG CLIN: HCPCS | Performed by: NURSE PRACTITIONER

## 2019-02-11 PROCEDURE — 83036 HEMOGLOBIN GLYCOSYLATED A1C: CPT | Performed by: NURSE PRACTITIONER

## 2019-02-11 PROCEDURE — 3017F COLORECTAL CA SCREEN DOC REV: CPT | Performed by: NURSE PRACTITIONER

## 2019-02-11 ASSESSMENT — ENCOUNTER SYMPTOMS
COUGH: 0
SHORTNESS OF BREATH: 0

## 2019-02-11 ASSESSMENT — PATIENT HEALTH QUESTIONNAIRE - PHQ9
SUM OF ALL RESPONSES TO PHQ9 QUESTIONS 1 & 2: 0
1. LITTLE INTEREST OR PLEASURE IN DOING THINGS: 0
SUM OF ALL RESPONSES TO PHQ QUESTIONS 1-9: 0
SUM OF ALL RESPONSES TO PHQ QUESTIONS 1-9: 0
2. FEELING DOWN, DEPRESSED OR HOPELESS: 0

## 2019-02-22 ENCOUNTER — HOSPITAL ENCOUNTER (OUTPATIENT)
Dept: ULTRASOUND IMAGING | Age: 69
Discharge: HOME | End: 2019-02-24
Payer: MEDICARE

## 2019-02-22 ENCOUNTER — HOSPITAL ENCOUNTER (OUTPATIENT)
Age: 69
Setting detail: SPECIMEN
Discharge: HOME | End: 2019-02-22
Payer: MEDICARE

## 2019-02-22 DIAGNOSIS — Z79.899 MEDICATION MANAGEMENT: ICD-10-CM

## 2019-02-22 DIAGNOSIS — R73.9 HYPERGLYCEMIA: ICD-10-CM

## 2019-02-22 DIAGNOSIS — R10.84 GENERALIZED ABDOMINAL PAIN: ICD-10-CM

## 2019-02-22 DIAGNOSIS — Z13.220 SCREENING, LIPID: ICD-10-CM

## 2019-02-22 LAB
ALBUMIN SERPL-MCNC: 4.3 G/DL (ref 3.5–5.2)
ALBUMIN SERPL-MCNC: 4.3 G/DL (ref 3.5–5.2)
ALBUMIN/GLOBULIN RATIO: 1.3 (ref 1–2.5)
ALBUMIN/GLOBULIN RATIO: 1.3 (ref 1–2.5)
ALP BLD-CCNC: 103 U/L (ref 35–104)
ALP BLD-CCNC: 103 U/L (ref 35–104)
ALT SERPL-CCNC: 32 U/L (ref 5–33)
ALT SERPL-CCNC: 32 U/L (ref 5–33)
AMYLASE: 71 U/L (ref 28–100)
ANION GAP SERPL CALCULATED.3IONS-SCNC: 15 MMOL/L (ref 9–17)
AST SERPL-CCNC: 28 U/L
AST SERPL-CCNC: 28 U/L
BILIRUB SERPL-MCNC: 0.37 MG/DL (ref 0.3–1.2)
BILIRUB SERPL-MCNC: 0.37 MG/DL (ref 0.3–1.2)
BILIRUBIN DIRECT: 0.09 MG/DL
BILIRUBIN, INDIRECT: 0.28 MG/DL (ref 0–1)
BUN BLDV-MCNC: 29 MG/DL (ref 8–23)
BUN/CREAT BLD: ABNORMAL (ref 9–20)
CALCIUM SERPL-MCNC: 10 MG/DL (ref 8.6–10.4)
CHLORIDE BLD-SCNC: 99 MMOL/L (ref 98–107)
CHOLESTEROL/HDL RATIO: 2.4
CHOLESTEROL: 144 MG/DL
CO2: 24 MMOL/L (ref 20–31)
CREAT SERPL-MCNC: 0.9 MG/DL (ref 0.5–0.9)
GFR AFRICAN AMERICAN: >60 ML/MIN
GFR NON-AFRICAN AMERICAN: >60 ML/MIN
GFR SERPL CREATININE-BSD FRML MDRD: ABNORMAL ML/MIN/{1.73_M2}
GFR SERPL CREATININE-BSD FRML MDRD: ABNORMAL ML/MIN/{1.73_M2}
GLOBULIN: NORMAL G/DL (ref 1.5–3.8)
GLUCOSE BLD-MCNC: 90 MG/DL (ref 70–99)
HCT VFR BLD CALC: 43.5 % (ref 36.3–47.1)
HDLC SERPL-MCNC: 59 MG/DL
HEMOGLOBIN: 14 G/DL (ref 11.9–15.1)
LDL CHOLESTEROL: 56 MG/DL (ref 0–130)
LIPASE: 40 U/L (ref 13–60)
MCH RBC QN AUTO: 28.3 PG (ref 25.2–33.5)
MCHC RBC AUTO-ENTMCNC: 32.2 G/DL (ref 28.4–34.8)
MCV RBC AUTO: 88.1 FL (ref 82.6–102.9)
NRBC AUTOMATED: 0 PER 100 WBC
PDW BLD-RTO: 14.8 % (ref 11.8–14.4)
PLATELET # BLD: 263 K/UL (ref 138–453)
PMV BLD AUTO: 12.4 FL (ref 8.1–13.5)
POTASSIUM SERPL-SCNC: 4.2 MMOL/L (ref 3.7–5.3)
RBC # BLD: 4.94 M/UL (ref 3.95–5.11)
SODIUM BLD-SCNC: 138 MMOL/L (ref 135–144)
TOTAL PROTEIN: 7.6 G/DL (ref 6.4–8.3)
TOTAL PROTEIN: 7.6 G/DL (ref 6.4–8.3)
TRIGL SERPL-MCNC: 146 MG/DL
VLDLC SERPL CALC-MCNC: NORMAL MG/DL (ref 1–30)
WBC # BLD: 7.3 K/UL (ref 3.5–11.3)

## 2019-02-22 PROCEDURE — 76705 ECHO EXAM OF ABDOMEN: CPT

## 2019-02-25 ENCOUNTER — TELEPHONE (OUTPATIENT)
Dept: FAMILY MEDICINE CLINIC | Age: 69
End: 2019-02-25

## 2019-02-28 ENCOUNTER — HOSPITAL ENCOUNTER (OUTPATIENT)
Age: 69
Setting detail: SPECIMEN
Discharge: HOME | End: 2019-02-28
Payer: MEDICARE

## 2019-03-04 LAB — SURGICAL PATHOLOGY REPORT: NORMAL

## 2019-06-03 DIAGNOSIS — I10 ESSENTIAL HYPERTENSION: ICD-10-CM

## 2019-06-03 NOTE — TELEPHONE ENCOUNTER
Last visit: 2/11/19  Last Med refill: 12/11/18  Does patient have enough medication for 72 hours: Yes    Next Visit Date:  Future Appointments   Date Time Provider Jeovanny Olivo   8/12/2019  8:00 AM ZA Irizarry CNP [facility] [address] Maintenance   Topic Date Due    Shingles Vaccine (1 of 2) 12/01/2019 (Originally 3/15/2002)    A1C test (Diabetic or Prediabetic)  02/11/2020    Potassium monitoring  02/22/2020    Creatinine monitoring  02/22/2020    Breast cancer screen  10/24/2020    Lipid screen  02/22/2024    DTaP/Tdap/Td vaccine (2 - Td) 08/21/2027    Colon cancer screen colonoscopy  02/28/2029    DEXA (modify frequency per FRAX score)  Completed    Flu vaccine  Completed    Pneumococcal 65+ years Vaccine  Completed    Hepatitis C screen  Completed       Hemoglobin A1C (%)   Date Value   02/11/2019 5.9   08/31/2017 5.7             ( goal A1C is < 7)   No results found for: LABMICR  LDL Cholesterol (mg/dL)   Date Value   02/22/2019 56   02/01/2018 75       (goal LDL is <100)   AST (U/L)   Date Value   02/22/2019 28   02/22/2019 28     ALT (U/L)   Date Value   02/22/2019 32   02/22/2019 32     BUN (mg/dL)   Date Value   02/22/2019 29 (H)     BP Readings from Last 3 Encounters:   02/11/19 108/70   09/26/18 112/80   06/06/18 118/76          (goal 120/80)    All Future Testing planned in [facility]  Lab Frequency Next Occurrence               Patient Active Problem List:     Essential hypertension     Osteopenia     Status post total replacement of left hip     Obesity (BMI 30-39. 9)     Hyperglycemia     High triglycerides

## 2019-06-04 RX ORDER — LISINOPRIL AND HYDROCHLOROTHIAZIDE 20; 12.5 MG/1; MG/1
TABLET ORAL
Qty: 90 TABLET | Refills: 1 | Status: SHIPPED | OUTPATIENT
Start: 2019-06-04 | End: 2019-11-27 | Stop reason: SDUPTHER

## 2019-07-29 ENCOUNTER — TELEPHONE (OUTPATIENT)
Dept: FAMILY MEDICINE CLINIC | Age: 69
End: 2019-07-29

## 2019-09-09 ENCOUNTER — OFFICE VISIT (OUTPATIENT)
Dept: FAMILY MEDICINE CLINIC | Age: 69
End: 2019-09-09
Payer: MEDICARE

## 2019-09-09 VITALS
HEART RATE: 86 BPM | SYSTOLIC BLOOD PRESSURE: 124 MMHG | OXYGEN SATURATION: 97 % | DIASTOLIC BLOOD PRESSURE: 82 MMHG | WEIGHT: 216 LBS | BODY MASS INDEX: 36.88 KG/M2 | HEIGHT: 64 IN | TEMPERATURE: 97.7 F

## 2019-09-09 DIAGNOSIS — I10 ESSENTIAL HYPERTENSION: Primary | ICD-10-CM

## 2019-09-09 DIAGNOSIS — M79.605 LEG PAIN, ANTERIOR, LEFT: ICD-10-CM

## 2019-09-09 DIAGNOSIS — Z12.31 SCREENING MAMMOGRAM, ENCOUNTER FOR: ICD-10-CM

## 2019-09-09 DIAGNOSIS — G89.29 CHRONIC PAIN OF RIGHT KNEE: ICD-10-CM

## 2019-09-09 DIAGNOSIS — M25.561 CHRONIC PAIN OF RIGHT KNEE: ICD-10-CM

## 2019-09-09 DIAGNOSIS — Z23 NEED FOR INFLUENZA VACCINATION: ICD-10-CM

## 2019-09-09 PROCEDURE — 90653 IIV ADJUVANT VACCINE IM: CPT | Performed by: NURSE PRACTITIONER

## 2019-09-09 PROCEDURE — 1036F TOBACCO NON-USER: CPT | Performed by: NURSE PRACTITIONER

## 2019-09-09 PROCEDURE — G8417 CALC BMI ABV UP PARAM F/U: HCPCS | Performed by: NURSE PRACTITIONER

## 2019-09-09 PROCEDURE — 1090F PRES/ABSN URINE INCON ASSESS: CPT | Performed by: NURSE PRACTITIONER

## 2019-09-09 PROCEDURE — 4040F PNEUMOC VAC/ADMIN/RCVD: CPT | Performed by: NURSE PRACTITIONER

## 2019-09-09 PROCEDURE — G0008 ADMIN INFLUENZA VIRUS VAC: HCPCS | Performed by: NURSE PRACTITIONER

## 2019-09-09 PROCEDURE — 99214 OFFICE O/P EST MOD 30 MIN: CPT | Performed by: NURSE PRACTITIONER

## 2019-09-09 PROCEDURE — G8427 DOCREV CUR MEDS BY ELIG CLIN: HCPCS | Performed by: NURSE PRACTITIONER

## 2019-09-09 PROCEDURE — 3017F COLORECTAL CA SCREEN DOC REV: CPT | Performed by: NURSE PRACTITIONER

## 2019-09-09 PROCEDURE — 1123F ACP DISCUSS/DSCN MKR DOCD: CPT | Performed by: NURSE PRACTITIONER

## 2019-09-09 PROCEDURE — G8399 PT W/DXA RESULTS DOCUMENT: HCPCS | Performed by: NURSE PRACTITIONER

## 2019-09-09 ASSESSMENT — ENCOUNTER SYMPTOMS
SHORTNESS OF BREATH: 0
COUGH: 0

## 2019-09-09 NOTE — PROGRESS NOTES
Patient is present for 6 month f/u    Patient states she has a spot on her right leg  Patient states it has not changed shape or size but is concerned because her mom would get these and was told she had skin cancer    Patient states when she is urinating it is very slow and takes a while to go  Patient states this has been going on for 3-4 months    Pharmacy and medication reviewed with patient    Visit Information    Have you changed or started any medications since your last visit including any over-the-counter medicines, vitamins, or herbal medicines? no   Have you stopped taking any of your medications? Is so, why? -  no  Are you having any side effects from any of your medications? - no    Have you seen any other physician or provider since your last visit?  no   Have you had any other diagnostic tests since your last visit?  no   Have you been seen in the emergency room and/or had an admission in a hospital since we last saw you?  no   Have you had your routine dental cleaning in the past 6 months?  no     Do you have an active MyChart account? If no, what is the barrier?   Yes    Patient Care Team:  ZA John CNP as PCP - General (Certified Nurse Practitioner)  ZA John CNP as PCP - [facility] Provider  ZA John CNP as Referring Physician (Certified Nurse Practitioner)    Medical History Review  Past Medical, Family, and Social History reviewed and does contribute to the patient presenting condition    Health Maintenance   Topic Date Due    Annual Wellness Visit (AWV)  03/15/2015    Flu vaccine (1) 09/01/2019    Shingles Vaccine (1 of 2) 12/01/2019 (Originally 3/15/2002)    A1C test (Diabetic or Prediabetic)  02/11/2020    Potassium monitoring  02/22/2020    Creatinine monitoring  02/22/2020    Breast cancer screen  10/24/2020    Lipid screen  02/22/2024    DTaP/Tdap/Td vaccine (2 - Td) 08/21/2027    Colon cancer screen colonoscopy
Discussed use, benefit, and side effects of prescribed medications. Barriersto medication compliance addressed. All patient questions answered. Pt voiced understanding. 4.  Reviewed prior labs and health maintenance  5. Continue current medications, diet and exercise.     CompletedRefills   Requested Prescriptions      No prescriptions requested or ordered in this encounter         Electronically signed by Mile Pitts CNP on 9/10/2019 at 9:06 PM

## 2019-10-02 ENCOUNTER — HOSPITAL ENCOUNTER (OUTPATIENT)
Age: 69
Discharge: HOME | End: 2019-10-04
Payer: MEDICARE

## 2019-10-02 ENCOUNTER — HOSPITAL ENCOUNTER (OUTPATIENT)
Dept: GENERAL RADIOLOGY | Age: 69
Discharge: HOME | End: 2019-10-04
Payer: MEDICARE

## 2019-10-02 ENCOUNTER — HOSPITAL ENCOUNTER (OUTPATIENT)
Dept: VASCULAR LAB | Age: 69
Discharge: HOME | End: 2019-10-02
Payer: MEDICARE

## 2019-10-02 DIAGNOSIS — G89.29 CHRONIC PAIN OF RIGHT KNEE: ICD-10-CM

## 2019-10-02 DIAGNOSIS — M25.561 CHRONIC PAIN OF RIGHT KNEE: ICD-10-CM

## 2019-10-02 DIAGNOSIS — M79.605 LEG PAIN, ANTERIOR, LEFT: ICD-10-CM

## 2019-10-02 PROCEDURE — 73562 X-RAY EXAM OF KNEE 3: CPT

## 2019-10-02 PROCEDURE — 93971 EXTREMITY STUDY: CPT

## 2019-10-03 ENCOUNTER — PROCEDURE VISIT (OUTPATIENT)
Dept: FAMILY MEDICINE CLINIC | Age: 69
End: 2019-10-03
Payer: MEDICARE

## 2019-10-03 ENCOUNTER — HOSPITAL ENCOUNTER (OUTPATIENT)
Age: 69
Setting detail: SPECIMEN
Discharge: HOME | End: 2019-10-03
Payer: MEDICARE

## 2019-10-03 VITALS
TEMPERATURE: 97.4 F | BODY MASS INDEX: 36.18 KG/M2 | SYSTOLIC BLOOD PRESSURE: 124 MMHG | DIASTOLIC BLOOD PRESSURE: 80 MMHG | HEART RATE: 82 BPM | WEIGHT: 210.8 LBS | OXYGEN SATURATION: 96 %

## 2019-10-03 DIAGNOSIS — M17.11 ARTHRITIS OF RIGHT KNEE: Primary | ICD-10-CM

## 2019-10-03 DIAGNOSIS — L98.9 SKIN LESION OF RIGHT LEG: ICD-10-CM

## 2019-10-03 DIAGNOSIS — L53.8 OTHER SPECIFIED ERYTHEMATOUS CONDITIONS: ICD-10-CM

## 2019-10-03 PROCEDURE — G8417 CALC BMI ABV UP PARAM F/U: HCPCS | Performed by: NURSE PRACTITIONER

## 2019-10-03 PROCEDURE — G8399 PT W/DXA RESULTS DOCUMENT: HCPCS | Performed by: NURSE PRACTITIONER

## 2019-10-03 PROCEDURE — 99213 OFFICE O/P EST LOW 20 MIN: CPT | Performed by: NURSE PRACTITIONER

## 2019-10-03 PROCEDURE — G8482 FLU IMMUNIZE ORDER/ADMIN: HCPCS | Performed by: NURSE PRACTITIONER

## 2019-10-03 PROCEDURE — G8427 DOCREV CUR MEDS BY ELIG CLIN: HCPCS | Performed by: NURSE PRACTITIONER

## 2019-10-03 PROCEDURE — 1090F PRES/ABSN URINE INCON ASSESS: CPT | Performed by: NURSE PRACTITIONER

## 2019-10-03 PROCEDURE — 1036F TOBACCO NON-USER: CPT | Performed by: NURSE PRACTITIONER

## 2019-10-03 PROCEDURE — 4040F PNEUMOC VAC/ADMIN/RCVD: CPT | Performed by: NURSE PRACTITIONER

## 2019-10-03 PROCEDURE — 1123F ACP DISCUSS/DSCN MKR DOCD: CPT | Performed by: NURSE PRACTITIONER

## 2019-10-03 PROCEDURE — 11300 SHAVE SKIN LESION 0.5 CM/<: CPT | Performed by: NURSE PRACTITIONER

## 2019-10-03 PROCEDURE — 3017F COLORECTAL CA SCREEN DOC REV: CPT | Performed by: NURSE PRACTITIONER

## 2019-10-03 ASSESSMENT — ENCOUNTER SYMPTOMS
COLOR CHANGE: 1
SHORTNESS OF BREATH: 0
COUGH: 0

## 2019-10-07 LAB — DERMATOLOGY PATHOLOGY REPORT: NORMAL

## 2019-10-08 ENCOUNTER — TELEPHONE (OUTPATIENT)
Dept: FAMILY MEDICINE CLINIC | Age: 69
End: 2019-10-08

## 2019-10-08 DIAGNOSIS — L98.9 SKIN LESION OF RIGHT LEG: Primary | ICD-10-CM

## 2019-10-14 ENCOUNTER — OFFICE VISIT (OUTPATIENT)
Dept: FAMILY MEDICINE CLINIC | Age: 69
End: 2019-10-14
Payer: MEDICARE

## 2019-10-14 VITALS
SYSTOLIC BLOOD PRESSURE: 122 MMHG | OXYGEN SATURATION: 96 % | BODY MASS INDEX: 35.78 KG/M2 | HEIGHT: 64 IN | TEMPERATURE: 97.3 F | WEIGHT: 209.6 LBS | HEART RATE: 69 BPM | DIASTOLIC BLOOD PRESSURE: 80 MMHG

## 2019-10-14 DIAGNOSIS — Z00.00 ROUTINE GENERAL MEDICAL EXAMINATION AT A HEALTH CARE FACILITY: Primary | ICD-10-CM

## 2019-10-14 PROCEDURE — 4040F PNEUMOC VAC/ADMIN/RCVD: CPT | Performed by: NURSE PRACTITIONER

## 2019-10-14 PROCEDURE — 3017F COLORECTAL CA SCREEN DOC REV: CPT | Performed by: NURSE PRACTITIONER

## 2019-10-14 PROCEDURE — G8482 FLU IMMUNIZE ORDER/ADMIN: HCPCS | Performed by: NURSE PRACTITIONER

## 2019-10-14 PROCEDURE — 1123F ACP DISCUSS/DSCN MKR DOCD: CPT | Performed by: NURSE PRACTITIONER

## 2019-10-14 PROCEDURE — G0438 PPPS, INITIAL VISIT: HCPCS | Performed by: NURSE PRACTITIONER

## 2019-10-14 ASSESSMENT — PATIENT HEALTH QUESTIONNAIRE - PHQ9
SUM OF ALL RESPONSES TO PHQ QUESTIONS 1-9: 0
SUM OF ALL RESPONSES TO PHQ QUESTIONS 1-9: 0

## 2019-10-14 ASSESSMENT — LIFESTYLE VARIABLES
HOW OFTEN DO YOU HAVE SIX OR MORE DRINKS ON ONE OCCASION: 0
AUDIT-C TOTAL SCORE: 2
HOW MANY STANDARD DRINKS CONTAINING ALCOHOL DO YOU HAVE ON A TYPICAL DAY: 0
HOW OFTEN DO YOU HAVE A DRINK CONTAINING ALCOHOL: 2

## 2019-10-16 ENCOUNTER — TELEPHONE (OUTPATIENT)
Dept: FAMILY MEDICINE CLINIC | Age: 69
End: 2019-10-16

## 2019-10-17 ENCOUNTER — TELEPHONE (OUTPATIENT)
Dept: FAMILY MEDICINE CLINIC | Age: 69
End: 2019-10-17

## 2019-10-31 DIAGNOSIS — M17.11 ARTHRITIS OF KNEE, RIGHT: Primary | ICD-10-CM

## 2019-11-01 ENCOUNTER — OFFICE VISIT (OUTPATIENT)
Dept: ORTHOPEDIC SURGERY | Age: 69
End: 2019-11-01
Payer: MEDICARE

## 2019-11-01 VITALS — BODY MASS INDEX: 35.79 KG/M2 | WEIGHT: 209.66 LBS | HEIGHT: 64 IN

## 2019-11-01 DIAGNOSIS — Z12.31 SCREENING MAMMOGRAM, ENCOUNTER FOR: ICD-10-CM

## 2019-11-01 DIAGNOSIS — M17.11 ARTHRITIS OF KNEE, RIGHT: Primary | ICD-10-CM

## 2019-11-01 PROCEDURE — G8482 FLU IMMUNIZE ORDER/ADMIN: HCPCS | Performed by: ORTHOPAEDIC SURGERY

## 2019-11-01 PROCEDURE — 20610 DRAIN/INJ JOINT/BURSA W/O US: CPT | Performed by: ORTHOPAEDIC SURGERY

## 2019-11-01 PROCEDURE — G8399 PT W/DXA RESULTS DOCUMENT: HCPCS | Performed by: ORTHOPAEDIC SURGERY

## 2019-11-01 PROCEDURE — G8427 DOCREV CUR MEDS BY ELIG CLIN: HCPCS | Performed by: ORTHOPAEDIC SURGERY

## 2019-11-01 PROCEDURE — 1123F ACP DISCUSS/DSCN MKR DOCD: CPT | Performed by: ORTHOPAEDIC SURGERY

## 2019-11-01 PROCEDURE — 3017F COLORECTAL CA SCREEN DOC REV: CPT | Performed by: ORTHOPAEDIC SURGERY

## 2019-11-01 PROCEDURE — 1090F PRES/ABSN URINE INCON ASSESS: CPT | Performed by: ORTHOPAEDIC SURGERY

## 2019-11-01 PROCEDURE — 4040F PNEUMOC VAC/ADMIN/RCVD: CPT | Performed by: ORTHOPAEDIC SURGERY

## 2019-11-01 PROCEDURE — 99213 OFFICE O/P EST LOW 20 MIN: CPT | Performed by: ORTHOPAEDIC SURGERY

## 2019-11-01 PROCEDURE — 1036F TOBACCO NON-USER: CPT | Performed by: ORTHOPAEDIC SURGERY

## 2019-11-01 PROCEDURE — G8417 CALC BMI ABV UP PARAM F/U: HCPCS | Performed by: ORTHOPAEDIC SURGERY

## 2019-11-01 RX ORDER — BUPIVACAINE HYDROCHLORIDE 2.5 MG/ML
2 INJECTION, SOLUTION INFILTRATION; PERINEURAL ONCE
Status: COMPLETED | OUTPATIENT
Start: 2019-11-01 | End: 2019-11-01

## 2019-11-01 RX ORDER — METHYLPREDNISOLONE ACETATE 80 MG/ML
80 INJECTION, SUSPENSION INTRA-ARTICULAR; INTRALESIONAL; INTRAMUSCULAR; SOFT TISSUE ONCE
Status: COMPLETED | OUTPATIENT
Start: 2019-11-01 | End: 2019-11-01

## 2019-11-01 RX ADMIN — METHYLPREDNISOLONE ACETATE 80 MG: 80 INJECTION, SUSPENSION INTRA-ARTICULAR; INTRALESIONAL; INTRAMUSCULAR; SOFT TISSUE at 13:13

## 2019-11-01 RX ADMIN — BUPIVACAINE HYDROCHLORIDE 5 MG: 2.5 INJECTION, SOLUTION INFILTRATION; PERINEURAL at 13:13

## 2019-11-01 ASSESSMENT — ENCOUNTER SYMPTOMS
BACK PAIN: 0
WHEEZING: 0
SHORTNESS OF BREATH: 0

## 2019-11-14 ENCOUNTER — TELEPHONE (OUTPATIENT)
Dept: FAMILY MEDICINE CLINIC | Age: 69
End: 2019-11-14

## 2019-11-27 DIAGNOSIS — I10 ESSENTIAL HYPERTENSION: ICD-10-CM

## 2019-11-27 RX ORDER — LISINOPRIL AND HYDROCHLOROTHIAZIDE 20; 12.5 MG/1; MG/1
TABLET ORAL
Qty: 90 TABLET | Refills: 1 | Status: SHIPPED | OUTPATIENT
Start: 2019-11-27 | End: 2020-03-09 | Stop reason: SDUPTHER

## 2019-12-02 ENCOUNTER — OFFICE VISIT (OUTPATIENT)
Dept: ORTHOPEDIC SURGERY | Age: 69
End: 2019-12-02
Payer: MEDICARE

## 2019-12-02 VITALS — BODY MASS INDEX: 35.68 KG/M2 | WEIGHT: 209 LBS | HEIGHT: 64 IN

## 2019-12-02 DIAGNOSIS — M17.11 ARTHRITIS OF KNEE, RIGHT: Primary | ICD-10-CM

## 2019-12-02 PROCEDURE — 1036F TOBACCO NON-USER: CPT | Performed by: ORTHOPAEDIC SURGERY

## 2019-12-02 PROCEDURE — G8399 PT W/DXA RESULTS DOCUMENT: HCPCS | Performed by: ORTHOPAEDIC SURGERY

## 2019-12-02 PROCEDURE — 1090F PRES/ABSN URINE INCON ASSESS: CPT | Performed by: ORTHOPAEDIC SURGERY

## 2019-12-02 PROCEDURE — 1123F ACP DISCUSS/DSCN MKR DOCD: CPT | Performed by: ORTHOPAEDIC SURGERY

## 2019-12-02 PROCEDURE — 3017F COLORECTAL CA SCREEN DOC REV: CPT | Performed by: ORTHOPAEDIC SURGERY

## 2019-12-02 PROCEDURE — G8482 FLU IMMUNIZE ORDER/ADMIN: HCPCS | Performed by: ORTHOPAEDIC SURGERY

## 2019-12-02 PROCEDURE — G8427 DOCREV CUR MEDS BY ELIG CLIN: HCPCS | Performed by: ORTHOPAEDIC SURGERY

## 2019-12-02 PROCEDURE — 99213 OFFICE O/P EST LOW 20 MIN: CPT | Performed by: ORTHOPAEDIC SURGERY

## 2019-12-02 PROCEDURE — 4040F PNEUMOC VAC/ADMIN/RCVD: CPT | Performed by: ORTHOPAEDIC SURGERY

## 2019-12-02 PROCEDURE — G8417 CALC BMI ABV UP PARAM F/U: HCPCS | Performed by: ORTHOPAEDIC SURGERY

## 2019-12-02 ASSESSMENT — ENCOUNTER SYMPTOMS
COUGH: 0
NAUSEA: 0
CONSTIPATION: 0
DIARRHEA: 0

## 2020-03-02 RX ORDER — LISINOPRIL AND HYDROCHLOROTHIAZIDE 20; 12.5 MG/1; MG/1
TABLET ORAL
Qty: 90 TABLET | Refills: 1 | OUTPATIENT
Start: 2020-03-02

## 2020-03-09 ENCOUNTER — OFFICE VISIT (OUTPATIENT)
Dept: FAMILY MEDICINE CLINIC | Age: 70
End: 2020-03-09
Payer: MEDICARE

## 2020-03-09 VITALS
BODY MASS INDEX: 35.22 KG/M2 | DIASTOLIC BLOOD PRESSURE: 70 MMHG | OXYGEN SATURATION: 96 % | WEIGHT: 205.2 LBS | SYSTOLIC BLOOD PRESSURE: 122 MMHG | TEMPERATURE: 97.1 F | HEART RATE: 65 BPM

## 2020-03-09 PROCEDURE — 1123F ACP DISCUSS/DSCN MKR DOCD: CPT | Performed by: NURSE PRACTITIONER

## 2020-03-09 PROCEDURE — G8482 FLU IMMUNIZE ORDER/ADMIN: HCPCS | Performed by: NURSE PRACTITIONER

## 2020-03-09 PROCEDURE — G8417 CALC BMI ABV UP PARAM F/U: HCPCS | Performed by: NURSE PRACTITIONER

## 2020-03-09 PROCEDURE — 3017F COLORECTAL CA SCREEN DOC REV: CPT | Performed by: NURSE PRACTITIONER

## 2020-03-09 PROCEDURE — 1036F TOBACCO NON-USER: CPT | Performed by: NURSE PRACTITIONER

## 2020-03-09 PROCEDURE — 1090F PRES/ABSN URINE INCON ASSESS: CPT | Performed by: NURSE PRACTITIONER

## 2020-03-09 PROCEDURE — 4040F PNEUMOC VAC/ADMIN/RCVD: CPT | Performed by: NURSE PRACTITIONER

## 2020-03-09 PROCEDURE — G8399 PT W/DXA RESULTS DOCUMENT: HCPCS | Performed by: NURSE PRACTITIONER

## 2020-03-09 PROCEDURE — 99213 OFFICE O/P EST LOW 20 MIN: CPT | Performed by: NURSE PRACTITIONER

## 2020-03-09 PROCEDURE — G8427 DOCREV CUR MEDS BY ELIG CLIN: HCPCS | Performed by: NURSE PRACTITIONER

## 2020-03-09 RX ORDER — LISINOPRIL AND HYDROCHLOROTHIAZIDE 20; 12.5 MG/1; MG/1
TABLET ORAL
Qty: 90 TABLET | Refills: 1 | Status: SHIPPED | OUTPATIENT
Start: 2020-03-09 | End: 2020-11-12

## 2020-03-09 ASSESSMENT — ENCOUNTER SYMPTOMS
SHORTNESS OF BREATH: 0
COUGH: 0

## 2020-03-09 NOTE — PROGRESS NOTES
[address] [place]  [phone]    Dmitry Munson is a 81 y.o. female who presents today for her  medical conditions/complaints as noted below. Lesly Treadwell is c/o of 6 Month Follow-Up  . HPI:     HPI   Hypertension: Patient here for follow-up of elevated blood pressure. She is exercising and is adherent to low salt diet. Blood pressure is well controlled at home. Cardiac symptoms none. Patient denies chest pain, chest pressure/discomfort, exertional chest pressure/discomfort, irregular heart beat and lower extremity edema. Cardiovascular risk factors: advanced age (older than 54 for men, 72 for women), hypertension and obesity (BMI >= 30 kg/m2). Use of agents associated with hypertension: none. History of target organ damage: none. She states her weight had climbed back up, she states she is down thirty pounds. Wt Readings from Last 3 Encounters:   03/09/20 205 lb 3.2 oz (93.1 kg)   12/02/19 209 lb (94.8 kg)   11/01/19 209 lb 10.5 oz (95.1 kg)     Nursing note reviewed and discussed with patient. Patient's medications, allergies, past medical, surgical, social and family histories were reviewed and updated as appropriate. Current Outpatient Medications   Medication Sig Dispense Refill    lisinopril-hydroCHLOROthiazide (PRINZIDE;ZESTORETIC) 20-12.5 MG per tablet Take one tablet by mouth daily 90 tablet 1    Calcium Carb-Cholecalciferol (CALCIUM-VITAMIN D) 600-400 MG-UNIT TABS Take 1 Dose by mouth daily 30 tablet 5    aspirin 81 MG EC tablet Take 1 tablet by mouth 2 times daily 84 tablet 0     No current facility-administered medications for this visit.       Past Medical History:   Diagnosis Date    Arthritis     osteoarthritis    Hypertension       Past Surgical History:   Procedure Laterality Date    CHOLECYSTECTOMY, LAPAROSCOPIC  2012    DILATION AND CURETTAGE OF UTERUS  1982    HIP ARTHROPLASTY Left     AL TOTAL HIP ARTHROPLASTY Left 11/14/2017    HIP TOTAL ARTHROPLASTY ANTERIOR APPROACH (MEDACTA, FASCIA ILIACA BLOCK VS. LUMBAR PLEXUS PRE-OP, SPINAL VS. GENERAL, MEDACTA TABLE, C-ARM) performed by Awa Antonio DO at [address] [place] Bilateral     as child (lazy eye?? )    TUBAL LIGATION       Family History   Problem Relation Age of Onset    Heart Disease Mother     Diabetes Father     Diabetes Brother      Social History     Tobacco Use    Smoking status: Never Smoker    Smokeless tobacco: Never Used   Substance Use Topics    Alcohol use: Yes     Comment: 1 glass per month      Allergies   Allergen Reactions    Allopurinol     Morphine Other (See Comments)     Does not like how it makes her feel       Subjective:      Review of Systems   Constitutional: Negative for chills and fever. Respiratory: Negative for cough and shortness of breath. Cardiovascular: Negative for chest pain, palpitations and leg swelling. Other pertinent ROS in HPI  Objective:     /70 (Site: Left Upper Arm, Position: Sitting, Cuff Size: Large Adult)   Pulse 65   Temp 97.1 °F (36.2 °C) (Oral)   Wt 205 lb 3.2 oz (93.1 kg)   SpO2 96%   BMI 35.22 kg/m²    Physical Exam  Constitutional:       Appearance: She is well-developed. HENT:      Right Ear: External ear normal.      Left Ear: External ear normal.      Nose: Nose normal.   Neck:      Musculoskeletal: Full passive range of motion without pain and normal range of motion. Cardiovascular:      Rate and Rhythm: Normal rate and regular rhythm. Heart sounds: Normal heart sounds, S1 normal and S2 normal.   Pulmonary:      Effort: Pulmonary effort is normal. No respiratory distress. Breath sounds: Normal breath sounds. Musculoskeletal: Normal range of motion. General: No deformity. Skin:     General: Skin is warm and dry. Neurological:      Mental Status: She is alert and oriented to person, place, and time. Assessment/PLAN   1. Essential hypertension    - CBC; Future  - Comprehensive Metabolic Panel; Future  - lisinopril-hydroCHLOROthiazide (PRINZIDE;ZESTORETIC) 20-12.5 MG per tablet; Take one tablet by mouth daily  Dispense: 90 tablet; Refill: 1    2. Hyperglycemia    - Hemoglobin A1C; Future    3. High triglycerides    - Lipid, Fasting; Future      RTO if symptoms worsen or fail to improve  Pt agreeable with plan      Patient given educational materials - see patientinstructions. Discussed use, benefit, and side effects of prescribed medications. All patient questions answered. Pt voiced understanding. Reviewed health maintenance. Instructed to continue current medications, diet andexercise. 1.  Hank Cea received counseling on the following healthy behaviors: nutrition, exercise and medication adherence  2. Patient given educationalmaterials when available - see patient instructions when applicable  3. Discussed use, benefit, and side effects of prescribed medications. Barriersto medication compliance addressed. All patient questions answered. Pt voiced understanding. 4.  Reviewed prior labs and health maintenance when applicable. 5.  Continue current medications, diet and exercise. CompletedRefills   Requested Prescriptions     Signed Prescriptions Disp Refills    lisinopril-hydroCHLOROthiazide (PRINZIDE;ZESTORETIC) 20-12.5 MG per tablet 90 tablet 1     Sig: Take one tablet by mouth daily       This note was transcribed using dictation with Dragon services. Efforts were made to correct any errors but some words may be misinterpreted.     Electronically signed by Rebeca Bravo CNP on 3/9/2020 at 9:23 AM

## 2020-07-20 ENCOUNTER — HOSPITAL ENCOUNTER (EMERGENCY)
Age: 70
Discharge: HOME | End: 2020-07-20
Attending: EMERGENCY MEDICINE
Payer: MEDICARE

## 2020-07-20 ENCOUNTER — TELEPHONE (OUTPATIENT)
Dept: FAMILY MEDICINE CLINIC | Age: 70
End: 2020-07-20

## 2020-07-20 VITALS
HEART RATE: 87 BPM | WEIGHT: 180 LBS | SYSTOLIC BLOOD PRESSURE: 159 MMHG | HEIGHT: 66 IN | OXYGEN SATURATION: 97 % | TEMPERATURE: 97.5 F | DIASTOLIC BLOOD PRESSURE: 94 MMHG | BODY MASS INDEX: 28.93 KG/M2 | RESPIRATION RATE: 18 BRPM

## 2020-07-20 PROCEDURE — 6370000000 HC RX 637 (ALT 250 FOR IP): Performed by: STUDENT IN AN ORGANIZED HEALTH CARE EDUCATION/TRAINING PROGRAM

## 2020-07-20 PROCEDURE — 99283 EMERGENCY DEPT VISIT LOW MDM: CPT

## 2020-07-20 PROCEDURE — 6360000002 HC RX W HCPCS: Performed by: STUDENT IN AN ORGANIZED HEALTH CARE EDUCATION/TRAINING PROGRAM

## 2020-07-20 PROCEDURE — 96372 THER/PROPH/DIAG INJ SC/IM: CPT

## 2020-07-20 RX ORDER — IBUPROFEN 400 MG/1
400 TABLET ORAL ONCE
Status: COMPLETED | OUTPATIENT
Start: 2020-07-20 | End: 2020-07-20

## 2020-07-20 RX ORDER — METHOCARBAMOL 500 MG/1
500 TABLET, FILM COATED ORAL 3 TIMES DAILY
Qty: 10 TABLET | Refills: 0 | Status: SHIPPED | OUTPATIENT
Start: 2020-07-20 | End: 2020-07-21

## 2020-07-20 RX ORDER — ORPHENADRINE CITRATE 30 MG/ML
60 INJECTION INTRAMUSCULAR; INTRAVENOUS ONCE
Status: COMPLETED | OUTPATIENT
Start: 2020-07-20 | End: 2020-07-20

## 2020-07-20 RX ADMIN — ORPHENADRINE CITRATE 60 MG: 60 INJECTION INTRAMUSCULAR; INTRAVENOUS at 09:49

## 2020-07-20 RX ADMIN — IBUPROFEN 400 MG: 400 TABLET, FILM COATED ORAL at 09:50

## 2020-07-20 ASSESSMENT — PAIN DESCRIPTION - ORIENTATION: ORIENTATION: UPPER

## 2020-07-20 ASSESSMENT — PAIN DESCRIPTION - FREQUENCY: FREQUENCY: CONTINUOUS

## 2020-07-20 ASSESSMENT — PAIN DESCRIPTION - PAIN TYPE: TYPE: ACUTE PAIN

## 2020-07-20 ASSESSMENT — PAIN DESCRIPTION - PROGRESSION: CLINICAL_PROGRESSION: NOT CHANGED

## 2020-07-20 ASSESSMENT — ENCOUNTER SYMPTOMS
NAUSEA: 0
DIARRHEA: 0
COUGH: 0
ABDOMINAL PAIN: 0
VOMITING: 0
BACK PAIN: 1
SHORTNESS OF BREATH: 0
WHEEZING: 0
BLOOD IN STOOL: 0

## 2020-07-20 ASSESSMENT — PAIN DESCRIPTION - DESCRIPTORS: DESCRIPTORS: SHARP

## 2020-07-20 ASSESSMENT — PAIN DESCRIPTION - ONSET: ONSET: ON-GOING

## 2020-07-20 ASSESSMENT — PAIN SCALES - GENERAL: PAINLEVEL_OUTOF10: 10

## 2020-07-20 ASSESSMENT — PAIN DESCRIPTION - LOCATION: LOCATION: BACK

## 2020-07-20 NOTE — TELEPHONE ENCOUNTER
Patient called due to going to ER earlier for back pain between shoulder blades. States they gave her an injection and some medication. She stated the medication (Robaxin) is not helping at all.      Wondering if there is something else she can try

## 2020-07-20 NOTE — ED PROVIDER NOTES
[facility] ED  Emergency Department Encounter  EmergencyMedicine Resident     Pt Name:Xiomara Treadwell  MRN: [id number]  Armshosseingfnicolas 1950  Date of evaluation: 7/20/20  PCP:  [id number]       Chief Complaint   Patient presents with    Back Pain     thoracic pain, woke up this am with it. no known injury        HISTORY OF PRESENT ILLNESS  (Location/Symptom, Timing/Onset, Context/Setting, Quality, Duration, Modifying Factors, Severity.)    This patient was evaluated in the Emergency Department for symptoms described in the history of present illness. He/she was evaluated in the context of the global COVID-19 pandemic, which necessitated consideration that the patient might be at risk for infection with the SARS-CoV-2 virus that causes COVID-19. Institutional protocols and algorithms that pertain to the evaluation of patients at risk for COVID-19 are in a state of rapid change based on information released by regulatory bodies including the CDC and federal and state organizations. These policies and algorithms were followed during the patient's care in the ED. Sergio Treadwell is a 78 y.o. female who presents to the ED today with complaints of mid back pain that started when she woke up this morning. Went to bed with no back pain. Reports that it is moderate in severity. Worse with movement. No history of chronic back pain. Denies any recent traumatic injury, specifically denies any falls. No strenuous activity that could have caused strain. Patient strongly feels that she just slept on it wrong. Is able to walk without any difficulties, no bowel/bladder incontinence, no numbness or tingling, normal range of motion of the back. Denies any history of IV drug use, fever, chills, dysuria, hematuria, abdominal pain, nausea or vomiting.     PAST MEDICAL / SURGICAL / SOCIAL / FAMILY HISTORY      has a past medical history of Arthritis and (ROBAXIN) 500 MG tablet Take 1 tablet by mouth 3 times daily for 10 doses 7/20/20 7/24/20 Yes Kathy Davila, DO   lisinopril-hydroCHLOROthiazide (PRINZIDE;ZESTORETIC) 20-12.5 MG per tablet Take one tablet by mouth daily 3/9/20   Tasha MarshallZA - CNP   Calcium Carb-Cholecalciferol (CALCIUM-VITAMIN D) 600-400 MG-UNIT TABS Take 1 Dose by mouth daily 12/11/17   Tasha Joanna, APRN - CNP   aspirin 81 MG EC tablet Take 1 tablet by mouth 2 times daily 11/15/17 3/9/20  Nancy Stinson, DO       REVIEW OF SYSTEMS    (2-9 systems for level 4, 10 or more for level 5)      Review of Systems   Constitutional: Negative for chills and fever. Eyes: Negative for visual disturbance. Respiratory: Negative for cough, shortness of breath and wheezing. Cardiovascular: Negative for chest pain. Gastrointestinal: Negative for abdominal pain, blood in stool, diarrhea, nausea and vomiting. Genitourinary: Negative for dysuria and hematuria. Denies incontinence   Musculoskeletal: Positive for back pain. Skin: Negative for rash. Neurological: Negative for dizziness, weakness, numbness and headaches. PHYSICAL EXAM   (up to 7 for level 4, 8 or more for level 5)      INITIAL VITALS:   BP (!) 159/94   Pulse 87   Temp 97.5 °F (36.4 °C) (Oral)   Resp 18   Ht 5' 6\" (1.676 m)   Wt 180 lb (81.6 kg)   SpO2 97%   BMI 29.05 kg/m²     Physical Exam  Constitutional:       General: She is not in acute distress. Appearance: Normal appearance. HENT:      Head: Normocephalic and atraumatic. Nose: Nose normal.      Mouth/Throat:      Mouth: Mucous membranes are moist.      Pharynx: No oropharyngeal exudate or posterior oropharyngeal erythema. Eyes:      Extraocular Movements: Extraocular movements intact. Conjunctiva/sclera: Conjunctivae normal.   Neck:      Musculoskeletal: Normal range of motion. No muscular tenderness.    Cardiovascular:      Rate and Rhythm: Normal rate and regular tenderness, very mild point tenderness, majority of tenderness is paraspinal, no radiation of pain or tenderness. Normal range of motion of back including able to bend down to touch her toes. Able to stand on heels and toes. Normal gait. Abdomen soft, nontender nondistended, no pulsatile mass. Normal peripheral pulses. 5/5 strength in bilateral lower extremities, no sensory deficits. Suspect intrathoracic versus superior lumbar muscle strain. Will give Norflex and ibuprofen. Patient is already taken Tylenol and applied lidocaine patch this morning. Very low suspicion for AAA given exam.  Do not feel CT abdomen is indicated at this time. Also low suspicion for UTI versus pyelonephritis. Normal range of motion and no traumatic injury, very low suspicion for fracture. Given Norflex. Patient comfortable plan to go home with Robaxin. All questions answered. Strict return precautions provided. FINAL IMPRESSION      1.  Back strain, initial encounter          DISPOSITION / PLAN     DISPOSITION Decision To Discharge 07/20/2020 09:57:25 AM      PATIENT REFERRED TO:  Shamika Piña, ZA [facility]  [address] [facility]   [phone]    Schedule an appointment as soon as possible for a visit       [facility] ED  [address] [address]  [phone]    As needed, If symptoms worsen      DISCHARGE MEDICATIONS:  Discharge Medication List as of 7/20/2020  9:59 AM      START taking these medications    Details   methocarbamol (ROBAXIN) 500 MG tablet Take 1 tablet by mouth 3 times daily for 10 doses, Disp-10 tablet,R-0Print             Gladys Bullock DO  Emergency Medicine Resident    (Please note that portions of thisnote were completed with a voice recognition program.  Efforts were made to edit the dictations but occasionally words are mis-transcribed.)     Gladys Bullock DO  Resident  07/20/20 0509

## 2020-07-20 NOTE — ED PROVIDER NOTES
[facility]     Emergency Department     Faculty Attestation    I performed a history and physical examination of the patient and discussed management with the resident. I have reviewed and agree with the residents findings including all diagnostic interpretations, and treatment plans as written at the time of my review. Any areas of disagreement are noted on the chart. I was personally present for the key portions of any procedures. I have documented in the chart those procedures where I was not present during the key portions. For Physician Assistant/ Nurse Practitioner cases/documentation I have personally evaluated this patient and have completed at least one if not all key elements of the E/M (history, physical exam, and MDM). Additional findings are as noted. This patient was evaluated in the Emergency Department for symptoms described in the history of present illness. The patient was evaluated in the context of the global COVID-19 pandemic, which necessitated consideration that the patient might be at risk for infection with the SARS-CoV-2 virus that causes COVID-19. Institutional protocols and algorithms that pertain to the evaluation of patients at risk for COVID-19 are in a state of rapid change based on information released by regulatory bodies including the CDC and federal and state organizations. These policies and algorithms were followed during the patient's care in the ED. Primary Care Physician: ZA Noyola - CNP    History: This is a 78 y.o. female who presents to the Emergency Department with complaint of mid back pain. This began early this morning when she woke up. She has put a lidocaine patch on as well as taken Tylenol without improvement of his symptoms. Movement or deep inspiration exacerbates the pain. She denies any fever, chills or sweats. She denies any dysuria, frequency urgency.   She denies any history of IV drug abuse. Patient denies any numbness, tingling or weakness. Physical:   height is 5' 6\" (1.676 m) and weight is 180 lb (81.6 kg). Her oral temperature is 97.5 °F (36.4 °C). Her blood pressure is 159/94 (abnormal) and her pulse is 87. Her respiration is 18 and oxygen saturation is 97%. There is tenderness to palpation of the paraspinal lower thoracic spine area. There is no midline cervical or lumbar spinal tenderness. Patient moves all extremities well. Impression: Thoracic back pain    Plan: Muscle relaxant, discharge       (Please note that portions of this note were completed with a voice recognition program.  Efforts were made to edit the dictations but occasionally words are mis-transcribed.)    Rd Tejada.  Arpit Jurado MD, [facility]  Attending Emergency Medicine Physician        Gomze Lord MD  07/20/20 3210

## 2020-07-21 ENCOUNTER — APPOINTMENT (OUTPATIENT)
Dept: CT IMAGING | Age: 70
End: 2020-07-21
Payer: MEDICARE

## 2020-07-21 ENCOUNTER — HOSPITAL ENCOUNTER (EMERGENCY)
Age: 70
Discharge: HOME | End: 2020-07-21
Attending: EMERGENCY MEDICINE
Payer: MEDICARE

## 2020-07-21 ENCOUNTER — APPOINTMENT (OUTPATIENT)
Dept: GENERAL RADIOLOGY | Age: 70
End: 2020-07-21
Payer: MEDICARE

## 2020-07-21 VITALS
TEMPERATURE: 97.6 F | OXYGEN SATURATION: 97 % | RESPIRATION RATE: 15 BRPM | HEART RATE: 71 BPM | BODY MASS INDEX: 29.05 KG/M2 | HEIGHT: 66 IN | DIASTOLIC BLOOD PRESSURE: 80 MMHG | SYSTOLIC BLOOD PRESSURE: 137 MMHG

## 2020-07-21 LAB
ABSOLUTE EOS #: 0.03 K/UL (ref 0–0.44)
ABSOLUTE IMMATURE GRANULOCYTE: 0.03 K/UL (ref 0–0.3)
ABSOLUTE LYMPH #: 1.37 K/UL (ref 1.1–3.7)
ABSOLUTE MONO #: 0.42 K/UL (ref 0.1–1.2)
ALBUMIN SERPL-MCNC: 4.2 G/DL (ref 3.5–5.2)
ALBUMIN/GLOBULIN RATIO: 1.4 (ref 1–2.5)
ALP BLD-CCNC: 103 U/L (ref 35–104)
ALT SERPL-CCNC: 23 U/L (ref 5–33)
ANION GAP SERPL CALCULATED.3IONS-SCNC: 13 MMOL/L (ref 9–17)
AST SERPL-CCNC: 32 U/L
BASOPHILS # BLD: 1 % (ref 0–2)
BASOPHILS ABSOLUTE: 0.03 K/UL (ref 0–0.2)
BILIRUB SERPL-MCNC: 0.45 MG/DL (ref 0.3–1.2)
BILIRUBIN URINE: NEGATIVE
BUN BLDV-MCNC: 19 MG/DL (ref 8–23)
BUN/CREAT BLD: ABNORMAL (ref 9–20)
CALCIUM SERPL-MCNC: 10.1 MG/DL (ref 8.6–10.4)
CHLORIDE BLD-SCNC: 100 MMOL/L (ref 98–107)
CO2: 23 MMOL/L (ref 20–31)
COLOR: YELLOW
COMMENT UA: NORMAL
CREAT SERPL-MCNC: 0.81 MG/DL (ref 0.5–0.9)
DIFFERENTIAL TYPE: ABNORMAL
EOSINOPHILS RELATIVE PERCENT: 1 % (ref 1–4)
GFR AFRICAN AMERICAN: >60 ML/MIN
GFR NON-AFRICAN AMERICAN: >60 ML/MIN
GFR SERPL CREATININE-BSD FRML MDRD: ABNORMAL ML/MIN/{1.73_M2}
GFR SERPL CREATININE-BSD FRML MDRD: ABNORMAL ML/MIN/{1.73_M2}
GLUCOSE BLD-MCNC: 115 MG/DL (ref 70–99)
GLUCOSE URINE: NEGATIVE
HCT VFR BLD CALC: 48 % (ref 36.3–47.1)
HEMOGLOBIN: 15.6 G/DL (ref 11.9–15.1)
IMMATURE GRANULOCYTES: 1 %
KETONES, URINE: NEGATIVE
LEUKOCYTE ESTERASE, URINE: NEGATIVE
LIPASE: 29 U/L (ref 13–60)
LYMPHOCYTES # BLD: 21 % (ref 24–43)
MCH RBC QN AUTO: 27.9 PG (ref 25.2–33.5)
MCHC RBC AUTO-ENTMCNC: 32.5 G/DL (ref 28.4–34.8)
MCV RBC AUTO: 85.7 FL (ref 82.6–102.9)
MONOCYTES # BLD: 6 % (ref 3–12)
NITRITE, URINE: NEGATIVE
NRBC AUTOMATED: 0 PER 100 WBC
PDW BLD-RTO: 14.3 % (ref 11.8–14.4)
PH UA: 7 (ref 5–8)
PLATELET # BLD: 224 K/UL (ref 138–453)
PLATELET ESTIMATE: ABNORMAL
PMV BLD AUTO: 11.2 FL (ref 8.1–13.5)
POTASSIUM SERPL-SCNC: 4.3 MMOL/L (ref 3.7–5.3)
PROTEIN UA: NEGATIVE
RBC # BLD: 5.6 M/UL (ref 3.95–5.11)
RBC # BLD: ABNORMAL 10*6/UL
SEG NEUTROPHILS: 70 % (ref 36–65)
SEGMENTED NEUTROPHILS ABSOLUTE COUNT: 4.78 K/UL (ref 1.5–8.1)
SODIUM BLD-SCNC: 136 MMOL/L (ref 135–144)
SPECIFIC GRAVITY UA: 1.01 (ref 1–1.03)
TOTAL PROTEIN: 7.3 G/DL (ref 6.4–8.3)
TROPONIN INTERP: NORMAL
TROPONIN INTERP: NORMAL
TROPONIN T: NORMAL NG/ML
TROPONIN T: NORMAL NG/ML
TROPONIN, HIGH SENSITIVITY: 11 NG/L (ref 0–14)
TROPONIN, HIGH SENSITIVITY: 8 NG/L (ref 0–14)
TURBIDITY: CLEAR
URINE HGB: NEGATIVE
UROBILINOGEN, URINE: NORMAL
WBC # BLD: 6.7 K/UL (ref 3.5–11.3)
WBC # BLD: ABNORMAL 10*3/UL

## 2020-07-21 PROCEDURE — 96376 TX/PRO/DX INJ SAME DRUG ADON: CPT

## 2020-07-21 PROCEDURE — 96372 THER/PROPH/DIAG INJ SC/IM: CPT

## 2020-07-21 PROCEDURE — 83690 ASSAY OF LIPASE: CPT

## 2020-07-21 PROCEDURE — 84484 ASSAY OF TROPONIN QUANT: CPT

## 2020-07-21 PROCEDURE — 80053 COMPREHEN METABOLIC PANEL: CPT

## 2020-07-21 PROCEDURE — 81003 URINALYSIS AUTO W/O SCOPE: CPT

## 2020-07-21 PROCEDURE — 93005 ELECTROCARDIOGRAM TRACING: CPT | Performed by: EMERGENCY MEDICINE

## 2020-07-21 PROCEDURE — 85025 COMPLETE CBC W/AUTO DIFF WBC: CPT

## 2020-07-21 PROCEDURE — 74176 CT ABD & PELVIS W/O CONTRAST: CPT

## 2020-07-21 PROCEDURE — 71045 X-RAY EXAM CHEST 1 VIEW: CPT

## 2020-07-21 PROCEDURE — 2500000003 HC RX 250 WO HCPCS: Performed by: EMERGENCY MEDICINE

## 2020-07-21 PROCEDURE — 2580000003 HC RX 258: Performed by: EMERGENCY MEDICINE

## 2020-07-21 PROCEDURE — 6360000002 HC RX W HCPCS: Performed by: EMERGENCY MEDICINE

## 2020-07-21 PROCEDURE — 96375 TX/PRO/DX INJ NEW DRUG ADDON: CPT

## 2020-07-21 PROCEDURE — 96374 THER/PROPH/DIAG INJ IV PUSH: CPT

## 2020-07-21 PROCEDURE — 99284 EMERGENCY DEPT VISIT MOD MDM: CPT

## 2020-07-21 PROCEDURE — 6370000000 HC RX 637 (ALT 250 FOR IP): Performed by: EMERGENCY MEDICINE

## 2020-07-21 RX ORDER — CYCLOBENZAPRINE HCL 10 MG
10 TABLET ORAL NIGHTLY PRN
Qty: 20 TABLET | Refills: 0 | Status: SHIPPED | OUTPATIENT
Start: 2020-07-21 | End: 2020-07-31

## 2020-07-21 RX ORDER — FENTANYL CITRATE 50 UG/ML
50 INJECTION, SOLUTION INTRAMUSCULAR; INTRAVENOUS ONCE
Status: COMPLETED | OUTPATIENT
Start: 2020-07-21 | End: 2020-07-21

## 2020-07-21 RX ORDER — MAGNESIUM HYDROXIDE/ALUMINUM HYDROXICE/SIMETHICONE 120; 1200; 1200 MG/30ML; MG/30ML; MG/30ML
30 SUSPENSION ORAL ONCE
Status: COMPLETED | OUTPATIENT
Start: 2020-07-21 | End: 2020-07-21

## 2020-07-21 RX ORDER — OXYCODONE HYDROCHLORIDE AND ACETAMINOPHEN 5; 325 MG/1; MG/1
1 TABLET ORAL EVERY 6 HOURS PRN
Qty: 8 TABLET | Refills: 0 | Status: SHIPPED | OUTPATIENT
Start: 2020-07-21 | End: 2020-07-24

## 2020-07-21 RX ORDER — 0.9 % SODIUM CHLORIDE 0.9 %
1000 INTRAVENOUS SOLUTION INTRAVENOUS ONCE
Status: COMPLETED | OUTPATIENT
Start: 2020-07-21 | End: 2020-07-21

## 2020-07-21 RX ORDER — ONDANSETRON 2 MG/ML
4 INJECTION INTRAMUSCULAR; INTRAVENOUS ONCE
Status: COMPLETED | OUTPATIENT
Start: 2020-07-21 | End: 2020-07-21

## 2020-07-21 RX ORDER — TAMSULOSIN HYDROCHLORIDE 0.4 MG/1
0.4 CAPSULE ORAL DAILY
Qty: 5 CAPSULE | Refills: 0 | Status: SHIPPED | OUTPATIENT
Start: 2020-07-21 | End: 2021-03-22

## 2020-07-21 RX ORDER — OMEPRAZOLE 40 MG/1
40 CAPSULE, DELAYED RELEASE ORAL
Qty: 30 CAPSULE | Refills: 0 | Status: SHIPPED | OUTPATIENT
Start: 2020-07-21 | End: 2020-08-25

## 2020-07-21 RX ORDER — LIDOCAINE 50 MG/G
1 PATCH TOPICAL DAILY
Qty: 15 PATCH | Refills: 0 | Status: SHIPPED | OUTPATIENT
Start: 2020-07-21 | End: 2020-07-31

## 2020-07-21 RX ORDER — ORPHENADRINE CITRATE 30 MG/ML
60 INJECTION INTRAMUSCULAR; INTRAVENOUS ONCE
Status: COMPLETED | OUTPATIENT
Start: 2020-07-21 | End: 2020-07-21

## 2020-07-21 RX ORDER — OXYCODONE HYDROCHLORIDE AND ACETAMINOPHEN 5; 325 MG/1; MG/1
1 TABLET ORAL ONCE
Status: COMPLETED | OUTPATIENT
Start: 2020-07-21 | End: 2020-07-21

## 2020-07-21 RX ORDER — SENNA AND DOCUSATE SODIUM 50; 8.6 MG/1; MG/1
2 TABLET, FILM COATED ORAL DAILY
Qty: 60 TABLET | Refills: 0 | Status: SHIPPED | OUTPATIENT
Start: 2020-07-21 | End: 2021-03-22

## 2020-07-21 RX ORDER — LIDOCAINE HYDROCHLORIDE 20 MG/ML
15 SOLUTION OROPHARYNGEAL ONCE
Status: COMPLETED | OUTPATIENT
Start: 2020-07-21 | End: 2020-07-21

## 2020-07-21 RX ORDER — ALUMINA, MAGNESIA, AND SIMETHICONE 2400; 2400; 240 MG/30ML; MG/30ML; MG/30ML
30 SUSPENSION ORAL 3 TIMES DAILY PRN
Qty: 355 ML | Refills: 0 | Status: SHIPPED | OUTPATIENT
Start: 2020-07-21 | End: 2021-03-22

## 2020-07-21 RX ORDER — KETOROLAC TROMETHAMINE 30 MG/ML
30 INJECTION, SOLUTION INTRAMUSCULAR; INTRAVENOUS ONCE
Status: COMPLETED | OUTPATIENT
Start: 2020-07-21 | End: 2020-07-21

## 2020-07-21 RX ORDER — IBUPROFEN 800 MG/1
800 TABLET ORAL EVERY 8 HOURS PRN
Qty: 30 TABLET | Refills: 0 | Status: SHIPPED | OUTPATIENT
Start: 2020-07-21 | End: 2021-03-22

## 2020-07-21 RX ADMIN — ALUMINUM HYDROXIDE, MAGNESIUM HYDROXIDE, AND SIMETHICONE 30 ML: 200; 200; 20 SUSPENSION ORAL at 11:48

## 2020-07-21 RX ADMIN — OXYCODONE HYDROCHLORIDE AND ACETAMINOPHEN 1 TABLET: 5; 325 TABLET ORAL at 14:11

## 2020-07-21 RX ADMIN — ORPHENADRINE CITRATE 60 MG: 60 INJECTION INTRAMUSCULAR; INTRAVENOUS at 10:45

## 2020-07-21 RX ADMIN — FENTANYL CITRATE 50 MCG: 50 INJECTION INTRAMUSCULAR; INTRAVENOUS at 08:44

## 2020-07-21 RX ADMIN — KETOROLAC TROMETHAMINE 30 MG: 30 INJECTION, SOLUTION INTRAMUSCULAR at 08:43

## 2020-07-21 RX ADMIN — SODIUM CHLORIDE 1000 ML: 9 INJECTION, SOLUTION INTRAVENOUS at 08:46

## 2020-07-21 RX ADMIN — FAMOTIDINE 20 MG: 10 INJECTION, SOLUTION INTRAVENOUS at 08:43

## 2020-07-21 RX ADMIN — ONDANSETRON 4 MG: 2 INJECTION INTRAMUSCULAR; INTRAVENOUS at 08:43

## 2020-07-21 RX ADMIN — LIDOCAINE HYDROCHLORIDE 15 ML: 20 SOLUTION ORAL; TOPICAL at 11:48

## 2020-07-21 RX ADMIN — FENTANYL CITRATE 50 MCG: 50 INJECTION, SOLUTION INTRAMUSCULAR; INTRAVENOUS at 11:48

## 2020-07-21 ASSESSMENT — ENCOUNTER SYMPTOMS
DIARRHEA: 0
SORE THROAT: 0
SHORTNESS OF BREATH: 0
ABDOMINAL PAIN: 1
VOMITING: 0
COUGH: 0
BACK PAIN: 1
EYE PAIN: 0
NAUSEA: 1

## 2020-07-21 ASSESSMENT — PAIN SCALES - GENERAL
PAINLEVEL_OUTOF10: 10
PAINLEVEL_OUTOF10: 10
PAINLEVEL_OUTOF10: 8
PAINLEVEL_OUTOF10: 10

## 2020-07-21 ASSESSMENT — PAIN DESCRIPTION - PAIN TYPE: TYPE: ACUTE PAIN

## 2020-07-21 ASSESSMENT — PAIN DESCRIPTION - LOCATION: LOCATION: BACK

## 2020-07-21 ASSESSMENT — PAIN DESCRIPTION - ORIENTATION: ORIENTATION: RIGHT;LEFT;LOWER;INNER

## 2020-07-21 NOTE — ED PROVIDER NOTES
[facility]  ED  Emergency Department Encounter  EmergencyMedicine Resident     Pt Name:Xiomara Treadwell  MRN: [id number]  Lloydgfnicolas 1950  Date of evaluation: 7/21/20  PCP:  ZA Santoyo [id number]       Chief Complaint   Patient presents with    Back Pain     X SEVERAL DAYS       HISTORY OF PRESENT ILLNESS  (Location/Symptom, Timing/Onset, Context/Setting, Quality, Duration, Modifying Factors, Severity.)      Bang Francis is a 78 y.o. female who presents with worsening back pain. Patient presented yesterday with similar complaints she was given Norflex and was discharged home with Robaxin. Patient is representing today as her pain woke her from sleep again and is different than yesterday. Yesterday she reports she felt it more in the upper posterior thoracic region, it is now moved down towards the upper lumbar area as well as on the right side of the shoulder blade. She reports no fevers no chills. She does have nausea with the pain spiking, no emesis. No anterior chest pain cough or shortness of breath. She does have some epigastric abdominal tenderness and reports that she does have history of gastric ulcers. She is on Prevacid for that. She reports that when she her ulcers have acted up in the past he does present with back pain. She reports normal urination and defecation, no dysuria hematuria frequency or urgency. No blood noted in the stool. She is not on any blood thinners. No cardiac history. PAST MEDICAL / SURGICAL / SOCIAL / FAMILY HISTORY      has a past medical history of Arthritis and Hypertension. has a past surgical history that includes Cholecystectomy, laparoscopic (2012); Tubal ligation; Dilation and curettage of uterus (1982); Strabismus surgery (Bilateral); Hip Arthroplasty (Left); and pr total hip arthroplasty (Left, 11/14/2017).     Social History     Socioeconomic History    Marital status:      Spouse Yun Harrison MD   lidocaine (LIDODERM) 5 % Place 1 patch onto the skin daily for 10 days 12 hours on, 12 hours off. 7/21/20 7/31/20 Yes Kari Layton MD   ibuprofen (ADVIL;MOTRIN) 800 MG tablet Take 1 tablet by mouth every 8 hours as needed for Pain 7/21/20  Yes Kari Layton MD   aluminum & magnesium hydroxide-simethicone (MAALOX ADVANCED MAX ST) 459-599-41 MG/5ML SUSP Take 30 mLs by mouth 3 times daily as needed (heartburn/indigestion) 7/21/20  Yes Kari Layton MD   tamsulosin (FLOMAX) 0.4 MG capsule Take 1 capsule by mouth daily for 5 doses 7/21/20 7/26/20 Yes Kari Layton MD   omeprazole (PRILOSEC) 40 MG delayed release capsule Take 1 capsule by mouth every morning (before breakfast) 7/21/20  Yes Kari Layton MD   sennosides-docusate sodium (SENOKOT-S) 8.6-50 MG tablet Take 2 tablets by mouth daily 7/21/20  Yes Kari Layton MD   lisinopril-hydroCHLOROthiazide (PRINZIDE;ZESTORETIC) 20-12.5 MG per tablet Take one tablet by mouth daily 3/9/20   [facility], APRN - CNP   Calcium Carb-Cholecalciferol (CALCIUM-VITAMIN D) 600-400 MG-UNIT TABS Take 1 Dose by mouth daily 12/11/17   [facility], APRN - CNP   aspirin 81 MG EC tablet Take 1 tablet by mouth 2 times daily 11/15/17 3/9/20  Jessica Elizabeth,        REVIEW OF SYSTEMS    (2-9 systems for level 4, 10 or more for level 5)      Review of Systems   Constitutional: Negative for activity change, appetite change and fever. HENT: Negative for congestion and sore throat. Eyes: Negative for pain and visual disturbance. Respiratory: Negative for cough and shortness of breath. Cardiovascular: Negative for chest pain and leg swelling. Gastrointestinal: Positive for abdominal pain and nausea. Negative for diarrhea and vomiting. Endocrine: Negative for polyphagia and polyuria. Genitourinary: Negative for dysuria, frequency, hematuria, vaginal bleeding and vaginal discharge.    Musculoskeletal: Positive for arthralgias, back pain and myalgias. Skin: Negative for rash and wound. Allergic/Immunologic: Negative for environmental allergies and food allergies. Neurological: Negative for syncope and weakness. Hematological: Negative for adenopathy. Does not bruise/bleed easily. Psychiatric/Behavioral: Negative for confusion. The patient is not nervous/anxious. PHYSICAL EXAM   (up to 7 for level 4, 8 or more for level 5)      INITIAL VITALS:   /80   Pulse 71   Temp 97.6 °F (36.4 °C) (Oral)   Resp 15   Ht 5' 6\" (1.676 m)   SpO2 97%   BMI 29.05 kg/m²     Physical Exam  Constitutional:       General: She is not in acute distress. Appearance: She is well-developed. HENT:      Head: Normocephalic and atraumatic. Eyes:      Conjunctiva/sclera: Conjunctivae normal.      Pupils: Pupils are equal, round, and reactive to light. Neck:      Musculoskeletal: Normal range of motion and neck supple. Cardiovascular:      Rate and Rhythm: Normal rate and regular rhythm. Heart sounds: Normal heart sounds. No murmur. No friction rub. No gallop. Pulmonary:      Effort: Pulmonary effort is normal. No respiratory distress. Breath sounds: Normal breath sounds. No wheezing, rhonchi or rales. Abdominal:      General: Bowel sounds are normal. There is no distension. Palpations: Abdomen is soft. Tenderness: There is abdominal tenderness in the epigastric area. There is no right CVA tenderness, left CVA tenderness, guarding or rebound. Musculoskeletal:      Cervical back: She exhibits normal range of motion, no tenderness and no bony tenderness. Thoracic back: She exhibits decreased range of motion and tenderness. She exhibits no bony tenderness. Lumbar back: She exhibits decreased range of motion and tenderness. She exhibits no bony tenderness. Back:    Skin:     General: Skin is warm and dry. Findings: No rash.    Neurological:      Mental Status: She is alert and magnesium hydroxide-simethicone (MAALOX ADVANCED MAX ST) 036-887-44 MG/5ML SUSP     Sig: Take 30 mLs by mouth 3 times daily as needed (heartburn/indigestion)     Dispense:  355 mL     Refill:  0    tamsulosin (FLOMAX) 0.4 MG capsule     Sig: Take 1 capsule by mouth daily for 5 doses     Dispense:  5 capsule     Refill:  0    omeprazole (PRILOSEC) 40 MG delayed release capsule     Sig: Take 1 capsule by mouth every morning (before breakfast)     Dispense:  30 capsule     Refill:  0    sennosides-docusate sodium (SENOKOT-S) 8.6-50 MG tablet     Sig: Take 2 tablets by mouth daily     Dispense:  60 tablet     Refill:  0       DIAGNOSTIC RESULTS / EMERGENCY DEPARTMENT COURSE / MDM     LABS:  Results for orders placed or performed during the hospital encounter of 07/21/20   CBC WITH AUTO DIFFERENTIAL   Result Value Ref Range    WBC 6.7 3.5 - 11.3 k/uL    RBC 5.60 (H) 3.95 - 5.11 m/uL    Hemoglobin 15.6 (H) 11.9 - 15.1 g/dL    Hematocrit 48.0 (H) 36.3 - 47.1 %    MCV 85.7 82.6 - 102.9 fL    MCH 27.9 25.2 - 33.5 pg    MCHC 32.5 28.4 - 34.8 g/dL    RDW 14.3 11.8 - 14.4 %    Platelets 735 726 - 274 k/uL    MPV 11.2 8.1 - 13.5 fL    NRBC Automated 0.0 0.0 per 100 WBC    Differential Type NOT REPORTED     Seg Neutrophils 70 (H) 36 - 65 %    Lymphocytes 21 (L) 24 - 43 %    Monocytes 6 3 - 12 %    Eosinophils % 1 1 - 4 %    Basophils 1 0 - 2 %    Immature Granulocytes 1 (H) 0 %    Segs Absolute 4.78 1.50 - 8.10 k/uL    Absolute Lymph # 1.37 1.10 - 3.70 k/uL    Absolute Mono # 0.42 0.10 - 1.20 k/uL    Absolute Eos # 0.03 0.00 - 0.44 k/uL    Basophils Absolute 0.03 0.00 - 0.20 k/uL    Absolute Immature Granulocyte 0.03 0.00 - 0.30 k/uL    WBC Morphology NOT REPORTED     RBC Morphology NOT REPORTED     Platelet Estimate NOT REPORTED    Comprehensive Metabolic Panel   Result Value Ref Range    Glucose 115 (H) 70 - 99 mg/dL    BUN 19 8 - 23 mg/dL    CREATININE 0.81 0.50 - 0.90 mg/dL    Bun/Cre Ratio NOT REPORTED 9 - 20    Calcium 10.1 8.6 - 10.4 mg/dL    Sodium 136 135 - 144 mmol/L    Potassium 4.3 3.7 - 5.3 mmol/L    Chloride 100 98 - 107 mmol/L    CO2 23 20 - 31 mmol/L    Anion Gap 13 9 - 17 mmol/L    Alkaline Phosphatase 103 35 - 104 U/L    ALT 23 5 - 33 U/L    AST 32 (H) <32 U/L    Total Bilirubin 0.45 0.3 - 1.2 mg/dL    Total Protein 7.3 6.4 - 8.3 g/dL    Alb 4.2 3.5 - 5.2 g/dL    Albumin/Globulin Ratio 1.4 1.0 - 2.5    GFR Non-African American >60 >60 mL/min    GFR African American >60 >60 mL/min    GFR Comment          GFR Staging NOT REPORTED    Troponin   Result Value Ref Range    Troponin, High Sensitivity 8 0 - 14 ng/L    Troponin T NOT REPORTED <0.03 ng/mL    Troponin Interp NOT REPORTED    Urinalysis Reflex to Culture    Specimen: Urine, clean catch   Result Value Ref Range    Color, UA YELLOW YELLOW    Turbidity UA CLEAR CLEAR    Glucose, Ur NEGATIVE NEGATIVE    Bilirubin Urine NEGATIVE NEGATIVE    Ketones, Urine NEGATIVE NEGATIVE    Specific Gravity, UA 1.011 1.005 - 1.030    Urine Hgb NEGATIVE NEGATIVE    pH, UA 7.0 5.0 - 8.0    Protein, UA NEGATIVE NEGATIVE    Urobilinogen, Urine Normal Normal    Nitrite, Urine NEGATIVE NEGATIVE    Leukocyte Esterase, Urine NEGATIVE NEGATIVE    Urinalysis Comments       Microscopic exam not performed based on chemical results unless requested in original order.    LIPASE   Result Value Ref Range    Lipase 29 13 - 60 U/L   Troponin   Result Value Ref Range    Troponin, High Sensitivity 11 0 - 14 ng/L    Troponin T NOT REPORTED <0.03 ng/mL    Troponin Interp NOT REPORTED    EKG 12 Lead   Result Value Ref Range    Ventricular Rate 65 BPM    Atrial Rate 65 BPM    P-R Interval 150 ms    QRS Duration 78 ms    Q-T Interval 388 ms    QTc Calculation (Bazett) 403 ms    P Axis 70 degrees    R Axis 24 degrees    T Axis 21 degrees       RADIOLOGY:  Ct Abdomen Pelvis Wo Contrast Additional Contrast? None    Result Date: 7/21/2020  EXAMINATION: CT OF THE ABDOMEN AND PELVIS WITHOUT CONTRAST 7/21/2020 1:35 pm TECHNIQUE: CT of the abdomen and pelvis was performed without the administration of intravenous contrast. Multiplanar reformatted images are provided for review. Dose modulation, iterative reconstruction, and/or weight based adjustment of the mA/kV was utilized to reduce the radiation dose to as low as reasonably achievable. COMPARISON: None. HISTORY: ORDERING SYSTEM PROVIDED HISTORY: abdominal and bilateral flank pain TECHNOLOGIST PROVIDED HISTORY: abdominal and bilateral flank pain Reason for Exam: abdominal and bilateral flank pain Acuity: Unknown Type of Exam: Unknown 71-year-old female with bilateral flank pain FINDINGS: Lower Chest: Mild bibasilar atelectasis, scarring, parenchymal banding and respiratory motion. No free intra-abdominal air. Trace pericardial fluid. Organs: Prior cholecystectomy. Liver, spleen, pancreas grossly unremarkable in appearance on limited noncontrast imaging. Mild bilateral perinephric stranding. Bilateral nonobstructing renal calculi. No obstructing calculus, hydronephrosis or hydroureter. Adreniform enlargement of the adrenal glands which can be seen with adrenal hyperplasia. Prior cholecystectomy. GI/Bowel: Nonspecific lesion with peripheral calcification measuring 1.5 cm along the anterior head of the pancreas on image 75, series 2. This is likely benign given a well-defined peripheral rim of calcification. Mild stool burden. Mild colonic diverticulosis. No significant dilatation of small bowel loops to suggest small bowel obstruction. Normal gas-filled appendix. Pelvis: Pelvic phleboliths. Slight distention of the urinary bladder. Uterus and adnexa grossly unremarkable on limited noncontrast imaging. No free fluid in the pelvis. No inguinal or pelvic sidewall lymphadenopathy. Peritoneum/Retroperitoneum: Abdominal aorta normal in appearance and caliber on noncontrast imaging. Psoas muscles normal in size and symmetric in appearance.   No retroperitoneal lymphadenopathy. Bones/Soft Tissues: Streak artifact related to left hip arthroplasty hardware which limits evaluation of the pelvis. 9 mm anterolisthesis of L4 on L5. Severe disc space narrowing and endplate degenerative changes at L4-L5. Moderate levoscoliosis of the lumbar spine. 1. Bilateral nonobstructing renal calculi and perinephric stranding. No obstructing calculus or hydronephrosis. 2. Prior cholecystectomy. Normal appendix. 3. Mild stool burden. Left colonic diverticulosis. 4. Left hip arthroplasty hardware and streak artifact which limits evaluation of the pelvis. 5. Probable adrenal hyperplasia. 6. Trace pericardial fluid. 7. 9 mm anterolisthesis and severe degenerative changes at L4-L5. Xr Chest Portable    Result Date: 7/21/2020  EXAMINATION: ONE XRAY VIEW OF THE CHEST 7/21/2020 9:12 am COMPARISON: Two-view chest from 11/08/2017 HISTORY: ORDERING SYSTEM PROVIDED HISTORY: posterior chest pain TECHNOLOGIST PROVIDED HISTORY: posterior chest pain Reason for Exam: Back pain History of hypertension FINDINGS: Overlying ECG monitor leads and brassiere related metal clasps. Cardiomediastinal shadow unchanged, again with elongation descending thoracic aorta, especially near the diaphragm. No cardiomegaly. No localized pulmonary opacity or blunting of the costophrenic angles. Mild-moderate convex-right curvature and moderate degenerative changes thoracic spine. No acute cardiopulmonary disease. EKG  None    All EKG's are interpreted by the Emergency Department Physician who either signs or Co-signs this chart in the absence of a cardiologist.    EMERGENCY DEPARTMENT COURSE:  Patient seen and evaluated. She is sitting in the bed somewhat stiffly appears to be uncomfortable however is nontoxic-appearing. On examination, she is a regular rate and rhythm, lungs are clear to auscultation bilaterally.   She has no reproducible chest pain anteriorly, she does have some moderate epigastric tenderness to with minimal distention, no other abdominal findings. She is neurovascularly intact. She does have some tenderness to palpation in the upper thoracic paraspinal area more so on the right as well as paraspinal tenderness in the lumbar region as well no midline tenderness noted. Basic labs ordered including CBC, CMP, lipase, urinalysis, troponins, EKG chest x-ray. Patient given IV fluids, antacids, antiemetics, pain medication. On review of the laboratory values, there are no acute abnormal findings. Discussed the results with the patient and treatment plan. Patient requesting something additional for her muscle spasms. Norflex was given. On reevaluation of the patient, again she reports that she has no improvement in her pain and now feels its worse running up and down the right side of her back the groin area as well. CT of the abdomen and pelvis without contrast ordered. On review of the imaging there are findings of some slight perinephric stranding bilaterally as well as some slight hydronephrosis and renal pelviectasis perhaps indicating a recent passage of stone as well as several nonobstructing stones within both kidneys. There is also findings of a 9 mm anterolisthesis and severe degenerative disc disease in the lumbar spine. Discussed the findings with the patient. She reported some good symptom improvement following fentanyl and now p.o. Norco.  She reported that she felt better and would like to be discharged home. She voiced understanding of the findings. Plan for discharge home with symptom control medications and follow-up with her PCP. Patient stable and ready for discharge home. PROCEDURES:  None    CONSULTS:  None    CRITICAL CARE:  None    FINAL IMPRESSION      1. Acute gastritis without hemorrhage, unspecified gastritis type    2. Back strain, initial encounter    3. Back muscle spasm    4. Kidney stone    5.  Degenerative disc disease, lumbar          DISPOSITION / PLAN     DISPOSITION    Decision to discharge    PATIENT REFERRED TO:  Mik Del Angel, APRN - [facility]  [address] [place] [address]  [phone]    Call in 2 days  As needed, If symptoms worsen      DISCHARGE MEDICATIONS:  Discharge Medication List as of 7/21/2020  2:11 PM      START taking these medications    Details   oxyCODONE-acetaminophen (PERCOCET) 5-325 MG per tablet Take 1 tablet by mouth every 6 hours as needed for Pain for up to 3 days. Intended supply: 3 days.  Take lowest dose possible to manage pain, Disp-8 tablet,R-0Print      cyclobenzaprine (FLEXERIL) 10 MG tablet Take 1 tablet by mouth nightly as needed for Muscle spasms, Disp-20 tablet,R-0Print      lidocaine (LIDODERM) 5 % Place 1 patch onto the skin daily for 10 days 12 hours on, 12 hours off., Disp-15 patch,R-0Print      ibuprofen (ADVIL;MOTRIN) 800 MG tablet Take 1 tablet by mouth every 8 hours as needed for Pain, Disp-30 tablet,R-0Print      aluminum & magnesium hydroxide-simethicone (MAALOX ADVANCED MAX ST) 400-400-40 MG/5ML SUSP Take 30 mLs by mouth 3 times daily as needed (heartburn/indigestion), Disp-355 mL,R-0Print      tamsulosin (FLOMAX) 0.4 MG capsule Take 1 capsule by mouth daily for 5 doses, Disp-5 capsule,R-0Print      omeprazole (PRILOSEC) 40 MG delayed release capsule Take 1 capsule by mouth every morning (before breakfast), Disp-30 capsule,R-0Print             Pema Yost MD  Emergency Medicine Resident    (Please note that portions of thisnote were completed with a voice recognition program.  Efforts were made to edit the dictations but occasionally words are mis-transcribed.)       Pema Yost MD  Resident  07/22/20 [id number]

## 2020-07-21 NOTE — ED PROVIDER NOTES
[address] [facility]     Emergency Department     Faculty Attestation    I performed a history and physical examination of the patient and discussed management with the resident. I reviewed the residents note and agree with the documented findings and plan of care. Any areas of disagreement are noted on the chart. I was personally present for the key portions of any procedures. I have documented in the chart those procedures where I was not present during the key portions. I have reviewed the emergency nurses triage note. I agree with the chief complaint, past medical history, past surgical history, allergies, medications, social and family history as documented unless otherwise noted below. For Physician Assistant/ Nurse Practitioner cases/documentation I have personally evaluated this patient and have completed at least one if not all key elements of the E/M (history, physical exam, and MDM). Additional findings are as noted. Patient presents with back pain that she is had for the past couple of days. She was seen here yesterday for the back pain. She says that yesterday was more in the middle of her back but now it is spread down lower and up higher. She denies any injury or fall. She denies weakness, numbness, or tingling to her extremities. She says she is having some epigastric abdominal pain as well. She denies chest pain or shortness of breath. She denies fever or chills. Patient did not have any lab testing or imaging done yesterday and was treated for muscle spasm. She says that the medicine given to her yesterday does not seem to be helping much. She says she did contact her primary care physician's office but they had not yet called her back. She denies any history of cancer or IV drug abuse. She denies any dysuria or hematuria or difficulty using the restroom. On exam, patient is lying in the bed and appears mildly uncomfortable.   Lungs are clear to auscultation bilaterally

## 2020-07-21 NOTE — ED TRIAGE NOTES
Patient arrived to unit with family. Patient was seen in ED yesterday for back pain. Upon discharge patient attempted to call her PCP for follow up today. Patient left message and no return call was made. Patient reports pain is now different and is worse than before.

## 2020-07-21 NOTE — ED NOTES
Patient and visitor given [address] [place] [address].  Patient reports that she noted swelling in abdominal area.       Clarissa Farias RN  07/21/20 [id number]

## 2020-07-22 LAB
EKG ATRIAL RATE: 65 BPM
EKG P AXIS: 70 DEGREES
EKG P-R INTERVAL: 150 MS
EKG Q-T INTERVAL: 388 MS
EKG QRS DURATION: 78 MS
EKG QTC CALCULATION (BAZETT): 403 MS
EKG R AXIS: 24 DEGREES
EKG T AXIS: 21 DEGREES
EKG VENTRICULAR RATE: 65 BPM

## 2020-07-22 PROCEDURE — 93010 ELECTROCARDIOGRAM REPORT: CPT | Performed by: INTERNAL MEDICINE

## 2020-07-22 NOTE — TELEPHONE ENCOUNTER
Aster Santos went to [facility] ER on 07/21/2020.  She is asking for you to review reports, labs and imaging

## 2020-08-03 ENCOUNTER — TELEPHONE (OUTPATIENT)
Dept: FAMILY MEDICINE CLINIC | Age: 70
End: 2020-08-03

## 2020-08-03 NOTE — TELEPHONE ENCOUNTER
Pt is calling due to having RT flank pain. She states has been to the ER 2 times now. She states is kidney stones. She states today there is blood in urine. She wants to know what she should do now.   She has not seen [facility] in the past.

## 2020-08-03 NOTE — TELEPHONE ENCOUNTER
Pt states the pain is in her back and it has been having her doubled over. She did take a half of Percocet today.  The pain is usually on the LT but now is on the RT side in the back rib area

## 2020-08-04 ENCOUNTER — HOSPITAL ENCOUNTER (OUTPATIENT)
Age: 70
Setting detail: SPECIMEN
Discharge: HOME | End: 2020-08-04
Payer: MEDICARE

## 2020-08-04 ENCOUNTER — NURSE ONLY (OUTPATIENT)
Dept: FAMILY MEDICINE CLINIC | Age: 70
End: 2020-08-04
Payer: MEDICARE

## 2020-08-04 LAB
BILIRUBIN, POC: NORMAL
BLOOD URINE, POC: NORMAL
CLARITY, POC: CLEAR
COLOR, POC: YELLOW
GLUCOSE URINE, POC: NORMAL
KETONES, POC: NORMAL
LEUKOCYTE EST, POC: NORMAL
NITRITE, POC: NORMAL
PH, POC: 6
PROTEIN, POC: NORMAL
SPECIFIC GRAVITY, POC: 1.01
UROBILINOGEN, POC: 0.2

## 2020-08-04 PROCEDURE — 81002 URINALYSIS NONAUTO W/O SCOPE: CPT | Performed by: INTERNAL MEDICINE

## 2020-08-05 LAB
CULTURE: NORMAL
Lab: NORMAL
SPECIMEN DESCRIPTION: NORMAL

## 2020-08-05 RX ORDER — TAMSULOSIN HYDROCHLORIDE 0.4 MG/1
0.4 CAPSULE ORAL DAILY
Qty: 14 CAPSULE | Refills: 0 | Status: SHIPPED | OUTPATIENT
Start: 2020-08-05 | End: 2020-09-14 | Stop reason: SDUPTHER

## 2020-08-17 ENCOUNTER — OFFICE VISIT (OUTPATIENT)
Dept: UROLOGY | Age: 70
End: 2020-08-17
Payer: MEDICARE

## 2020-08-17 VITALS — TEMPERATURE: 97.7 F

## 2020-08-17 PROCEDURE — 1123F ACP DISCUSS/DSCN MKR DOCD: CPT | Performed by: UROLOGY

## 2020-08-17 PROCEDURE — 3017F COLORECTAL CA SCREEN DOC REV: CPT | Performed by: UROLOGY

## 2020-08-17 PROCEDURE — 99204 OFFICE O/P NEW MOD 45 MIN: CPT | Performed by: UROLOGY

## 2020-08-17 PROCEDURE — G8417 CALC BMI ABV UP PARAM F/U: HCPCS | Performed by: UROLOGY

## 2020-08-17 PROCEDURE — 1036F TOBACCO NON-USER: CPT | Performed by: UROLOGY

## 2020-08-17 PROCEDURE — G8399 PT W/DXA RESULTS DOCUMENT: HCPCS | Performed by: UROLOGY

## 2020-08-17 PROCEDURE — G8427 DOCREV CUR MEDS BY ELIG CLIN: HCPCS | Performed by: UROLOGY

## 2020-08-17 PROCEDURE — 1090F PRES/ABSN URINE INCON ASSESS: CPT | Performed by: UROLOGY

## 2020-08-17 PROCEDURE — 4040F PNEUMOC VAC/ADMIN/RCVD: CPT | Performed by: UROLOGY

## 2020-08-17 RX ORDER — TAMSULOSIN HYDROCHLORIDE 0.4 MG/1
CAPSULE ORAL
Qty: 14 CAPSULE | Refills: 0 | OUTPATIENT
Start: 2020-08-17

## 2020-08-17 RX ORDER — TAMSULOSIN HYDROCHLORIDE 0.4 MG/1
0.4 CAPSULE ORAL DAILY
Qty: 30 CAPSULE | Refills: 0 | Status: SHIPPED | OUTPATIENT
Start: 2020-08-17 | End: 2020-09-14

## 2020-08-17 ASSESSMENT — ENCOUNTER SYMPTOMS
WHEEZING: 0
ABDOMINAL PAIN: 0
NAUSEA: 0
BACK PAIN: 0
EYE PAIN: 0
EYE REDNESS: 0
SHORTNESS OF BREATH: 0
DIARRHEA: 0
VOMITING: 0
CONSTIPATION: 0
COUGH: 0

## 2020-08-17 NOTE — PROGRESS NOTES
Review of Systems   Constitutional: Negative for appetite change, chills and fever. Eyes: Negative for pain, redness and visual disturbance. Respiratory: Negative for cough, shortness of breath and wheezing. Cardiovascular: Negative for chest pain and leg swelling. Gastrointestinal: Negative for abdominal pain, constipation, diarrhea, nausea and vomiting. Genitourinary: Positive for difficulty urinating (slow stream) and hematuria. Negative for dysuria, flank pain, frequency and urgency. Musculoskeletal: Negative for back pain, joint swelling and myalgias. Skin: Negative for rash and wound. Neurological: Negative for dizziness, tremors and numbness. Hematological: Does not bruise/bleed easily.

## 2020-08-17 NOTE — PROGRESS NOTES
[address] [address]  Dept: [address] [facility] Urology Office Note - New Patient    Patient:  Lisa Boudreaux  YOB: 1950  Date: 8/17/2020    The patient is a 78 y.o. female who presentstoday for evaluation of the following problems:   Chief Complaint   Patient presents with    New Patient    Follow-Up from [facility]     referred by Carles Jeans, APRN - CNP. HPI  Kidney calculus:  Patient is here today for a kidney calculus which was first noted afew week(s) ago. Location: Bilateral upper and lower pole  Size: multiple mm  Recently the renal calculus symptoms: are improving  Current medical Rx for renal calculus: none    Flank pain? No  Had pain in bilateral flanks but this has subsided    Hematuria? gross  Passed recent calculus? No  Personal History of Kidney Stones: no   Stone composition: unknown  Family History of Kidney Stones: no    Pt has weak stream.  Started a few months ago. Got better with flomax. Now voiding better on meds. Lab Results   Component Value Date    CALCIUM 10.1 07/21/2020     Lab Results   Component Value Date     07/21/2020    K 4.3 07/21/2020     07/21/2020    CO2 23 07/21/2020         (Patient's old records have been requested, reviewed and summarized in today's note.)    Summary of old records: N/A    History: N/A    ProceduresToday: N/A    Urinalysis today:  No results found for this visit on 08/17/20.     AUA Symptom Score (8/17/2020):  INCOMPLETE EMPTYING: How often have you had the sensation of not emptying your bladder?: Not at all  FREQUENCY: How often do you have to urinate less than every two hours?: Not at all  INTERMITTENCY: How often have you found you stopped and started again several times when you urinated?: Not at all  URGENCY: How often have you found it difficult to postpone urination?: Not at all  WEAK STREAM: How often have you had a weak urinary stream?: About Half the time  STRAINING: How often have you had to strain to start  urination?: Not at all  NOCTURIA: How many times did you typically get up at night to uriniate?: NONE  TOTAL I-PSS SCORE[de-identified] 3  How would you feel if you were to spend the rest of your life with your urinary condition?: Mostly Satisfied    Last BUN andcreatinine:  Lab Results   Component Value Date    BUN 19 07/21/2020     Lab Results   Component Value Date    CREATININE 0.81 07/21/2020       Additional Lab/Culture results: none    Reviewed during this Office Visit: ct bilateral non obstructing stones  (results were independently reviewed byphysician and radiology report verified)    PAST MEDICAL, FAMILY AND SOCIAL HISTORY:  Past Medical History:   Diagnosis Date    Arthritis     osteoarthritis    Hypertension      Past Surgical History:   Procedure Laterality Date    CHOLECYSTECTOMY, LAPAROSCOPIC  2012    DILATION AND CURETTAGE OF UTERUS  1982    HIP ARTHROPLASTY Left     [place] TOTAL HIP ARTHROPLASTY Left 11/14/2017    HIP TOTAL ARTHROPLASTY ANTERIOR APPROACH (MEDACTA, FASCIA ILIACA BLOCK VS. LUMBAR PLEXUS PRE-OP, SPINAL VS. GENERAL, MEDACTA TABLE, C-ARM) performed by Glenna Go DO at [address] [place] [address] Bilateral     as child (lazy eye?? )    TUBAL LIGATION       Family History   Problem Relation Age of Onset    Heart Disease Mother     Diabetes Father     Diabetes Brother      Outpatient Medications Marked as Taking for the 8/17/20 encounter (Office Visit) with Luciano Reddy MD   Medication Sig Dispense Refill    tamsulosin (FLOMAX) 0.4 MG capsule Take 1 capsule by mouth daily Take one capsule daily to facilitate passage of ureteral stone 30 capsule 0    tamsulosin (FLOMAX) 0.4 MG capsule Take 1 capsule by mouth daily 14 capsule 0    ibuprofen (ADVIL;MOTRIN) 800 MG tablet Take 1 tablet by mouth every 8 hours as needed for Pain 30 tablet 0    aluminum & magnesium hydroxide-simethicone (MAALOX ADVANCED MAX ST) 400-400-40 MG/5ML SUSP Take 30 mLs by mouth 3 times daily as needed (heartburn/indigestion) 355 mL 0    omeprazole (PRILOSEC) 40 MG delayed release capsule Take 1 capsule by mouth every morning (before breakfast) 30 capsule 0    sennosides-docusate sodium (SENOKOT-S) 8.6-50 MG tablet Take 2 tablets by mouth daily 60 tablet 0    lisinopril-hydroCHLOROthiazide (PRINZIDE;ZESTORETIC) 20-12.5 MG per tablet Take one tablet by mouth daily 90 tablet 1    Calcium Carb-Cholecalciferol (CALCIUM-VITAMIN D) 600-400 MG-UNIT TABS Take 1 Dose by mouth daily 30 tablet 5       Allopurinol and Morphine  Social History     Tobacco Use   Smoking Status Never Smoker   Smokeless Tobacco Never Used      (If patient a smoker, smoking cessation counseling offered)   Social History     Substance and Sexual Activity   Alcohol Use Yes    Comment: 1 glass per month       REVIEW OF SYSTEMS:  Review of Systems    Physical Exam:    This a 78 y.o. female      Vitals:    08/17/20 0801   Temp: 97.7 °F (36.5 °C)     There is no height or weight on file to calculate BMI. Physical Exam  Constitutional: Patient in no acute distress, ggod grooming, appropriately dressed  Neuro: Alert and oriented to person, place and time. Psych:Mood normal, affect normal  Skin: No rash noted  HEENT: Head: Normocephalic and atraumatic,Conjunctivae and EOM are normal,Nose- normal, Right/Left External Ear: normal, Mouth: Mucosa Moist  Neck: Supple  Lungs: Respiratory effort is normal  Cardiovascular: strong and regular, no lower leg edema  Abdomen: Soft, non-tender, non-distended with no CVA,    Lymphatics: No cervical palpable lymphadenopathy. Bladder non-tender and not distended. Musculoskeletal: Normal gait and station        Assessment and Plan      1. Flank pain    2. Kidney stone    3.  Weak urinary stream            Plan:   flomax x 1 month for weaks stream  F/u 6 wks kub       Prescriptions Ordered:  Orders Placed This Encounter   Medications    tamsulosin (FLOMAX) 0.4 MG capsule     Sig: Take 1 capsule by mouth daily Take one capsule daily to facilitate passage of ureteral stone     Dispense:  30 capsule     Refill:  0      Orders Placed:  Orders Placed This Encounter   Procedures    XR ABDOMEN (KUB) (SINGLE AP VIEW)     Standing Status:   Future     Standing Expiration Date:   8/17/2021     Order Specific Question:   Reason for exam:     Answer:   kidney stones            Lavinia Arrington MD    Agree with the ROS entered by the MA.

## 2020-08-25 RX ORDER — OMEPRAZOLE 40 MG/1
CAPSULE, DELAYED RELEASE ORAL
Qty: 30 CAPSULE | Refills: 5 | Status: SHIPPED | OUTPATIENT
Start: 2020-08-25

## 2020-08-25 NOTE — TELEPHONE ENCOUNTER
Last visit: 3/9/2020  Last Med refill: 7/21/2020  Does patient have enough medication for 72 hours: No:     Next Visit Date:  Future Appointments   Date Time Provider Jeovanny Olivo   9/28/2020  8:50 AM Kyung Melgoza MD . C [facility]   10/15/2020  8:00 AM ZA Carlton - CNP [facility] FP [address] Maintenance   Topic Date Due    A1C test (Diabetic or Prediabetic)  02/11/2020    Shingles Vaccine (2 of 2) 04/24/2020    Flu vaccine (1) 09/01/2020    Annual Wellness Visit (AWV)  10/14/2020    Potassium monitoring  07/21/2021    Creatinine monitoring  07/21/2021    Breast cancer screen  10/24/2021    Lipid screen  02/22/2024    DTaP/Tdap/Td vaccine (2 - Td) 08/21/2027    Colon cancer screen colonoscopy  02/28/2029    DEXA (modify frequency per FRAX score)  Completed    Pneumococcal 65+ years Vaccine  Completed    Hepatitis C screen  Completed    Hepatitis A vaccine  Aged Out    Hepatitis B vaccine  Aged Out    Hib vaccine  Aged Out    Meningococcal (ACWY) vaccine  Aged Out       Hemoglobin A1C (%)   Date Value   02/11/2019 5.9   08/31/2017 5.7             ( goal A1C is < 7)   No results found for: LABMICR  LDL Cholesterol (mg/dL)   Date Value   02/22/2019 56   02/01/2018 75       (goal LDL is <100)   AST (U/L)   Date Value   07/21/2020 32 (H)     ALT (U/L)   Date Value   07/21/2020 23     BUN (mg/dL)   Date Value   07/21/2020 19     BP Readings from Last 3 Encounters:   07/21/20 137/80   07/20/20 (!) 159/94   03/09/20 122/70          (goal 120/80)    All Future Testing planned in [facility]  Lab Frequency Next Occurrence   CBC Once 09/09/2020   Comprehensive Metabolic Panel Once 69/61/2114   Lipid, Fasting Once 09/09/2020   Hemoglobin A1C Once 09/09/2020   Echocardiogram complete Once 08/23/2020   XR ABDOMEN (KUB) (SINGLE AP VIEW) Once 08/17/2020               Patient Active Problem List:     Essential hypertension     Osteopenia     Status post total replacement of left

## 2020-09-14 RX ORDER — TAMSULOSIN HYDROCHLORIDE 0.4 MG/1
CAPSULE ORAL
Qty: 30 CAPSULE | Refills: 0 | Status: SHIPPED | OUTPATIENT
Start: 2020-09-14 | End: 2021-03-22

## 2020-09-14 NOTE — TELEPHONE ENCOUNTER
Refill request received for tamsulosin. Pt was last seen on 8/17/20 and will return 9/28/20 with KUB. Ok to refill if stone hasn't passed yet, per Dr Milo Bill.

## 2020-09-15 ENCOUNTER — HOSPITAL ENCOUNTER (OUTPATIENT)
Dept: NON INVASIVE DIAGNOSTICS | Age: 70
Discharge: HOME | End: 2020-09-15
Payer: MEDICARE

## 2020-09-15 LAB
LV EF: 55 %
LVEF MODALITY: NORMAL

## 2020-09-15 PROCEDURE — 93306 TTE W/DOPPLER COMPLETE: CPT

## 2020-09-18 ENCOUNTER — TELEPHONE (OUTPATIENT)
Dept: FAMILY MEDICINE CLINIC | Age: 70
End: 2020-09-18

## 2020-09-18 NOTE — TELEPHONE ENCOUNTER
Patient called asking for results of echo. Informed patient provider is out of the office on Monday. Patient asked that a message me sent.

## 2020-09-22 ENCOUNTER — HOSPITAL ENCOUNTER (OUTPATIENT)
Dept: GENERAL RADIOLOGY | Facility: CLINIC | Age: 70
Discharge: HOME | End: 2020-09-24
Payer: MEDICARE

## 2020-09-22 ENCOUNTER — HOSPITAL ENCOUNTER (OUTPATIENT)
Facility: CLINIC | Age: 70
End: 2020-09-22
Payer: MEDICARE

## 2020-09-22 PROCEDURE — 74018 RADEX ABDOMEN 1 VIEW: CPT

## 2020-09-24 ENCOUNTER — TELEPHONE (OUTPATIENT)
Dept: FAMILY MEDICINE CLINIC | Age: 70
End: 2020-09-24

## 2020-09-24 NOTE — TELEPHONE ENCOUNTER
Patient states her dermatologist would like her to have her vit d and thyroid checked. Patient would like to know if these can be ordered.

## 2020-09-28 ENCOUNTER — OFFICE VISIT (OUTPATIENT)
Dept: UROLOGY | Age: 70
End: 2020-09-28
Payer: MEDICARE

## 2020-09-28 VITALS — HEART RATE: 74 BPM | DIASTOLIC BLOOD PRESSURE: 83 MMHG | SYSTOLIC BLOOD PRESSURE: 122 MMHG | TEMPERATURE: 98.1 F

## 2020-09-28 PROCEDURE — G8399 PT W/DXA RESULTS DOCUMENT: HCPCS | Performed by: UROLOGY

## 2020-09-28 PROCEDURE — 1036F TOBACCO NON-USER: CPT | Performed by: UROLOGY

## 2020-09-28 PROCEDURE — G8417 CALC BMI ABV UP PARAM F/U: HCPCS | Performed by: UROLOGY

## 2020-09-28 PROCEDURE — 4040F PNEUMOC VAC/ADMIN/RCVD: CPT | Performed by: UROLOGY

## 2020-09-28 PROCEDURE — 3017F COLORECTAL CA SCREEN DOC REV: CPT | Performed by: UROLOGY

## 2020-09-28 PROCEDURE — 99214 OFFICE O/P EST MOD 30 MIN: CPT | Performed by: UROLOGY

## 2020-09-28 PROCEDURE — 1090F PRES/ABSN URINE INCON ASSESS: CPT | Performed by: UROLOGY

## 2020-09-28 PROCEDURE — 1123F ACP DISCUSS/DSCN MKR DOCD: CPT | Performed by: UROLOGY

## 2020-09-28 PROCEDURE — G8427 DOCREV CUR MEDS BY ELIG CLIN: HCPCS | Performed by: UROLOGY

## 2020-09-28 RX ORDER — ACYCLOVIR 800 MG/1
800 TABLET ORAL 2 TIMES DAILY
COMMUNITY
End: 2021-08-06

## 2020-09-28 ASSESSMENT — ENCOUNTER SYMPTOMS
COLOR CHANGE: 0
EYE PAIN: 0
BACK PAIN: 0
VOMITING: 0
NAUSEA: 0
WHEEZING: 0
COUGH: 0
ABDOMINAL PAIN: 0
EYE REDNESS: 0
SHORTNESS OF BREATH: 0

## 2020-09-28 NOTE — PROGRESS NOTES
BUN 19 07/21/2020     Lab Results   Component Value Date    CREATININE 0.81 07/21/2020       Additional Lab/Culture results: none      PAST MEDICAL, FAMILY AND SOCIAL HISTORY UPDATE:  Past Medical History:   Diagnosis Date    Arthritis     osteoarthritis    Hypertension      Past Surgical History:   Procedure Laterality Date    CHOLECYSTECTOMY, LAPAROSCOPIC  2012    DILATION AND CURETTAGE OF UTERUS  1982    HIP ARTHROPLASTY Left     [place] TOTAL HIP ARTHROPLASTY Left 11/14/2017    HIP TOTAL ARTHROPLASTY ANTERIOR APPROACH (MEDACTA, FASCIA ILIACA BLOCK VS. LUMBAR PLEXUS PRE-OP, SPINAL VS. GENERAL, MEDACTA TABLE, C-ARM) performed by Sandra Powell DO at [address] Bilateral     as child (lazy eye?? )    TUBAL LIGATION       Family History   Problem Relation Age of Onset    Heart Disease Mother     Diabetes Father     Diabetes Brother      Outpatient Medications Marked as Taking for the 9/28/20 encounter (Office Visit) with Nirav Guzman MD   Medication Sig Dispense Refill    acyclovir (ZOVIRAX) 800 MG tablet Take 800 mg by mouth 2 times daily      tamsulosin (FLOMAX) 0.4 MG capsule take 1 capsule by mouth once daily TO PASS URETERAL STONE 30 capsule 0    omeprazole (PRILOSEC) 40 MG delayed release capsule take 1 capsule by mouth every morning before breakfast 30 capsule 5    ibuprofen (ADVIL;MOTRIN) 800 MG tablet Take 1 tablet by mouth every 8 hours as needed for Pain 30 tablet 0    aluminum & magnesium hydroxide-simethicone (MAALOX ADVANCED MAX ST) 400-400-40 MG/5ML SUSP Take 30 mLs by mouth 3 times daily as needed (heartburn/indigestion) 355 mL 0    sennosides-docusate sodium (SENOKOT-S) 8.6-50 MG tablet Take 2 tablets by mouth daily 60 tablet 0    lisinopril-hydroCHLOROthiazide (PRINZIDE;ZESTORETIC) 20-12.5 MG per tablet Take one tablet by mouth daily 90 tablet 1    Calcium Carb-Cholecalciferol (CALCIUM-VITAMIN D) 600-400 MG-UNIT TABS Take 1 Dose by mouth daily 30 tablet 5      (All medications reviewed and updated by provider sincelast office visit or hospitalization)   Allopurinol and Morphine  Social History     Tobacco Use   Smoking Status Never Smoker   Smokeless Tobacco Never Used      (If patient a smoker, smoking cessation counseling offered)     Social History     Substance and Sexual Activity   Alcohol Use Yes    Comment: 1 glass per month       REVIEW OF SYSTEMS:  Review of Systems      Physical Exam:      Vitals:    09/28/20 0835   BP: 122/83   Pulse:    Temp:      There is no height or weight on file to calculate BMI. Patient is a 78 y.o. female in noacute distress and alert and oriented to person, place and time. Physical Exam  Constitutional: Patient in no acute distress. Neuro: Alert andoriented to person, place and time. Psych: Mood normal, affect normal  Skin: No rash noted  HEENT: Head: Normocephalic and atraumatic  Conjunctivae and EOM are normal. Pupils are equal, round  Nose: Normal  Right External Ear: Normal; Left External Ear: Normal  Mouth: Mucosa Moist  Neck: Supple  Lungs: Respiratory effort is normal  Cardiovascular: Warm & Pink  Abdomen: Soft, non-tender, non-distended with no CVA,  No flank tenderness,  Or hepatosplenomegaly   Lymphatics: No palpable lymphadenopathy. Bladder non-tender and not distended. Musculoskeletal: Normalgait and station      and Plan      1. Weak urinary stream    2. Flank pain    3. Kidney stone           Plan:   Pain intermittent but improving  luts improved  F/u one year kub     No follow-ups on file. Prescriptions Ordered:  No orders of the defined types were placed in this encounter. Orders Placed:  No orders of the defined types were placed in this encounter. Dom Lemons MD    Agree with the ROS entered by the MA.

## 2020-10-14 ENCOUNTER — HOSPITAL ENCOUNTER (OUTPATIENT)
Age: 70
Setting detail: SPECIMEN
Discharge: HOME | End: 2020-10-14
Payer: MEDICARE

## 2020-10-14 LAB
ALBUMIN SERPL-MCNC: 4.2 G/DL (ref 3.5–5.2)
ALBUMIN/GLOBULIN RATIO: 1.4 (ref 1–2.5)
ALP BLD-CCNC: 108 U/L (ref 35–104)
ALT SERPL-CCNC: 30 U/L (ref 5–33)
ANION GAP SERPL CALCULATED.3IONS-SCNC: 13 MMOL/L (ref 9–17)
AST SERPL-CCNC: 25 U/L
BILIRUB SERPL-MCNC: 0.49 MG/DL (ref 0.3–1.2)
BUN BLDV-MCNC: 25 MG/DL (ref 8–23)
BUN/CREAT BLD: ABNORMAL (ref 9–20)
CALCIUM SERPL-MCNC: 9.9 MG/DL (ref 8.6–10.4)
CHLORIDE BLD-SCNC: 103 MMOL/L (ref 98–107)
CHOLESTEROL, FASTING: 170 MG/DL
CHOLESTEROL/HDL RATIO: 2.1
CO2: 25 MMOL/L (ref 20–31)
CREAT SERPL-MCNC: 0.96 MG/DL (ref 0.5–0.9)
ESTIMATED AVERAGE GLUCOSE: 123 MG/DL
GFR AFRICAN AMERICAN: >60 ML/MIN
GFR NON-AFRICAN AMERICAN: 58 ML/MIN
GFR SERPL CREATININE-BSD FRML MDRD: ABNORMAL ML/MIN/{1.73_M2}
GFR SERPL CREATININE-BSD FRML MDRD: ABNORMAL ML/MIN/{1.73_M2}
GLUCOSE BLD-MCNC: 98 MG/DL (ref 70–99)
HBA1C MFR BLD: 5.9 % (ref 4–6)
HCT VFR BLD CALC: 46.8 % (ref 36.3–47.1)
HDLC SERPL-MCNC: 80 MG/DL
HEMOGLOBIN: 14.7 G/DL (ref 11.9–15.1)
LDL CHOLESTEROL: 62 MG/DL (ref 0–130)
MCH RBC QN AUTO: 28 PG (ref 25.2–33.5)
MCHC RBC AUTO-ENTMCNC: 31.4 G/DL (ref 28.4–34.8)
MCV RBC AUTO: 89.1 FL (ref 82.6–102.9)
NRBC AUTOMATED: 0 PER 100 WBC
PDW BLD-RTO: 16.1 % (ref 11.8–14.4)
PLATELET # BLD: 233 K/UL (ref 138–453)
PMV BLD AUTO: 11.6 FL (ref 8.1–13.5)
POTASSIUM SERPL-SCNC: 4.7 MMOL/L (ref 3.7–5.3)
RBC # BLD: 5.25 M/UL (ref 3.95–5.11)
SODIUM BLD-SCNC: 141 MMOL/L (ref 135–144)
TOTAL PROTEIN: 7.2 G/DL (ref 6.4–8.3)
TRIGLYCERIDE, FASTING: 139 MG/DL
TSH SERPL DL<=0.05 MIU/L-ACNC: 2.11 MIU/L (ref 0.3–5)
VITAMIN D 25-HYDROXY: 24.5 NG/ML (ref 30–100)
VLDLC SERPL CALC-MCNC: NORMAL MG/DL (ref 1–30)
WBC # BLD: 6.5 K/UL (ref 3.5–11.3)

## 2020-10-31 ENCOUNTER — HOSPITAL ENCOUNTER (EMERGENCY)
Age: 70
Discharge: HOME | End: 2020-10-31
Attending: EMERGENCY MEDICINE
Payer: MEDICARE

## 2020-10-31 ENCOUNTER — APPOINTMENT (OUTPATIENT)
Dept: CT IMAGING | Age: 70
End: 2020-10-31
Payer: MEDICARE

## 2020-10-31 ENCOUNTER — APPOINTMENT (OUTPATIENT)
Dept: GENERAL RADIOLOGY | Age: 70
End: 2020-10-31
Payer: MEDICARE

## 2020-10-31 VITALS
HEART RATE: 103 BPM | OXYGEN SATURATION: 96 % | RESPIRATION RATE: 18 BRPM | HEIGHT: 66 IN | WEIGHT: 185 LBS | BODY MASS INDEX: 29.73 KG/M2 | TEMPERATURE: 100.8 F | DIASTOLIC BLOOD PRESSURE: 87 MMHG | SYSTOLIC BLOOD PRESSURE: 129 MMHG

## 2020-10-31 LAB
ABSOLUTE EOS #: <0.03 K/UL (ref 0–0.44)
ABSOLUTE IMMATURE GRANULOCYTE: <0.03 K/UL (ref 0–0.3)
ABSOLUTE LYMPH #: 1.08 K/UL (ref 1.1–3.7)
ABSOLUTE MONO #: 0.47 K/UL (ref 0.1–1.2)
ANION GAP SERPL CALCULATED.3IONS-SCNC: 11 MMOL/L (ref 9–17)
BASOPHILS # BLD: 0 % (ref 0–2)
BASOPHILS ABSOLUTE: <0.03 K/UL (ref 0–0.2)
BUN BLDV-MCNC: 14 MG/DL (ref 8–23)
BUN/CREAT BLD: ABNORMAL (ref 9–20)
CALCIUM SERPL-MCNC: 9.1 MG/DL (ref 8.6–10.4)
CHLORIDE BLD-SCNC: 96 MMOL/L (ref 98–107)
CO2: 23 MMOL/L (ref 20–31)
CREAT SERPL-MCNC: 0.99 MG/DL (ref 0.5–0.9)
DIFFERENTIAL TYPE: ABNORMAL
EOSINOPHILS RELATIVE PERCENT: 0 % (ref 1–4)
GFR AFRICAN AMERICAN: >60 ML/MIN
GFR NON-AFRICAN AMERICAN: 56 ML/MIN
GFR SERPL CREATININE-BSD FRML MDRD: ABNORMAL ML/MIN/{1.73_M2}
GFR SERPL CREATININE-BSD FRML MDRD: ABNORMAL ML/MIN/{1.73_M2}
GLUCOSE BLD-MCNC: 94 MG/DL (ref 70–99)
HCT VFR BLD CALC: 44.2 % (ref 36.3–47.1)
HEMOGLOBIN: 14.1 G/DL (ref 11.9–15.1)
IMMATURE GRANULOCYTES: 0 %
LYMPHOCYTES # BLD: 19 % (ref 24–43)
MCH RBC QN AUTO: 27.7 PG (ref 25.2–33.5)
MCHC RBC AUTO-ENTMCNC: 31.9 G/DL (ref 28.4–34.8)
MCV RBC AUTO: 86.8 FL (ref 82.6–102.9)
MONOCYTES # BLD: 8 % (ref 3–12)
NRBC AUTOMATED: 0 PER 100 WBC
PDW BLD-RTO: 14.9 % (ref 11.8–14.4)
PLATELET # BLD: 155 K/UL (ref 138–453)
PLATELET ESTIMATE: ABNORMAL
PMV BLD AUTO: 11.4 FL (ref 8.1–13.5)
POTASSIUM SERPL-SCNC: 4.2 MMOL/L (ref 3.7–5.3)
RBC # BLD: 5.09 M/UL (ref 3.95–5.11)
RBC # BLD: ABNORMAL 10*6/UL
SEG NEUTROPHILS: 73 % (ref 36–65)
SEGMENTED NEUTROPHILS ABSOLUTE COUNT: 4.03 K/UL (ref 1.5–8.1)
SODIUM BLD-SCNC: 130 MMOL/L (ref 135–144)
WBC # BLD: 5.6 K/UL (ref 3.5–11.3)
WBC # BLD: ABNORMAL 10*3/UL

## 2020-10-31 PROCEDURE — 2580000003 HC RX 258: Performed by: STUDENT IN AN ORGANIZED HEALTH CARE EDUCATION/TRAINING PROGRAM

## 2020-10-31 PROCEDURE — 70498 CT ANGIOGRAPHY NECK: CPT

## 2020-10-31 PROCEDURE — 80048 BASIC METABOLIC PNL TOTAL CA: CPT

## 2020-10-31 PROCEDURE — 99284 EMERGENCY DEPT VISIT MOD MDM: CPT

## 2020-10-31 PROCEDURE — 85025 COMPLETE CBC W/AUTO DIFF WBC: CPT

## 2020-10-31 PROCEDURE — 96375 TX/PRO/DX INJ NEW DRUG ADDON: CPT

## 2020-10-31 PROCEDURE — 96374 THER/PROPH/DIAG INJ IV PUSH: CPT

## 2020-10-31 PROCEDURE — 6360000002 HC RX W HCPCS: Performed by: STUDENT IN AN ORGANIZED HEALTH CARE EDUCATION/TRAINING PROGRAM

## 2020-10-31 PROCEDURE — 6360000004 HC RX CONTRAST MEDICATION: Performed by: EMERGENCY MEDICINE

## 2020-10-31 PROCEDURE — 70450 CT HEAD/BRAIN W/O DYE: CPT

## 2020-10-31 PROCEDURE — 6370000000 HC RX 637 (ALT 250 FOR IP): Performed by: STUDENT IN AN ORGANIZED HEALTH CARE EDUCATION/TRAINING PROGRAM

## 2020-10-31 PROCEDURE — 71045 X-RAY EXAM CHEST 1 VIEW: CPT

## 2020-10-31 RX ORDER — 0.9 % SODIUM CHLORIDE 0.9 %
1000 INTRAVENOUS SOLUTION INTRAVENOUS ONCE
Status: COMPLETED | OUTPATIENT
Start: 2020-10-31 | End: 2020-10-31

## 2020-10-31 RX ORDER — PROCHLORPERAZINE EDISYLATE 5 MG/ML
10 INJECTION INTRAMUSCULAR; INTRAVENOUS ONCE
Status: COMPLETED | OUTPATIENT
Start: 2020-10-31 | End: 2020-10-31

## 2020-10-31 RX ORDER — DIPHENHYDRAMINE HYDROCHLORIDE 50 MG/ML
25 INJECTION INTRAMUSCULAR; INTRAVENOUS ONCE
Status: COMPLETED | OUTPATIENT
Start: 2020-10-31 | End: 2020-10-31

## 2020-10-31 RX ORDER — ACETAMINOPHEN 325 MG/1
650 TABLET ORAL ONCE
Status: COMPLETED | OUTPATIENT
Start: 2020-10-31 | End: 2020-10-31

## 2020-10-31 RX ORDER — ACETAMINOPHEN, ASPIRIN AND CAFFEINE 250; 250; 65 MG/1; MG/1; MG/1
1 TABLET, FILM COATED ORAL EVERY 6 HOURS PRN
Qty: 90 TABLET | Refills: 3 | Status: SHIPPED | OUTPATIENT
Start: 2020-10-31 | End: 2021-03-22

## 2020-10-31 RX ADMIN — IOPAMIDOL 90 ML: 755 INJECTION, SOLUTION INTRAVENOUS at 17:45

## 2020-10-31 RX ADMIN — PROCHLORPERAZINE EDISYLATE 10 MG: 5 INJECTION INTRAMUSCULAR; INTRAVENOUS at 15:11

## 2020-10-31 RX ADMIN — SODIUM CHLORIDE 1000 ML: 9 INJECTION, SOLUTION INTRAVENOUS at 15:11

## 2020-10-31 RX ADMIN — ACETAMINOPHEN 650 MG: 325 TABLET ORAL at 15:11

## 2020-10-31 RX ADMIN — DIPHENHYDRAMINE HYDROCHLORIDE 25 MG: 50 INJECTION, SOLUTION INTRAMUSCULAR; INTRAVENOUS at 15:11

## 2020-10-31 ASSESSMENT — ENCOUNTER SYMPTOMS
BACK PAIN: 0
PHOTOPHOBIA: 0
NAUSEA: 0
VOMITING: 0
SORE THROAT: 0
ABDOMINAL PAIN: 0
SHORTNESS OF BREATH: 0

## 2020-10-31 ASSESSMENT — PAIN DESCRIPTION - LOCATION: LOCATION: HEAD

## 2020-10-31 ASSESSMENT — PAIN SCALES - GENERAL
PAINLEVEL_OUTOF10: 10
PAINLEVEL_OUTOF10: 9

## 2020-10-31 NOTE — ED PROVIDER NOTES
[facility] ED     Emergency Department     Faculty Attestation        I performed a history and physical examination of the patient and discussed management with the resident. I reviewed the residents note and agree with the documented findings and plan of care. Any areas of disagreement are noted on the chart. I was personally present for the key portions of any procedures. I have documented in the chart those procedures where I was not present during the key portions. I have reviewed the emergency nurses triage note. I agree with the chief complaint, past medical history, past surgical history, allergies, medications, social and family history as documented unless otherwise noted below. For Physician Assistant/ Nurse Practitioner cases/documentation I have personally evaluated this patient and have completed at least one if not all key elements of the E/M (history, physical exam, and MDM). Additional findings are as noted. Vital Signs: BP: 129/87  Pulse: 103  Resp: 18  Temp: 100.8 °F (38.2 °C) SpO2: 96 %  PCP:  ZA Hawthorne - CNP    Pertinent Comments:         Critical Care  None    This patient was evaluated in the Emergency Department for symptoms described in the history of present illness. He/she was evaluated in the context of the global COVID-19 pandemic, which necessitated consideration that the patient might be at risk for infection with the SARS-CoV-2 virus that causes COVID-19. Institutional protocols and algorithms that pertain to the evaluation of patients at risk for COVID-19 are in a state of rapid change based on information released by regulatory bodies including the CDC and federal and state organizations. These policies and algorithms were followed during the patient's care in the ED.     (Please note that portions of this note were completed with a voice recognition program. Efforts were made to edit the dictations but occasionally words are mis-transcribed.  Whenever words are used in this note in any gender, they shall be construed as though they were used in the gender appropriate to the circumstances; and whenever words are used in this note in the singular or plural form, they shall be construed as though they were used in the form appropriate to the circumstances.)    [facility]  Attending Emergency Medicine Physician             Manasa Dela Cruz MD  10/31/20 2783

## 2020-10-31 NOTE — ED PROVIDER NOTES
%    This patient is a 78 y.o. Female with sudden onset headache of 1 week duration, covid exposure. Follow-up on CT head, CTA, patient treated with migraine cocktail (benadryl, compazine) fluids, tylenol for fever. Patient notably had concerns for pneumonia on CT a of the head and neck which ran down to the notch of the aorta. An x-ray which is negative for acute pathology, patient's  has Covid, patient given Covid precautions, with concerns for Covid pneumonia, patient discharged    OUTSTANDING TASKS / RECOMMENDATIONS:      Await CT scan, disposition   FINAL IMPRESSION:     1.  Nonintractable headache, unspecified chronicity pattern, unspecified headache type        DISPOSITION:       DISPOSITION:  [x]  Discharge   []  Transfer -    []  Admission -     []  Against Medical Advice   []  Eloped   FOLLOW-UP: [facility]  [address] [place] [address]  [phone]    As needed    Altaf Lance, APRN - CNP  [address] [facility] [address]  [phone]      As needed     DISCHARGE MEDICATIONS: Discharge Medication List as of 10/31/2020  7:20 PM      START taking these medications    Details   aspirin-acetaminophen-caffeine (EXCEDRIN EXTRA STRENGTH) 115-047-75 MG per tablet Take 1 tablet by mouth every 6 hours as needed for Headaches, Disp-90 tablet,R-3Pmaria fernanda Cisse MD  Emergency Medicine Resident  [address] MD  Resident  10/31/20 9907

## 2020-10-31 NOTE — ED PROVIDER NOTES
[address] [facility]  ED  Emergency Department Encounter  Emergency Medicine Resident     Pt Name: Oliver Chisholm  MRN: [id number]  Lloydgfnicolas 1950  Date of evaluation: 10/31/20  PCP:  ZA Baltazar CNP    CHIEF COMPLAINT       Chief Complaint   Patient presents with    Headache     x 1 week    Other     pt's  tested positive for covid yesterday    Fever       HISTORY OFPRESENT ILLNESS  (Location/Symptom, Timing/Onset, Context/Setting, Quality, Duration, Modifying Factors,Severity.)      Oliver Chisholm is a 78year old female who presents with primary complaint of headache. The patient reports that her headache started 1 week ago. She says it was sudden onset. It localized to the right side of her head and has been constant since onset. Patient denies any history of headaches. She does report that this is the worst headache she is ever experienced. She is not take any medications for her headache. Denies any nausea/vomiting, photophobia or vision changes. She does complain of some phonophobia. In addition, the patient had exposure to Angi via her . She reports her  tested positive yesterday morning. The patient denies any fevers at home, cough, abdominal pain. She takes antihypertensives but forgot her dose this morning. PAST MEDICAL / SURGICAL / SOCIAL / FAMILY HISTORY      has a past medical history of Arthritis and Hypertension. has a past surgical history that includes Cholecystectomy, laparoscopic (2012); Tubal ligation; Dilation and curettage of uterus (1982); Strabismus surgery (Bilateral); Hip Arthroplasty (Left); and pr total hip arthroplasty (Left, 11/14/2017).      Social History     Socioeconomic History    Marital status:      Spouse name: Not on file    Number of children: Not on file    Years of education: Not on file    Highest education level: Not on file   Occupational History    Occupation: real estate Social Needs    Financial resource strain: Not on file    Food insecurity     Worry: Not on file     Inability: Not on file    Transportation needs     Medical: Not on file     Non-medical: Not on file   Tobacco Use    Smoking status: Never Smoker    Smokeless tobacco: Never Used   Substance and Sexual Activity    Alcohol use: Yes     Comment: 1 glass per month    Drug use: No    Sexual activity: Not on file   Lifestyle    Physical activity     Days per week: Not on file     Minutes per session: Not on file    Stress: Not on file   Relationships    Social connections     Talks on phone: Not on file     Gets together: Not on file     Attends [employer] service: Not on file     Active member of club or organization: Not on file     Attends meetings of clubs or organizations: Not on file     Relationship status: Not on file    Intimate partner violence     Fear of current or ex partner: Not on file     Emotionally abused: Not on file     Physically abused: Not on file     Forced sexual activity: Not on file   Other Topics Concern    Not on file   Social History Narrative    Not on file       Family History   Problem Relation Age of Onset    Heart Disease Mother     Diabetes Father     Diabetes Brother         Allergies:  Allopurinol and Morphine    Home Medications:  Prior to Admission medications    Medication Sig Start Date End Date Taking?  Authorizing Provider   acyclovir (ZOVIRAX) 800 MG tablet Take 800 mg by mouth 2 times daily    Historical Provider, MD   tamsulosin (FLOMAX) 0.4 MG capsule take 1 capsule by mouth once daily TO PASS URETERAL STONE 9/14/20   Jose Roberto Gonzalez MD   omeprazole (PRILOSEC) 40 MG delayed release capsule take 1 capsule by mouth every morning before breakfast 8/25/20   ZA Robison - CNP   ibuprofen (ADVIL;MOTRIN) 800 MG tablet Take 1 tablet by mouth every 8 hours as needed for Pain 7/21/20   Chelsea Dupont MD   aluminum & magnesium hydroxide-simethicone Cardiovascular:      Rate and Rhythm: Normal rate and regular rhythm. Heart sounds: No murmur. No friction rub. No gallop. Pulmonary:      Effort: Pulmonary effort is normal. No respiratory distress. Breath sounds: Normal breath sounds. No wheezing or rales. Abdominal:      General: There is no distension. Palpations: Abdomen is soft. Tenderness: There is no abdominal tenderness. There is no guarding. Musculoskeletal:      Comments: No lower extremity edema    Skin:     General: Skin is warm. Neurological:      General: No focal deficit present. Mental Status: She is alert and oriented to person, place, and time. Comments: 5 out of 5 strength upper and lower extremities bilaterally, sensation intact bilateral extremities, cranial nerves III through VII, XII intact, no pronator drift, normal finger-to-nose testing         DIFFERENTIAL  DIAGNOSIS     PLAN (LABS / IMAGING / EKG):  Orders Placed This Encounter   Procedures    CT HEAD WO CONTRAST    CTA HEAD NECK W CONTRAST    CBC WITH AUTO DIFFERENTIAL    BASIC METABOLIC PANEL       MEDICATIONS ORDERED:  Orders Placed This Encounter   Medications    prochlorperazine (COMPAZINE) injection 10 mg    diphenhydrAMINE (BENADRYL) injection 25 mg    0.9 % sodium chloride bolus    acetaminophen (TYLENOL) tablet 650 mg         Initial MDM/Plan: 78 y.o. female who presents with headache. The patient is febrile on arrival and mildly tachycardic. Vital signs otherwise stable. The patient is alert and oriented. Lungs were clear to auscultation bilaterally. Abdomen soft, nondistended nontender. No focal motor deficits and cranial nerves intact. Neck was supple. Low concern for meningitis as the patient's not altered and neck is supple. Her fevers likely secondary to her Covid exposure or other viral infection. Given the concerning symptoms on history of acute onset and worse headache of life, will get CT head and CTA.   We will

## 2020-10-31 NOTE — ED NOTES
Pt came into ER in NAD   Pt reports headache sine Sunday   Pt denies injury to head   Pt states her  was tested and was covid positive   Pt has no other symptoms bedside a headache      Patricio Bustamante RN  10/31/20 4854

## 2020-10-31 NOTE — ED NOTES
Pt ambulated to bathroom in NAD with steady gait   Pt reports improvement in headache      Arturo House RN  10/31/20 6392

## 2020-11-04 ENCOUNTER — TELEPHONE (OUTPATIENT)
Dept: FAMILY MEDICINE CLINIC | Age: 70
End: 2020-11-04

## 2020-11-04 NOTE — TELEPHONE ENCOUNTER
Patient called stating she was tested positive at [facility] on Monday. States she has a cough, diarrhea, and headache. Patient is requesting medication for this. Informed patient she would need an appt but due to positive test she should contact our flu clinic and see if they can help. Verbally understood.

## 2020-11-16 ENCOUNTER — TELEPHONE (OUTPATIENT)
Dept: FAMILY MEDICINE CLINIC | Age: 70
End: 2020-11-16

## 2021-02-14 DIAGNOSIS — I10 ESSENTIAL HYPERTENSION: ICD-10-CM

## 2021-02-15 RX ORDER — LISINOPRIL AND HYDROCHLOROTHIAZIDE 20; 12.5 MG/1; MG/1
TABLET ORAL
Qty: 30 TABLET | Refills: 0 | Status: SHIPPED | OUTPATIENT
Start: 2021-02-15 | End: 2021-03-22 | Stop reason: SDUPTHER

## 2021-02-15 NOTE — TELEPHONE ENCOUNTER
Last visit: 3/9/20  Last Med refill: 11/12/20  Does patient have enough medication for 72 hours: No:     PATIENT NEEDS APPT    Next Visit Date:  No future appointments. Health Maintenance   Topic Date Due    COVID-19 Vaccine (1 of 2) 03/15/1966    Annual Wellness Visit (AWV)  05/29/2019    A1C test (Diabetic or Prediabetic)  10/14/2021    Breast cancer screen  10/24/2021    Potassium monitoring  10/31/2021    Creatinine monitoring  10/31/2021    Lipid screen  10/14/2025    DTaP/Tdap/Td vaccine (2 - Td) 08/21/2027    Colon cancer screen colonoscopy  02/28/2029    DEXA (modify frequency per FRAX score)  Completed    Flu vaccine  Completed    Shingles Vaccine  Completed    Pneumococcal 65+ years Vaccine  Completed    Hepatitis C screen  Completed    Hepatitis A vaccine  Aged Out    Hepatitis B vaccine  Aged Out    Hib vaccine  Aged Out    Meningococcal (ACWY) vaccine  Aged Out       Hemoglobin A1C (%)   Date Value   10/14/2020 5.9   02/11/2019 5.9   08/31/2017 5.7             ( goal A1C is < 7)   No results found for: LABMICR  LDL Cholesterol (mg/dL)   Date Value   10/14/2020 62   02/22/2019 56       (goal LDL is <100)   AST (U/L)   Date Value   10/14/2020 25     ALT (U/L)   Date Value   10/14/2020 30     BUN (mg/dL)   Date Value   10/31/2020 14     BP Readings from Last 3 Encounters:   10/31/20 129/87   09/28/20 122/83   07/21/20 137/80          (goal 120/80)    All Future Testing planned in [facility]  Lab Frequency Next Occurrence   XR ABDOMEN (KUB) (SINGLE AP VIEW) Once 79/93/0901   Basic Metabolic Panel Once 90/27/1652   ANALY DIGITAL SCREEN W OR WO CAD BILATERAL Once 05/16/2021               Patient Active Problem List:     Essential hypertension     Osteopenia     Status post total replacement of left hip     Obesity (BMI 30-39. 9)     Hyperglycemia     High triglycerides

## 2021-03-02 ENCOUNTER — HOSPITAL ENCOUNTER (OUTPATIENT)
Dept: CT IMAGING | Age: 71
Discharge: HOME | End: 2021-03-04
Payer: MEDICARE

## 2021-03-02 ENCOUNTER — TELEPHONE (OUTPATIENT)
Dept: UROLOGY | Age: 71
End: 2021-03-02

## 2021-03-02 DIAGNOSIS — R10.9 FLANK PAIN: ICD-10-CM

## 2021-03-02 DIAGNOSIS — R10.9 FLANK PAIN: Primary | ICD-10-CM

## 2021-03-02 PROCEDURE — 74176 CT ABD & PELVIS W/O CONTRAST: CPT

## 2021-03-02 NOTE — TELEPHONE ENCOUNTER
Patient called, she stated that Dr. Horton Bamberger told her she can call if she was having issues with kidney stones. \" I have had 3 episodes of kidney stones in last month. My 3rd episode started last night. I was wondering if stress had anything to do with stones, I have been dealing with my  and will be moving him to hospice in the next few weeks. I wanted Dr. Horton Bamberger to know that I was also taking leftover Flomax. I had two and they did help with the pain. The pain was also on the left side before but the last two times it has been on the right side. I have no fever or issues urinating. \"

## 2021-03-02 NOTE — TELEPHONE ENCOUNTER
Writer spoke with Dr. Court Jacobo, and he suggested that a stone protocol needed to be done and follow up with kianna this week.

## 2021-03-04 NOTE — TELEPHONE ENCOUNTER
Writer contacted patient to inform her that per Dr. Armenta Orts are no obvious stones that are blocking her kidneys to explain her pain. She can follow up in 6 weeks with him with a KUB and that she can contact her PCP to look for other causes for pain. You can let her know she has stones but they're not in a location that typically causes pain. \" Patient did verbalize understanding and stated \"I think I passed the stone yesterday, but I will call in 6 weeks to make an apt. \"  Writer informed patient KUB order will be in there. Patient thanked writer and call was ended.

## 2021-03-16 DIAGNOSIS — I10 ESSENTIAL HYPERTENSION: ICD-10-CM

## 2021-03-16 RX ORDER — LISINOPRIL AND HYDROCHLOROTHIAZIDE 20; 12.5 MG/1; MG/1
TABLET ORAL
Qty: 30 TABLET | Refills: 0 | OUTPATIENT
Start: 2021-03-16

## 2021-03-22 ENCOUNTER — OFFICE VISIT (OUTPATIENT)
Dept: FAMILY MEDICINE CLINIC | Age: 71
End: 2021-03-22
Payer: MEDICARE

## 2021-03-22 VITALS
HEART RATE: 82 BPM | WEIGHT: 224 LBS | DIASTOLIC BLOOD PRESSURE: 84 MMHG | SYSTOLIC BLOOD PRESSURE: 134 MMHG | BODY MASS INDEX: 36.15 KG/M2 | OXYGEN SATURATION: 95 %

## 2021-03-22 DIAGNOSIS — B96.89 ACUTE BACTERIAL SINUSITIS: Primary | ICD-10-CM

## 2021-03-22 DIAGNOSIS — J01.90 ACUTE BACTERIAL SINUSITIS: Primary | ICD-10-CM

## 2021-03-22 DIAGNOSIS — I10 ESSENTIAL HYPERTENSION: ICD-10-CM

## 2021-03-22 DIAGNOSIS — R10.9 FLANK PAIN: ICD-10-CM

## 2021-03-22 PROCEDURE — 1036F TOBACCO NON-USER: CPT | Performed by: NURSE PRACTITIONER

## 2021-03-22 PROCEDURE — G8399 PT W/DXA RESULTS DOCUMENT: HCPCS | Performed by: NURSE PRACTITIONER

## 2021-03-22 PROCEDURE — 3017F COLORECTAL CA SCREEN DOC REV: CPT | Performed by: NURSE PRACTITIONER

## 2021-03-22 PROCEDURE — G8482 FLU IMMUNIZE ORDER/ADMIN: HCPCS | Performed by: NURSE PRACTITIONER

## 2021-03-22 PROCEDURE — G8417 CALC BMI ABV UP PARAM F/U: HCPCS | Performed by: NURSE PRACTITIONER

## 2021-03-22 PROCEDURE — G8427 DOCREV CUR MEDS BY ELIG CLIN: HCPCS | Performed by: NURSE PRACTITIONER

## 2021-03-22 PROCEDURE — 1090F PRES/ABSN URINE INCON ASSESS: CPT | Performed by: NURSE PRACTITIONER

## 2021-03-22 PROCEDURE — 99214 OFFICE O/P EST MOD 30 MIN: CPT | Performed by: NURSE PRACTITIONER

## 2021-03-22 PROCEDURE — 4040F PNEUMOC VAC/ADMIN/RCVD: CPT | Performed by: NURSE PRACTITIONER

## 2021-03-22 PROCEDURE — 1123F ACP DISCUSS/DSCN MKR DOCD: CPT | Performed by: NURSE PRACTITIONER

## 2021-03-22 RX ORDER — LISINOPRIL AND HYDROCHLOROTHIAZIDE 20; 12.5 MG/1; MG/1
TABLET ORAL
Qty: 90 TABLET | Refills: 1 | Status: SHIPPED | OUTPATIENT
Start: 2021-03-22 | End: 2021-09-13

## 2021-03-22 RX ORDER — AMOXICILLIN AND CLAVULANATE POTASSIUM 875; 125 MG/1; MG/1
1 TABLET, FILM COATED ORAL 2 TIMES DAILY
Qty: 14 TABLET | Refills: 0 | Status: SHIPPED | OUTPATIENT
Start: 2021-03-22 | End: 2021-03-29

## 2021-03-22 RX ORDER — HYDROCODONE BITARTRATE AND ACETAMINOPHEN 5; 325 MG/1; MG/1
1 TABLET ORAL 2 TIMES DAILY PRN
Qty: 14 TABLET | Refills: 0 | Status: SHIPPED | OUTPATIENT
Start: 2021-03-22 | End: 2021-03-29

## 2021-03-22 SDOH — ECONOMIC STABILITY: FOOD INSECURITY: WITHIN THE PAST 12 MONTHS, THE FOOD YOU BOUGHT JUST DIDN'T LAST AND YOU DIDN'T HAVE MONEY TO GET MORE.: NEVER TRUE

## 2021-03-22 SDOH — ECONOMIC STABILITY: TRANSPORTATION INSECURITY
IN THE PAST 12 MONTHS, HAS LACK OF TRANSPORTATION KEPT YOU FROM MEETINGS, WORK, OR FROM GETTING THINGS NEEDED FOR DAILY LIVING?: NO

## 2021-03-22 ASSESSMENT — ENCOUNTER SYMPTOMS
COUGH: 0
SHORTNESS OF BREATH: 0

## 2021-03-22 NOTE — PROGRESS NOTES
Patient is present complaining of back pain  Patient states she has been having kidney stones for the past few weeks  Patient states she has been f/u with urology for this
Disp Refills    lisinopril-hydroCHLOROthiazide (PRINZIDE;ZESTORETIC) 20-12.5 MG per tablet 90 tablet 1     Sig: take 1 tablet by mouth once daily    amoxicillin-clavulanate (AUGMENTIN) 875-125 MG per tablet 14 tablet 0     Sig: Take 1 tablet by mouth 2 times daily for 7 days    HYDROcodone-acetaminophen (NORCO) 5-325 MG per tablet 14 tablet 0     Sig: Take 1 tablet by mouth 2 times daily as needed for Pain for up to 7 days. Take lowest dose possible to manage pain       This note was transcribed using dictation with Dragon services. Efforts were made to correct any errors but some words may be misinterpreted.     Electronically signed by Tayo Feliz CNP on 3/31/2021 at 3:27 PM

## 2021-03-26 ENCOUNTER — HOSPITAL ENCOUNTER (OUTPATIENT)
Facility: CLINIC | Age: 71
Discharge: HOME | End: 2021-03-28
Payer: MEDICARE

## 2021-03-26 ENCOUNTER — HOSPITAL ENCOUNTER (OUTPATIENT)
Dept: GENERAL RADIOLOGY | Facility: CLINIC | Age: 71
Discharge: HOME | End: 2021-03-28
Payer: MEDICARE

## 2021-03-26 DIAGNOSIS — N20.0 KIDNEY STONE: ICD-10-CM

## 2021-03-26 PROCEDURE — 74018 RADEX ABDOMEN 1 VIEW: CPT

## 2021-03-29 ENCOUNTER — OFFICE VISIT (OUTPATIENT)
Dept: UROLOGY | Age: 71
End: 2021-03-29
Payer: MEDICARE

## 2021-03-29 VITALS
TEMPERATURE: 95.3 F | BODY MASS INDEX: 36 KG/M2 | WEIGHT: 224 LBS | HEART RATE: 89 BPM | SYSTOLIC BLOOD PRESSURE: 112 MMHG | HEIGHT: 66 IN | DIASTOLIC BLOOD PRESSURE: 75 MMHG

## 2021-03-29 DIAGNOSIS — R10.9 FLANK PAIN: ICD-10-CM

## 2021-03-29 DIAGNOSIS — N20.0 KIDNEY STONE: Primary | ICD-10-CM

## 2021-03-29 PROCEDURE — 1123F ACP DISCUSS/DSCN MKR DOCD: CPT | Performed by: UROLOGY

## 2021-03-29 PROCEDURE — 3017F COLORECTAL CA SCREEN DOC REV: CPT | Performed by: UROLOGY

## 2021-03-29 PROCEDURE — 4040F PNEUMOC VAC/ADMIN/RCVD: CPT | Performed by: UROLOGY

## 2021-03-29 PROCEDURE — 1090F PRES/ABSN URINE INCON ASSESS: CPT | Performed by: UROLOGY

## 2021-03-29 PROCEDURE — G8482 FLU IMMUNIZE ORDER/ADMIN: HCPCS | Performed by: UROLOGY

## 2021-03-29 PROCEDURE — G8417 CALC BMI ABV UP PARAM F/U: HCPCS | Performed by: UROLOGY

## 2021-03-29 PROCEDURE — G8427 DOCREV CUR MEDS BY ELIG CLIN: HCPCS | Performed by: UROLOGY

## 2021-03-29 PROCEDURE — 99213 OFFICE O/P EST LOW 20 MIN: CPT | Performed by: UROLOGY

## 2021-03-29 PROCEDURE — G8399 PT W/DXA RESULTS DOCUMENT: HCPCS | Performed by: UROLOGY

## 2021-03-29 PROCEDURE — 1036F TOBACCO NON-USER: CPT | Performed by: UROLOGY

## 2021-03-29 ASSESSMENT — ENCOUNTER SYMPTOMS
WHEEZING: 0
EYE REDNESS: 0
COUGH: 0
NAUSEA: 0
ABDOMINAL PAIN: 0
EYE PAIN: 0
DIARRHEA: 0
VOMITING: 0
CONSTIPATION: 0
BACK PAIN: 0
SHORTNESS OF BREATH: 0

## 2021-03-29 NOTE — PROGRESS NOTES
Date    BUN 14 10/31/2020     Lab Results   Component Value Date    CREATININE 0.99 (H) 10/31/2020       Additional Lab/Culture results: none    PAST MEDICAL, FAMILY AND SOCIAL HISTORY UPDATE:  Past Medical History:   Diagnosis Date    Arthritis     osteoarthritis    Hypertension      Past Surgical History:   Procedure Laterality Date    CHOLECYSTECTOMY, LAPAROSCOPIC  2012    DILATION AND CURETTAGE OF UTERUS  1982    HIP ARTHROPLASTY Left     OK TOTAL HIP ARTHROPLASTY Left 11/14/2017    HIP TOTAL ARTHROPLASTY ANTERIOR APPROACH (MEDACTA, FASCIA ILIACA BLOCK VS. LUMBAR PLEXUS PRE-OP, SPINAL VS. GENERAL, MEDACTA TABLE, C-ARM) performed by Lisa Washington DO at [address] [place] Bilateral     as child (lazy eye?? )    TUBAL LIGATION       Family History   Problem Relation Age of Onset    Heart Disease Mother     Diabetes Father     Diabetes Brother      Outpatient Medications Marked as Taking for the 3/29/21 encounter (Office Visit) with Roseanne Kendall MD   Medication Sig Dispense Refill    lisinopril-hydroCHLOROthiazide (PRINZIDE;ZESTORETIC) 20-12.5 MG per tablet take 1 tablet by mouth once daily 90 tablet 1    amoxicillin-clavulanate (AUGMENTIN) 875-125 MG per tablet Take 1 tablet by mouth 2 times daily for 7 days 14 tablet 0    HYDROcodone-acetaminophen (NORCO) 5-325 MG per tablet Take 1 tablet by mouth 2 times daily as needed for Pain for up to 7 days.  Take lowest dose possible to manage pain 14 tablet 0    acyclovir (ZOVIRAX) 800 MG tablet Take 800 mg by mouth 2 times daily      omeprazole (PRILOSEC) 40 MG delayed release capsule take 1 capsule by mouth every morning before breakfast (Patient taking differently: Indications: gets OTC ) 30 capsule 5    Calcium Carb-Cholecalciferol (CALCIUM-VITAMIN D) 600-400 MG-UNIT TABS Take 1 Dose by mouth daily (Patient taking differently: Take 1 Dose by mouth daily Indications: gets OTC ) 30 tablet 5      (All medications reviewed and updated by provider sincelast office visit or hospitalization)   Allopurinol and Morphine  Social History     Tobacco Use   Smoking Status Never Smoker   Smokeless Tobacco Never Used      (If patient a smoker, smoking cessation counseling offered)     Social History     Substance and Sexual Activity   Alcohol Use Yes    Comment: 1 glass per month       REVIEW OF SYSTEMS:  Review of Systems      Physical Exam:      Vitals:    03/29/21 1042   BP: 112/75   Pulse: 89   Temp: 95.3 °F (35.2 °C)     Body mass index is 36.15 kg/m². Patient is a 73 y.o. female in noacute distress and alert and oriented to person, place and time. Physical Exam  Constitutional: Patient in no acute distress. Neuro: Alert andoriented to person, place and time. Psych: Mood normal, affect normal  Skin: No rash noted  HEENT: Head: Normocephalic and atraumatic  Conjunctivae and EOM are normal. Pupils are equal, round      and Plan      1. Kidney stone    2. Flank pain           Plan:   F/u 6 mo kub  Pt feeling better     Return in about 6 months (around 9/29/2021) for kub. Prescriptions Ordered:  No orders of the defined types were placed in this encounter. Orders Placed:  Orders Placed This Encounter   Procedures    XR ABDOMEN (KUB) (SINGLE AP VIEW)     Standing Status:   Future     Standing Expiration Date:   3/29/2022     Order Specific Question:   Reason for exam:     Answer:   kidney stone            Dilan Helm MD    Agree with the ROS entered by the MA.

## 2021-07-26 ENCOUNTER — HOSPITAL ENCOUNTER (OUTPATIENT)
Age: 71
Setting detail: SPECIMEN
Discharge: HOME | End: 2021-07-26
Payer: MEDICARE

## 2021-07-27 LAB
ALBUMIN SERPL-MCNC: 4.4 G/DL (ref 3.5–5.2)
ALBUMIN/GLOBULIN RATIO: 1.5 (ref 1–2.5)
ALP BLD-CCNC: 115 U/L (ref 35–104)
ALT SERPL-CCNC: 34 U/L (ref 5–33)
AMYLASE: 66 U/L (ref 28–100)
AST SERPL-CCNC: 31 U/L
BILIRUB SERPL-MCNC: 0.37 MG/DL (ref 0.3–1.2)
BILIRUBIN DIRECT: <0.08 MG/DL
BILIRUBIN, INDIRECT: ABNORMAL MG/DL (ref 0–1)
GLOBULIN: ABNORMAL G/DL (ref 1.5–3.8)
LIPASE: 41 U/L (ref 13–60)
TOTAL PROTEIN: 7.4 G/DL (ref 6.4–8.3)

## 2021-08-06 ENCOUNTER — OFFICE VISIT (OUTPATIENT)
Dept: FAMILY MEDICINE CLINIC | Age: 71
End: 2021-08-06
Payer: MEDICARE

## 2021-08-06 VITALS
SYSTOLIC BLOOD PRESSURE: 120 MMHG | BODY MASS INDEX: 36.64 KG/M2 | HEART RATE: 100 BPM | DIASTOLIC BLOOD PRESSURE: 70 MMHG | OXYGEN SATURATION: 97 % | WEIGHT: 227 LBS

## 2021-08-06 DIAGNOSIS — M54.50 LUMBAR PAIN: Primary | ICD-10-CM

## 2021-08-06 PROCEDURE — G8417 CALC BMI ABV UP PARAM F/U: HCPCS | Performed by: NURSE PRACTITIONER

## 2021-08-06 PROCEDURE — 1090F PRES/ABSN URINE INCON ASSESS: CPT | Performed by: NURSE PRACTITIONER

## 2021-08-06 PROCEDURE — 99213 OFFICE O/P EST LOW 20 MIN: CPT | Performed by: NURSE PRACTITIONER

## 2021-08-06 PROCEDURE — 3017F COLORECTAL CA SCREEN DOC REV: CPT | Performed by: NURSE PRACTITIONER

## 2021-08-06 PROCEDURE — G8427 DOCREV CUR MEDS BY ELIG CLIN: HCPCS | Performed by: NURSE PRACTITIONER

## 2021-08-06 PROCEDURE — 1036F TOBACCO NON-USER: CPT | Performed by: NURSE PRACTITIONER

## 2021-08-06 PROCEDURE — 1123F ACP DISCUSS/DSCN MKR DOCD: CPT | Performed by: NURSE PRACTITIONER

## 2021-08-06 PROCEDURE — 4040F PNEUMOC VAC/ADMIN/RCVD: CPT | Performed by: NURSE PRACTITIONER

## 2021-08-06 PROCEDURE — G8399 PT W/DXA RESULTS DOCUMENT: HCPCS | Performed by: NURSE PRACTITIONER

## 2021-08-06 RX ORDER — CYCLOBENZAPRINE HCL 5 MG
5 TABLET ORAL 2 TIMES DAILY PRN
Qty: 10 TABLET | Refills: 0 | Status: SHIPPED | OUTPATIENT
Start: 2021-08-06 | End: 2021-08-16

## 2021-08-06 ASSESSMENT — ENCOUNTER SYMPTOMS
BACK PAIN: 1
BOWEL INCONTINENCE: 0

## 2021-08-06 ASSESSMENT — PATIENT HEALTH QUESTIONNAIRE - PHQ9
SUM OF ALL RESPONSES TO PHQ9 QUESTIONS 1 & 2: 0
SUM OF ALL RESPONSES TO PHQ QUESTIONS 1-9: 0
2. FEELING DOWN, DEPRESSED OR HOPELESS: 0
1. LITTLE INTEREST OR PLEASURE IN DOING THINGS: 0

## 2021-08-06 NOTE — PROGRESS NOTES
[address] [place]  [phone]    Any Olsen is a 73 y.o. female who presents today for her  medical conditions/complaints as noted below. Any Olsen is c/o of Back Pain    HPI:     Back Pain  This is a recurrent problem. The current episode started more than 1 month ago. The problem occurs intermittently. The problem has been waxing and waning since onset. The pain is present in the lumbar spine and sacro-iliac. Pertinent negatives include no bladder incontinence, bowel incontinence, paresis or paresthesias. She has tried analgesics for the symptoms. Wt Readings from Last 3 Encounters:   08/06/21 227 lb (103 kg)   03/29/21 224 lb (101.6 kg)   03/22/21 224 lb (101.6 kg)     Nursing note reviewed and discussed with patient. Patient's medications, allergies, past medical, surgical, social and family histories were reviewed and updated asappropriate. Current Outpatient Medications   Medication Sig Dispense Refill    lisinopril-hydroCHLOROthiazide (PRINZIDE;ZESTORETIC) 20-12.5 MG per tablet take 1 tablet by mouth once daily 90 tablet 1    omeprazole (PRILOSEC) 40 MG delayed release capsule take 1 capsule by mouth every morning before breakfast (Patient taking differently: Indications: gets OTC ) 30 capsule 5     No current facility-administered medications for this visit. Past Medical History:   Diagnosis Date    Arthritis     osteoarthritis    Hypertension       Past Surgical History:   Procedure Laterality Date    CHOLECYSTECTOMY, LAPAROSCOPIC  2012    DILATION AND CURETTAGE OF UTERUS  1982    HIP ARTHROPLASTY Left     AR TOTAL HIP ARTHROPLASTY Left 11/14/2017    HIP TOTAL ARTHROPLASTY ANTERIOR APPROACH (MEDACTA, FASCIA ILIACA BLOCK VS. LUMBAR PLEXUS PRE-OP, SPINAL VS. GENERAL, MEDACTA TABLE, C-ARM) performed by Baltazar Young DO at [address] [place] [address] Bilateral     as child (lazy eye?? )    TUBAL LIGATION       Family History   Problem Relation Age of Onset    Heart Disease Mother     Diabetes Father     Diabetes Brother      Social History     Tobacco Use    Smoking status: Never Smoker    Smokeless tobacco: Never Used   Substance Use Topics    Alcohol use: Yes     Comment: 1 glass per month      Allergies   Allergen Reactions    Allopurinol     Morphine Other (See Comments)     Does not like how it makes her feel       Subjective:      Review of Systems   Gastrointestinal: Negative for bowel incontinence. Genitourinary: Negative for bladder incontinence. Musculoskeletal: Positive for back pain. Neurological: Negative for paresthesias. Other pertinent ROS in HPI  Objective:     /70 (Site: Left Upper Arm, Position: Sitting, Cuff Size: Large Adult)   Pulse 100   Wt 227 lb (103 kg)   SpO2 97%   BMI 36.64 kg/m²    Physical Exam  Constitutional:       Appearance: She is well-developed. HENT:      Right Ear: External ear normal.      Left Ear: External ear normal.      Nose: Nose normal.   Cardiovascular:      Rate and Rhythm: Normal rate and regular rhythm. Heart sounds: Normal heart sounds, S1 normal and S2 normal.   Pulmonary:      Effort: Pulmonary effort is normal. No respiratory distress. Breath sounds: Normal breath sounds. Musculoskeletal:         General: No deformity. Cervical back: Full passive range of motion without pain and normal range of motion. Lumbar back: Tenderness (to paraspinal muscles bilaterally) present. No deformity or bony tenderness. Decreased range of motion. Skin:     General: Skin is warm and dry. Neurological:      Mental Status: She is alert and oriented to person, place, and time. Assessment/PLAN   1. Lumbar pain    - [facility]  - cyclobenzaprine (FLEXERIL) 5 MG tablet; Take 1 tablet by mouth 2 times daily as needed for Muscle spasms  Dispense: 10 tablet;  Refill: 0      RTO if symptoms worsen or fail to improve  Pt agreeable with plan      Patient given educational materials - see patientinstructions. Discussed use, benefit, and side effects of prescribed medications. All patient questions answered. Pt voiced understanding. Reviewed health maintenance. Instructed to continue current medications, diet andexercise. 1.  [facility] received counseling on the following healthy behaviors: nutrition, exercise and medication adherence  2. Patient given educationalmaterials when available - see patient instructions when applicable  3. Discussed use, benefit, and side effects of prescribed medications. Barriersto medication compliance addressed. All patient questions answered. Pt voiced understanding. 4.  Reviewed prior labs and health maintenance when applicable. 5.  Continue current medications, diet and exercise. CompletedRefills   Requested Prescriptions     Signed Prescriptions Disp Refills    cyclobenzaprine (FLEXERIL) 5 MG tablet 10 tablet 0     Sig: Take 1 tablet by mouth 2 times daily as needed for Muscle spasms       This note was transcribed using dictation with Dragon services. Efforts were made to correct any errors but some words may be misinterpreted.     Electronically signed by ZA Escobar CNP, CNP on 8/20/2021 at 10:26 AM

## 2021-08-06 NOTE — PROGRESS NOTES
Patient is present complaining of lower back pain x6-8 months  Patient states she has trouble getting out of bed in the morning, trouble bending  Patient states she has been using heat with relief, taking tylenol and using topical creams  States she is not having any pain today, states it is usually when she is up doing something

## 2021-08-12 ENCOUNTER — HOSPITAL ENCOUNTER (OUTPATIENT)
Dept: PHYSICAL THERAPY | Age: 71
Setting detail: THERAPIES SERIES
Discharge: HOME | End: 2021-08-12
Payer: MEDICARE

## 2021-08-12 PROCEDURE — 97110 THERAPEUTIC EXERCISES: CPT

## 2021-08-12 PROCEDURE — 97161 PT EVAL LOW COMPLEX 20 MIN: CPT

## 2021-08-12 NOTE — CONSULTS
Washing machine is on [x]  Main level   [] Second level   [] Basement     ADL/IADL Previous level of function Current level of function Who currently assists the patient with task   Bathing  [x] Independent  [] Assist [x] Independent  [] Assist    Dress/grooming [x] Independent  [] Assist [x] Independent  [] Assist    Transfer/mobility [x] Independent  [] Assist [x] Independent  [] Assist    Feeding [x] Independent  [] Assist [x] Independent  [] Assist    Toileting [x] Independent  [] Assist [x] Independent  [] Assist    Driving [x] Independent  [] Assist [x] Independent  [] Assist    Housekeeping [x] Independent  [] Assist [x] Independent  [] Assist    Grocery shop/meal prep [x] Independent  [] Assist [x] Independent  [] Assist      Gait Prior level of function Current level of function    [x] Independent  [] Assist [x] Independent  [] Assist   Device: [x] Independent [x] Independent    [] Straight Cane [] Quad cane [] Straight Cane [] Quad cane    [] Standard walker [] Rolling walker   [] 4 wheeled walker [] Standard walker [] Rolling walker   [] 4 wheeled walker    [] Wheelchair [] Wheelchair     Working:  [] Full-time  [] Part-time  [] Off d/t condition  [] Retired  [] Disability  [] N/A  Job/Employer:     Pain:  [x] Yes  [] No Location: low back  Pain Rating: (0-10 scale) 0/10, currently, 10/10 in the morning   Pain altered Tx:  [] Yes  [x] No  Action:    Objective: p = pain, L = Lacks      ROM  ° A/P STRENGTH  ROM    Left Right Left Right Cervical    Shoulder Flex     Flexion    Abduction     Extension    ER     Rotation L R    IR     Side bending L R   Elbow Flex          Ext          Wrist Flex         Ext     Lumbar    Hand      Flexion Full   Hip Flexion    4-/5 4/5 Extension Full   Ext   4-/5 4/5 Rotation L Full R Full   Abd   4-/5 4/5 Sidebend L Full R Full   Add     -------------------- --------- --------   ER     STRENGTH     IR     Abdominals 4-/5    Knee Flex    4+/5 Erector Spinae 4-/5    Ext 4+/5 Scapular L R   Ankle DF     -Scaption     PF      -Retraction     Inversion     -Horz Abd     Eversion     -Extension        Special Tests:   Positive:        Negative: SI cluster, SLR    OBSERVATION Comments   Posture Fwd head   Joint Alignment Anterior pelvic tilt   Gait Slow speed   Palpation Diffuse midline tenderness at L5/S1 and  bilateral SI joints   Edema No Deficit    Neurological No Deficit      Assessment: Patient presents with generalized midline low back pain and SI dysfunction. Patient will benefit form physical therapy to improve lumbar stability and hip strength to improve ADL tolerance and reduce morning pain. Problems:    [x] ? Pain: 0-10/10 low back pain   [x] ? Flexibility:left hip flexors  [x] ? Strength: glutes, abs  [x] ? Function: Oswestry 46% impaired  [] Other:    STG: (to be met in 10 treatments)  1. ? Pain: 2/10 morning low back pain. 2. ? Strength: 4+/5 B hip flexion and extension to demonstrate improved lumbo-pelvic stability  3. ? Function:Oswestry score to 26% impaired or better to improve ADLs. 4. Independent with Home Exercise Programs    Patient goals: Reduce low back pain and improve function. Rehab Potential:  [x] Good  [] Fair  [] Poor   Suggested Professional Referral:  [x] No  [] Yes:  Barriers to Goal Achievement[de-identified]  [x] No  [] Yes:  Domestic Concerns:  [x] No  [] Yes:    Pt. Education:  [x] Plans/Goals, Risks/Benefits discussed  [x] Home exercise program: See chart below  Method of Education: [x] Verbal  [x] Demo  [x] Written  Comprehension of Education:  [x] Verbalizes understanding. [x] Demonstrates understanding. [x] Needs Review. [] Demonstrates/verbalizes understanding of HEP/Ed previously given.      Treatment Plan:  [x] Therapeutic Exercise   51789  [x] Vasopneumatic cold with compression  45781    [x] Therapeutic Activity  26142 [x] Cold/hotpack    [x] Gait Training   28268 [x] Lumbar/Cervical Traction  [id number]   [x] Neuromuscular Re-education  43454 [x] Electrical Stimulation Unattended  [id number]   [x] Manual Therapy    70624 [x] Electrical Stimulation Attended  [id number]   [] Iontophoresis: 4 mg/mL Dexamethasone Sodium Phosphate  mAmin  29692 []  Medication allergies reviewed for use of   Dexamethasone Sodium Phosphate 4mg/ml with iontophoresis treatments. Pt is not allergic. Frequency:  2x/week for 10 visits    Todays Treatment:  Precautions:  Modalities:   Exercises:  Access Code: [email or web address]  URL: [email or web address]  Date: 08/12/2021  Prepared by: Tamie Armenta    Exercises  Prone Heel Squeeze - 1 x daily - 7 x weekly - 3 sets - 10 reps  Prone Gluteal Sets - 1 x daily - 7 x weekly - 3 sets - 10 reps  Static Prone on Elbows - 1 x daily - 7 x weekly - 5 reps - 30 hold  Supine Bridge - 1 x daily - 7 x weekly - 3 sets - 10 reps  Standing Lumbar Extension - 1 x daily - 7 x weekly - 3 sets - 10 reps  Standing Hip Extension with Chair - 1 x daily - 7 x weekly - 3 sets - 10 reps      Specific Instructions for next treatment: Hip and core strengthening, focus on left hip       Evaluation Complexity:  History (Personal factors, comorbidities) [] 0 [] 1-2 [] 3+   Exam (limitations, restrictions) [] 1-2 [x] 3 [] 4+   Clinical presentation (progression) [x] Stable [] Evolving  [] Unstable   Decision Making [x] Low [] Moderate [] High    [x] Low Complexity [] Moderate Complexity [] High Complexity       Treatment Charges: Mins Units   [x] Evaluation       [x]  Low       []  Moderate       []  High 25 1   []  Modalities     [x]  Ther Exercise 15 1   []  Manual Therapy     []  Ther Activities     []  Aquatics     []  Vasocompression     []  Other       TOTAL TREATMENT TIME: 40      Time in:1500    Time out:1540    Electronically signed by: Juan Rodríguez, PT

## 2021-08-17 ENCOUNTER — HOSPITAL ENCOUNTER (OUTPATIENT)
Dept: PHYSICAL THERAPY | Age: 71
Setting detail: THERAPIES SERIES
Discharge: HOME | End: 2021-08-17
Payer: MEDICARE

## 2021-08-17 PROCEDURE — 97110 THERAPEUTIC EXERCISES: CPT

## 2021-08-17 NOTE — FLOWSHEET NOTE
[x] [facility]  [address]  [phone]  F: [phone] [] [address]  [place] [address]   [address]  P: [phone]  F: [phone] [] [address] &  Therapy  [address]  P: [phone]  F: [phone] [] [address]  P: [phone]  F: [phone] [] [address]  [facility]   [address]   [place]: [phone]  F: [phone]      Physical Therapy Daily Treatment Note    Date:  2021  Patient Name:  Facundo Singh    :  1950  MRN: [id number]  Physician: Carlus Hammans, CNP                      Insurance: Medicare  Medical Diagnosis: Lumbar Pain M54.5                    Rehab Codes: M54.5   Onset Date: 21                               Next 's appt:     Visit# / total visits: 2/10; Progress note for Medicare patient due at visit 10     Cancels/No Shows: 0/0    Subjective:    Pain:  [x]? Yes  []? No   Location: low back      Pain Rating: (0-10 scale) 0/10   Pain altered Tx:  []? Yes  [x]? No  Action:  Comments: Patient arrives stating this is the first day she has not woke up in excruciating pain, denies pain at arrival. Reports adherence to HEP given at evaluation.     Objective:  Modalities:   Precautions:  Exercises:  Exercise Reps/ Time Weight/ Level Comments   Scifit 5m L2          Gastroc stretch 3x20\"  Wedge   Heel raises 2x10     Lumbar extension      Hip extension            Seated      LAQ 2x10     HS stretch 3x20\"           Supine      Bridge 2x10     LTR 10x5\"     Clamshell 2x10 Blue          Sidelying      Hip abduction 2x10     Clamshell 2x10 Blue          Prone      Glut squeeze 15x3\"     Hip extension 15x     Other:      Treatment Charges: Mins Units   []  Modalities [x]  Ther Exercise 40 3   []  Manual Therapy     []  Ther Activities     []  Aquatics     []  Vasocompression     []  Other     Total Treatment time 40 3       Assessment: [x] Progressing toward goals. Patient with good recall for previously issued HEP reviewed this date, educated to continue completing as able. Patient verbalizes minimal fatigue with new exercises completed and denies any onset or increased pain at end of treatment. [] No change. [x] Other: Demonstrates most weakness through posterior chain. [x] Patient would continue to benefit from skilled physical therapy services in order to: improve lumbar stability and hip strength to improve ADL tolerance and reduce morning pain. STG: (to be met in 10 treatments)  1. ? Pain: 2/10 morning low back pain. 2. ? Strength: 4+/5 B hip flexion and extension to demonstrate improved lumbo-pelvic stability  3. ? Function:Oswestry score to 26% impaired or better to improve ADLs. 4. Independent with Home Exercise Programs     Patient goals: Reduce low back pain and improve function.        Pt. Education:  [x] Yes  [] No  [x] Reviewed Prior HEP/Ed  Method of Education: [x] Verbal  [x] Demo  [] Written  Comprehension of Education:  [x] Verbalizes understanding. [x] Demonstrates understanding. [] Needs review. [x] Demonstrates/verbalizes HEP/Ed previously given. Plan: [x] Continue current frequency toward long and short term goals.     [x] Specific Instructions for subsequent treatments:     Frequency:  2x/week for 10 visits      Time In: 805 am            Time Out: 845 am    Electronically signed by:  Graciela Johnson PTA

## 2021-08-19 ENCOUNTER — HOSPITAL ENCOUNTER (OUTPATIENT)
Dept: PHYSICAL THERAPY | Age: 71
Setting detail: THERAPIES SERIES
Discharge: HOME | End: 2021-08-19
Payer: MEDICARE

## 2021-08-19 PROCEDURE — 97110 THERAPEUTIC EXERCISES: CPT

## 2021-08-19 NOTE — FLOWSHEET NOTE
[x] [facility]) - [facility]STEP [facility] &  Therapy  [address]  [phone]  F: [phone] [] [address]  [place] [address]   [address]  P: [phone]  F: [phone] [] [address] &  Therapy  [address]  P: [phone]  F: [phone] [] [address]  P: [phone]  F: [phone] [] [address]  [facility]   [address]   [place]: [phone]  F: [phone]      Physical Therapy Daily Treatment Note    Date:  2021  Patient Name:  Jeb Singh    :  1950  MRN: [id number]  Physician: Torrey Hughes CNP                      Insurance: Medicare  Medical Diagnosis: Lumbar Pain M54.5                    Rehab Codes: M54.5   Onset Date: 21                               Next 's appt:     Visit# / total visits: 3/10; Progress note for Medicare patient due at visit 10     Cancels/No Shows: 0/0    Subjective:    Pain:  [x]? Yes  []? No   Location: low back      Pain Rating: (0-10 scale) 0/10   Pain altered Tx:  []? Yes  [x]? No  Action:  Comments: Patient notes she should have taken a tylenol before bed after last treatment as she was very sore by midnight, but subsided into the next day. Arrives denying pain in low back.      Objective:  Modalities:   Precautions:  Exercises:  Exercise Reps/ Time Weight/ Level Comments   Scifit 5m L3          Gastroc stretch 3x20\"  Wedge   Heel raises 2x10     Lumbar extension 15x     Hip extension 2x10           Seated      LAQ 2x10     HS stretch 3x20\"     HS curls 15x Blue          Supine      Bridge 2x10     LTR 10x5\"     Clamshell 2x10 Blue    SLR 2x10           Sidelying   Rudy   Hip abduction 2x10     Clamshell 2x10 Blue          Prone      Glut squeeze 15x3\"     Quad stretch 3x20\"     Hip extension 15x     Other:      Treatment Charges: Mins Units   []  Modalities     [x]  Ther Exercise 45 3   []  Manual Therapy     []  Ther Activities     []  Aquatics     []  Vasocompression     []  Other     Total Treatment time 45 3       Assessment: [x] Progressing toward goals. Patient with complaints of increased soreness throughout treatment, however good tolerance to progressions made. Onset of cramping through jana HS with supine and prone exs with minimal relief with stretching. [] No change. [x] Other:   [x] Patient would continue to benefit from skilled physical therapy services in order to: improve lumbar stability and hip strength to improve ADL tolerance and reduce morning pain. STG: (to be met in 10 treatments)  1. ? Pain: 2/10 morning low back pain. 2. ? Strength: 4+/5 B hip flexion and extension to demonstrate improved lumbo-pelvic stability  3. ? Function:Oswestry score to 26% impaired or better to improve ADLs. 4. Independent with Home Exercise Programs     Patient goals: Reduce low back pain and improve function.        Pt. Education:  [x] Yes  [] No  [x] Reviewed Prior HEP/Ed  Method of Education: [x] Verbal  [x] Demo  [] Written  Comprehension of Education:  [x] Verbalizes understanding. [x] Demonstrates understanding. [] Needs review. [x] Demonstrates/verbalizes HEP/Ed previously given. Plan: [x] Continue current frequency toward long and short term goals.     [x] Specific Instructions for subsequent treatments: tband palloff press     Frequency:  2x/week for 10 visits      Time In: 800 am            Time Out: 850 am    Electronically signed by:  Aixa Rojas PTA

## 2021-08-24 ENCOUNTER — HOSPITAL ENCOUNTER (OUTPATIENT)
Dept: PHYSICAL THERAPY | Age: 71
Setting detail: THERAPIES SERIES
Discharge: HOME | End: 2021-08-24
Payer: MEDICARE

## 2021-08-24 PROCEDURE — 97110 THERAPEUTIC EXERCISES: CPT

## 2021-08-24 NOTE — FLOWSHEET NOTE
[x] [facility]) - [facility] TWELVESTEP [facility] &  Therapy  [address]  [phone]  F: [phone] [] [address]  [address]   [address]  P: [phone]  F: [phone] [] [address] &  Therapy  [address]  P: [phone]  F: [phone] [] [address]  P: [phone]  F: [phone] [] [address]  [facility]   [address]   [place]: [phone]  F: [phone]      Physical Therapy Daily Treatment Note    Date:  2021  Patient Name:  Milton Singh    :  1950  MRN: [id number]  Physician: Cary Dorsey CNP                      Insurance: Medicare  Medical Diagnosis: Lumbar Pain M54.5                    Rehab Codes: M54.5   Onset Date: 21                               Next 's appt:     Visit# / total visits: 4/10; Progress note for Medicare patient due at visit 10     Cancels/No Shows: 0/0    Subjective:    Pain:  [x]? Yes  []? No   Location: low back      Pain Rating: (0-10 scale) 0/10   Pain altered Tx:  []? Yes  [x]? No  Action:  Comments: Patient arrives stating just more soreness this date. Reports she has been having \"good days and bad days\", but bad days are improved as compared to prior to starting therapy. States she has a few errands to run after PT today, thus applied lidocaine patch to low back prior to leaving this morning.      Objective:  Modalities:   Precautions:  Exercises:  Exercise Reps/ Time Weight/ Level Comments   Scifit 5m L3          Gastroc stretch 3x20\"  Wedge   Heel raises 2x10     Lumbar extension 15x     Hip extension 2x10     Marches 2x10     Tband palloff  15x Blue          Seated      LAQ 2x10 1lb    HS stretch 3x20\"     HS curls 2x10 Blue          Supine      Bridge 2x10     LTR

## 2021-08-26 ENCOUNTER — HOSPITAL ENCOUNTER (OUTPATIENT)
Dept: PHYSICAL THERAPY | Age: 71
Setting detail: THERAPIES SERIES
Discharge: HOME | End: 2021-08-26
Payer: MEDICARE

## 2021-08-26 PROCEDURE — 97110 THERAPEUTIC EXERCISES: CPT

## 2021-08-26 NOTE — FLOWSHEET NOTE
2x10 Blue          Prone      Glut squeeze 15x3\"     Quad stretch 3x20\"     Hip extension 15x 1lb    Other:      Treatment Charges: Mins Units   []  Modalities     [x]  Ther Exercise 50 3   []  Manual Therapy     []  Ther Activities     []  Aquatics     []  Vasocompression     []  Other     Total Treatment time 50 3       Assessment: [x] Progressing toward goals. Minimal progressions this date for continued LE and core strengthening with good tolerance and no onset of pain or issues. Posterior chain strength is improving. [] No change. [] Other:   [x] Patient would continue to benefit from skilled physical therapy services in order to: improve lumbar stability and hip strength to improve ADL tolerance and reduce morning pain. STG: (to be met in 10 treatments)  1. ? Pain: 2/10 morning low back pain. 2. ? Strength: 4+/5 B hip flexion and extension to demonstrate improved lumbo-pelvic stability  3. ? Function:Oswestry score to 26% impaired or better to improve ADLs. 4. Independent with Home Exercise Programs     Patient goals: Reduce low back pain and improve function.        Pt. Education:  [x] Yes  [] No  [] Reviewed Prior HEP/Ed  Method of Education: [x] Verbal  [x] Demo  [x] Written  Comprehension of Education:  [x] Verbalizes understanding. [x] Demonstrates understanding. [x] Needs review. [] Demonstrates/verbalizes HEP/Ed previously given. HEP 8/24: LTR, prone quad stretch, prone glut iso, prone hip ext, SL hip extension     Plan: [x] Continue current frequency toward long and short term goals.     [x] Specific Instructions for subsequent treatments: tband ext, leg press     Frequency:  2x/week for 10 visits      Time In: 800 am            Time Out: 855 am    Electronically signed by:  Marilyn Catherine PTA

## 2021-08-31 ENCOUNTER — HOSPITAL ENCOUNTER (OUTPATIENT)
Dept: PHYSICAL THERAPY | Age: 71
Setting detail: THERAPIES SERIES
Discharge: HOME | End: 2021-08-31
Payer: MEDICARE

## 2021-08-31 DIAGNOSIS — M25.561 RIGHT KNEE PAIN, UNSPECIFIED CHRONICITY: Primary | ICD-10-CM

## 2021-08-31 PROCEDURE — 97110 THERAPEUTIC EXERCISES: CPT

## 2021-08-31 NOTE — FLOWSHEET NOTE
[x] [facility]) - [facility] TWELVESTEP [facility] &  Therapy  [address]  [phone]  F: [phone] [] [address]  [place] [address]   [address]  P: [phone]  F: [phone] [] [address] &  Therapy  [address]  P: [phone]  F: [phone] [] [address]  P: [phone]  F: [phone] [] [address]  [facility]   [address]   [place]: [phone]  F: [phone]      Physical Therapy Daily Treatment Note    Date:  2021  Patient Name:  Anjel Singh    :  1950  MRN: [id number]  Physician: Christel Ruiz CNP                      Insurance: Medicare  Medical Diagnosis: Lumbar Pain M54.5                    Rehab Codes: M54.5   Onset Date: 21                               Next 's appt:     Visit# / total visits: 6/10; Progress note for Medicare patient due at visit 10     Cancels/No Shows: 0/0    Subjective:    Pain:  [x]? Yes  []? No   Location: low back      Pain Rating: (0-10 scale) 0/10   Pain altered Tx:  []? Yes  [x]? No  Action:  Comments: States she is tired today, hasn't slept well for three nights. Notes more soreness in the back, but attributes it to being more tired.      Objective:  Modalities:   Precautions:  Exercises:  Exercise Reps/ Time Weight/ Level Comments   Scifit 5m L3          Gastroc stretch 3x20\"  Wedge   HS stretch 3x20\"  Stool    Heel raises 2x10 2lb    Lumbar extension 15x     3 way hip 2x10 2lb    Marches 2x10 2lb    Tband palloff  2x10 Blue    Tband ext 2x10 Blue Added          Seated      LAQ 2x10 2lb    HS stretch   Modified to standing   HS curls 2x10 Blue          Supine      Bridge 2x10     LTR 10x5\"     Clamshell  Blue    SLR 2x10 2lb          Sidelying   Rudy   Hip abduction 2x10 2lb    Clamshell 2x10 Blue          Prone      Glut squeeze 15x3\"     Quad stretch 3x20\"     Hip extension 2x10 2lb    Other:      Treatment Charges: Mins Units   []  Modalities     [x]  Ther Exercise 50 3   []  Manual Therapy     []  Ther Activities     []  Aquatics     []  Vasocompression     []  Other     Total Treatment time 50 3       Assessment: [x] Progressing toward goals. Progressed ankle weight resistance for mat program as well as added resistance to standing program with no issues this date. Patient with fatigue with added resistance, but no onset of pain. [] No change. [] Other:   [x] Patient would continue to benefit from skilled physical therapy services in order to: improve lumbar stability and hip strength to improve ADL tolerance and reduce morning pain. STG: (to be met in 10 treatments)  1. ? Pain: 2/10 morning low back pain. 2. ? Strength: 4+/5 B hip flexion and extension to demonstrate improved lumbo-pelvic stability  3. ? Function:Oswestry score to 26% impaired or better to improve ADLs. 4. Independent with Home Exercise Programs     Patient goals: Reduce low back pain and improve function.        Pt. Education:  [x] Yes  [] No  [] Reviewed Prior HEP/Ed  Method of Education: [x] Verbal  [x] Demo  [x] Written  Comprehension of Education:  [x] Verbalizes understanding. [x] Demonstrates understanding. [x] Needs review. [] Demonstrates/verbalizes HEP/Ed previously given. HEP 8/24: LTR, prone quad stretch, prone glut iso, prone hip ext, SL hip extension     Plan: [x] Continue current frequency toward long and short term goals.     [x] Specific Instructions for subsequent treatments: leg press     Frequency:  2x/week for 10 visits      Time In: 805 am            Time Out: 855 am    Electronically signed by:  Stephen Weinstein PTA

## 2021-09-02 ENCOUNTER — HOSPITAL ENCOUNTER (OUTPATIENT)
Dept: PHYSICAL THERAPY | Age: 71
Setting detail: THERAPIES SERIES
Discharge: HOME | End: 2021-09-02
Payer: MEDICARE

## 2021-09-02 ENCOUNTER — OFFICE VISIT (OUTPATIENT)
Dept: ORTHOPEDIC SURGERY | Age: 71
End: 2021-09-02
Payer: MEDICARE

## 2021-09-02 VITALS — HEIGHT: 66 IN | WEIGHT: 226 LBS | BODY MASS INDEX: 36.32 KG/M2

## 2021-09-02 DIAGNOSIS — M17.11 PRIMARY OSTEOARTHRITIS OF RIGHT KNEE: Primary | ICD-10-CM

## 2021-09-02 PROCEDURE — 1090F PRES/ABSN URINE INCON ASSESS: CPT | Performed by: STUDENT IN AN ORGANIZED HEALTH CARE EDUCATION/TRAINING PROGRAM

## 2021-09-02 PROCEDURE — G8399 PT W/DXA RESULTS DOCUMENT: HCPCS | Performed by: STUDENT IN AN ORGANIZED HEALTH CARE EDUCATION/TRAINING PROGRAM

## 2021-09-02 PROCEDURE — G8417 CALC BMI ABV UP PARAM F/U: HCPCS | Performed by: STUDENT IN AN ORGANIZED HEALTH CARE EDUCATION/TRAINING PROGRAM

## 2021-09-02 PROCEDURE — 3017F COLORECTAL CA SCREEN DOC REV: CPT | Performed by: STUDENT IN AN ORGANIZED HEALTH CARE EDUCATION/TRAINING PROGRAM

## 2021-09-02 PROCEDURE — 97110 THERAPEUTIC EXERCISES: CPT

## 2021-09-02 PROCEDURE — 4040F PNEUMOC VAC/ADMIN/RCVD: CPT | Performed by: STUDENT IN AN ORGANIZED HEALTH CARE EDUCATION/TRAINING PROGRAM

## 2021-09-02 PROCEDURE — 1036F TOBACCO NON-USER: CPT | Performed by: STUDENT IN AN ORGANIZED HEALTH CARE EDUCATION/TRAINING PROGRAM

## 2021-09-02 PROCEDURE — 1123F ACP DISCUSS/DSCN MKR DOCD: CPT | Performed by: STUDENT IN AN ORGANIZED HEALTH CARE EDUCATION/TRAINING PROGRAM

## 2021-09-02 PROCEDURE — G8427 DOCREV CUR MEDS BY ELIG CLIN: HCPCS | Performed by: STUDENT IN AN ORGANIZED HEALTH CARE EDUCATION/TRAINING PROGRAM

## 2021-09-02 PROCEDURE — 99213 OFFICE O/P EST LOW 20 MIN: CPT | Performed by: STUDENT IN AN ORGANIZED HEALTH CARE EDUCATION/TRAINING PROGRAM

## 2021-09-02 NOTE — PROGRESS NOTES
mucosa moist. No perioral lesions  Pulm: Respirations unlabored and regular. Skin: warm, well perfused  Psych:   Patient has good fund of knowledge and displays understanging of exam, diagnosis, and plan. Radiology:   History:   Right knee degenerative osteoarthritis    Comparison:   Radiographs from November 2019 available for comparison    Findings:   Four weightbearing views of the right knee including AP, oblique, lateral and sunrise radiographs demonstrate no acute fracture, dislocation or osseous abnormalities. Moderate degenerative osteoarthritis noted with near complete joint space narrowing of the medial compartment. There is subchondral sclerosis and osteophyte formation of the medial compartment. Large calcification at the inferior pole of the patella near the intercondylar notch appears to have matured compared to previous imaging. Impression:  End-stage degenerative osteoarthritis of the right knee. Assessment:      1. Primary osteoarthritis of right knee       Plan:   1. Discussed the history and natural etiology of degenerative osteoarthritis. Patient again states she is not ready for a total knee replacement. She did not receive enough relief from her previous injection to attempt another injection with attempted aspiration of her joint. 2. We discussed other treatment options as the patient is unable to take anti-inflammatory medications due to previous ulcer. We recommended brace wear which may provide more stability and compression to reduce the symptoms from her swelling. 3. The ultimate treatment for her swelling will eventually be total knee replacement once she is ready. 4. Brace provided today. She is encouraged to call or return to the office with questions or concerns. All questions were answered to her satisfaction, she is aware, understands and voices her willingness to proceed as discussed. Follow up:Return if symptoms worsen or fail to improve.     No orders of the defined types were placed in this encounter. No orders of the defined types were placed in this encounter.       Electronically signed by Oj Travis DO   Orthopedic Surgery Resident PGY-5  [place] [address]  9/2/2021 at 9:42 AM

## 2021-09-02 NOTE — FLOWSHEET NOTE
Sidelying   Rudy   Hip abduction 2x10 2lb    Clamshell 2x10 Blue          Prone      Glut squeeze 15x3\"     Quad stretch 3x20\"     Hip extension 2x10 2lb    Other:      Treatment Charges: Mins Units   []  Modalities     [x]  Ther Exercise 42 3   []  Manual Therapy     []  Ther Activities     []  Aquatics     []  Vasocompression     []  Other     Total Treatment time 42 mins        Assessment: [x] Progressing toward goals. Continued exercises per log this date with cues for exercise progressions and reps/hold times. Patient with good knowledge of exercise techniques. No noted onset of pain this date. [] No change. [] Other:   [x] Patient would continue to benefit from skilled physical therapy services in order to: improve lumbar stability and hip strength to improve ADL tolerance and reduce morning pain. STG: (to be met in 10 treatments)  1. ? Pain: 2/10 morning low back pain. 2. ? Strength: 4+/5 B hip flexion and extension to demonstrate improved lumbo-pelvic stability  3. ? Function:Oswestry score to 26% impaired or better to improve ADLs. 4. Independent with Home Exercise Programs     Patient goals: Reduce low back pain and improve function.        Pt. Education:  [x] Yes  [] No  [] Reviewed Prior HEP/Ed  Method of Education: [x] Verbal  [x] Demo  [x] Written  Comprehension of Education:  [x] Verbalizes understanding. [x] Demonstrates understanding. [x] Needs review. [] Demonstrates/verbalizes HEP/Ed previously given. HEP 8/24: LTR, prone quad stretch, prone glut iso, prone hip ext, SL hip extension     Plan: [x] Continue current frequency toward long and short term goals.     [x] Specific Instructions for subsequent treatments: leg press     Frequency:  2x/week for 10 visits      Time In: 2097 am            Time Out: 1404 am    Electronically signed by:  Liat Alston PTA

## 2021-09-07 ENCOUNTER — HOSPITAL ENCOUNTER (OUTPATIENT)
Dept: PHYSICAL THERAPY | Age: 71
Setting detail: THERAPIES SERIES
Discharge: HOME | End: 2021-09-07
Payer: MEDICARE

## 2021-09-07 PROCEDURE — 97110 THERAPEUTIC EXERCISES: CPT

## 2021-09-07 NOTE — FLOWSHEET NOTE
[x] [facility]) - [facility] TWELVESTEP [facility] &  Therapy  [address]  [phone]  F: [phone] [] [address]  [address]   [address]  P: [phone]  F: [phone] [] [address] &  Therapy  [address]  P: [phone]  F: [phone] [] [address]  P: [phone]  F: [phone] [] [address]  [facility]   [address]   [place]: [phone]  F: [phone]      Physical Therapy Daily Treatment Note    Date:  2021  Patient Name:  Steffi Singh    :  1950  MRN: [id number]  Physician: Fady Hernandez CNP                      Insurance: Medicare  Medical Diagnosis: Lumbar Pain M54.5                    Rehab Codes: M54.5   Onset Date: 21                               Next 's appt:     Visit# / total visits: 8/10; Progress note for Medicare patient due at visit 10     Cancels/No Shows: 0/0    Subjective:    Pain:  [x]? Yes  []? No   Location: low back      Pain Rating: (0-10 scale) 4/10   Pain altered Tx:  []? Yes  [x]? No  Action:  Comments: Patient arrives stating has had more soreness in the back as of late, but notes it is still improved compared to prior to therapy. States she took two tylenol already this morning and reports adherence to HEP, notes 4/10 pain. Did not have knee drained at appt last week, frustrated with this.      Objective:  Modalities:   Precautions:  Exercises:  Exercise Reps/ Time Weight/ Level Comments   Scifit 6 m L3          Gastroc stretch 3x20\"  Wedge   HS stretch   3x20\"  Stool    Heel raises   2x10 2lb    Lumbar extension 15x     3 way hip   2x10 2lb    Marches 2x10 2lb    Tband palloff  2x10 Blue    Tband ext 2x10 Blue    Leg press  2x10 20 lbs Added 9/7         Seated      LAQ 2x10 2lb    HS stretch   Modified to standing   HS curls 2x10 Blue    SB rollout 10x3\"  Flexion - Added 9/7         Supine      Bridge 2x10     LTR 10x5\"     Clamshell  Blue    SLR 2x10 2lb          Sidelying   Rudy   Hip abduction 2x10 2lb    Clamshell 2x10 Blue          Prone      Glut squeeze 15x3\"     Quad stretch 3x20\"     Hip extension 2x10 2lb    Other:      Treatment Charges: Mins Units   []  Modalities     [x]  Ther Exercise 45 3   []  Manual Therapy     []  Ther Activities     []  Aquatics     []  Vasocompression     []  Other     Total Treatment time 45 mins 3       Assessment: [x] Progressing toward goals. Progressed to leg press this date for further LE strengthening to reduce stress off low back with good tolerance. Patient notes considerable improvement in symptoms post treatment. [] No change. [] Other:   [x] Patient would continue to benefit from skilled physical therapy services in order to: improve lumbar stability and hip strength to improve ADL tolerance and reduce morning pain. STG: (to be met in 10 treatments)  1. ? Pain: 2/10 morning low back pain. 2. ? Strength: 4+/5 B hip flexion and extension to demonstrate improved lumbo-pelvic stability  3. ? Function:Oswestry score to 26% impaired or better to improve ADLs. 4. Independent with Home Exercise Programs     Patient goals: Reduce low back pain and improve function.        Pt. Education:  [x] Yes  [] No  [] Reviewed Prior HEP/Ed  Method of Education: [x] Verbal  [x] Demo  [] Written  Comprehension of Education:  [x] Verbalizes understanding. [x] Demonstrates understanding. [] Needs review. [x] Demonstrates/verbalizes HEP/Ed previously given. HEP 8/24: LTR, prone quad stretch, prone glut iso, prone hip ext, SL hip extension     Plan: [x] Continue current frequency toward long and short term goals.     [x] Specific Instructions for subsequent treatments: issue new HEP     Frequency:  2x/week for 10 visits      Time In: 805 am Time Out: 850 am    Electronically signed by:  Robel Hare PTA

## 2021-09-09 ENCOUNTER — HOSPITAL ENCOUNTER (OUTPATIENT)
Dept: PHYSICAL THERAPY | Age: 71
Setting detail: THERAPIES SERIES
Discharge: HOME | End: 2021-09-09
Payer: MEDICARE

## 2021-09-09 PROCEDURE — 97110 THERAPEUTIC EXERCISES: CPT

## 2021-09-09 NOTE — FLOWSHEET NOTE
[x] [facility]) [facility]  [address]  [phone]  F: [phone] [] [address]  [address]   [address]  P: [phone]  F: [phone] [] [address] &  Therapy  [address]  P: [phone]  F: [phone] [] [address]  P: [phone]  F: [phone] [] [address]  [facility]   [address]   [place]: [phone]  F: [phone]      Physical Therapy Daily Treatment Note    Date:  2021  Patient Name:  Anjel Singh    :  1950  MRN: [id number]  Physician: Christel Ruiz CNP                      Insurance: Medicare  Medical Diagnosis: Lumbar Pain M54.5                    Rehab Codes: M54.5   Onset Date: 21                               Next 's appt:     Visit# / total visits: 9/10; Progress note for Medicare patient due at visit 10     Cancels/No Shows: 0/0    Subjective:    Pain:  [x]? Yes  []? No   Location: low back      Pain Rating: (0-10 scale) 0/10   Pain altered Tx:  []? Yes  [x]? No  Action:  Comments: Patient notes she is still mildly battling onset of vertigo from a week or so ago. Notes she slept on her hip wrong last night but other than that back is not bothering her this morning and has felt better since last treatment, denies pain.     Objective:  Modalities:   Precautions:  Exercises:  Exercise Reps/ Time Weight/ Level Comments   Scifit 6 m L3          Gastroc stretch 3x20\"  Wedge   HS stretch   3x20\"  Stool    Heel raises   2x10 2lb    Lumbar extension 15x     3 way hip   2x10 2lb    Marches 2x10 2lb    Tband palloff  2x10 Blue    Tband ext 2x10 Blue    Leg press  2x10 30 lbs          Seated      LAQ 2x10 2lb    HS stretch   Modified to standing   HS curls 2x10 Blue SB rollout 10x3\"  Flexion -          Supine      Bridge 2x10     LTR 10x5\"     Clamshell  Blue    SLR 2x10 2lb          Sidelying   Rudy   Hip abduction 2x10 2lb    Clamshell 2x10 Blue          Prone      Glut squeeze 15x3\"     Quad stretch 3x20\"     Hip extension 2x10 2lb    Other:      Treatment Charges: Mins Units   []  Modalities     [x]  Ther Exercise 50 3   []  Manual Therapy     []  Ther Activities     []  Aquatics     []  Vasocompression     []  Other     Total Treatment time 50 mins 3       Assessment: [x] Progressing toward goals. Patient with good recall for program and proper technique. Issued new written HEP and reviewed this date for carryover to home program and possible discharge to [facility] following next treatment. Patient with no onset of pain or symptoms throughout or post treatment. [] No change. [] Other:   [x] Patient would continue to benefit from skilled physical therapy services in order to: improve lumbar stability and hip strength to improve ADL tolerance and reduce morning pain. STG: (to be met in 10 treatments)  1. ? Pain: 2/10 morning low back pain. 2. ? Strength: 4+/5 B hip flexion and extension to demonstrate improved lumbo-pelvic stability  3. ? Function:Oswestry score to 26% impaired or better to improve ADLs. 4. Independent with Home Exercise Programs     Patient goals: Reduce low back pain and improve function.        Pt. Education:  [x] Yes  [] No  [] Reviewed Prior HEP/Ed  Method of Education: [x] Verbal  [x] Demo  [] Written  Comprehension of Education:  [x] Verbalizes understanding. [x] Demonstrates understanding. [] Needs review. [x] Demonstrates/verbalizes HEP/Ed previously given. HEP 8/24: LTR, prone quad stretch, prone glut iso, prone hip ext, SL hip extension  HEP 9/9: Standing - march, hip abd, hip ext, heel raise, shoulder tband ext, palloff press, lumbar extension     Plan: [x] Continue current frequency toward long and short term goals.     [x] Specific Instructions for subsequent treatments:     Frequency:  2x/week for 10 visits      Time In: 805 am            Time Out: 855 am    Electronically signed by:  Audi Hobson PTA

## 2021-09-13 DIAGNOSIS — I10 ESSENTIAL HYPERTENSION: ICD-10-CM

## 2021-09-13 RX ORDER — LISINOPRIL AND HYDROCHLOROTHIAZIDE 20; 12.5 MG/1; MG/1
TABLET ORAL
Qty: 90 TABLET | Refills: 1 | Status: SHIPPED | OUTPATIENT
Start: 2021-09-13 | End: 2022-03-08

## 2021-09-13 NOTE — TELEPHONE ENCOUNTER
Last visit: 8/6/21  Last Med refill: 3/22/21  Does patient have enough medication for 72 hours: Yes    Next Visit Date:  Future Appointments   Date Time Provider Jeovanny Pallavi   9/14/2021  8:30 AM Smita Celis PT [facility]   9/27/2021  8:10 AM Keshav Soni MD [address] Maintenance   Topic Date Due    Annual Wellness Visit (AWV)  Never done    Flu vaccine (1) 09/01/2021    A1C test (Diabetic or Prediabetic)  10/14/2021    Potassium monitoring  10/31/2021    Creatinine monitoring  10/31/2021    Breast cancer screen  07/10/2023    Lipid screen  10/14/2025    DTaP/Tdap/Td vaccine (2 - Td or Tdap) 08/21/2027    Colon cancer screen colonoscopy  02/28/2029    DEXA (modify frequency per FRAX score)  Completed    Shingles Vaccine  Completed    Pneumococcal 65+ years Vaccine  Completed    COVID-19 Vaccine  Completed    Hepatitis C screen  Completed    Hepatitis A vaccine  Aged Out    Hepatitis B vaccine  Aged Out    Hib vaccine  Aged Out    Meningococcal (ACWY) vaccine  Aged Out       Hemoglobin A1C (%)   Date Value   10/14/2020 5.9   02/11/2019 5.9   08/31/2017 5.7             ( goal A1C is < 7)   No results found for: LABMICR  LDL Cholesterol (mg/dL)   Date Value   10/14/2020 62   02/22/2019 56       (goal LDL is <100)   AST (U/L)   Date Value   07/26/2021 31     ALT (U/L)   Date Value   07/26/2021 34 (H)     BUN (mg/dL)   Date Value   10/31/2020 14     BP Readings from Last 3 Encounters:   08/06/21 120/70   03/29/21 112/75   03/22/21 134/84          (goal 120/80)    All Future Testing planned in [facility]  Lab Frequency Next Occurrence   ANALY DIGITAL SCREEN W OR WO CAD BILATERAL Once 10/03/2021   XR ABDOMEN (KUB) (SINGLE AP VIEW) Once 09/29/2021               Patient Active Problem List:     Essential hypertension     Osteopenia     Status post total replacement of left hip     Obesity (BMI 30-39. 9)     Hyperglycemia     High triglycerides

## 2021-09-14 ENCOUNTER — HOSPITAL ENCOUNTER (OUTPATIENT)
Dept: PHYSICAL THERAPY | Age: 71
Setting detail: THERAPIES SERIES
Discharge: HOME | End: 2021-09-14
Payer: MEDICARE

## 2021-09-14 PROCEDURE — 97110 THERAPEUTIC EXERCISES: CPT

## 2021-09-14 NOTE — DISCHARGE SUMMARY
[x] [facility]) [facility]  [facility]  [address]  [phone]  F: [phone] [] [address]  [address] [place] [facility]\"" [address]   [address]  P: [phone]  F: [phone] [] [address] &  Therapy  [address]  P: [phone]  F: [phone] [] [address]  P: [phone]  F: [phone] [] [address]  [address] [facility]   [address]   [place]: [phone]  F: [phone]      Physical Therapy Discharge Note    Date: 2021      Patient: Mario Alberto Marshall  : 1950  MRN: [id number]    Physician: Keke Kevin CNP                      Insurance: Medicare  Medical Diagnosis: Lumbar Pain M54.5                    Rehab Codes: M54.5   Onset Date: 21                               Next 's appt:   Visit# / total visits: 10/10; Progress note for Medicare patient due at visit 10                                  Cancels/No Shows: 0/0   Date of initial visit:                       Subjective:  Pain:  [x]? ? Yes  []? ? No   Location: low back      Pain Rating: (0-10 scale) 0/10   Pain altered Tx:  []? ? Yes  [x]? ? No  Action:  Comments: Patient  reports her low back is doing well slight right sided SI soreness. Objective:  Test Measurements:  MMT: 4+/5 B hip flexion and extension   Function: Oswestry score 22% impaired    Assessment:  Patient symptoms have progressed well with PT. Her low back pain is minor and controlled with exercise. All therapeutic goals have been met. Recommend discharge to [facility].       STG: (to be met in 10 treatments)  1. ? Pain: 2/10 morning low back pain. MET  2. ? Strength: 4+/5 B hip flexion and extension to demonstrate improved lumbo-pelvic stability MET  3. ? Function:Oswestry score to 26% impaired or better to improve ADLs.  MET  4. Independent with Home Exercise Programs MET       Treatment to Date:  [x] Therapeutic Exercise    [] Modalities:  [] Therapeutic Activity     [] Ultrasound  [] Electrical Stimulation  [] Gait Training     [] Massage       [] Lumbar/Cervical Traction  [] Neuromuscular Re-education [] Cold/hotpack [] Iontophoresis: 4 mg/mL  [x] Instruction in Home Exercise Program                     Dexamethasone Sodium  [] Manual Therapy             Phosphate 40-80 mAmin  [] Aquatic Therapy                   [] Vasocompression/    [] Other:             Game Ready    Discharge Status:     [x] Pt recovered from conditions. Treatment goals were met. [] Pt received maximum benefit. No further therapy indicated at this time. [] Pt to continue exercise/home instructions independently. [] Therapy interrupted due to:    [] Pt has 2 or more no shows/cancels, is discontinued per our policy. [] Pt has completed prescribed number of treatment sessions. [] Other:         Electronically signed by Radha Silva PT on 9/14/2021 at 9:06 AM      If you have any questions or concerns, please don't hesitate to call.   Thank you for your referral.

## 2021-09-14 NOTE — FLOWSHEET NOTE
[x] [facility]) - [facility] &  Therapy  [address]  [phone]  F: [phone] [] [address]  [address]   [address]  P: [phone]  F: [phone] [] [address] &  Therapy  [address]  P: [phone]  F: [phone] [] [address]  P: [phone]  F: [phone] [] [address]  [facility]   [address]   [place]: [phone]  F: [phone]      Physical Therapy Daily Treatment Note    Date:  2021  Patient Name:  Sydnie Esquivel    :  1950  MRN: [id number]  Physician: Tanmay Thao CNP                      Insurance: Medicare  Medical Diagnosis: Lumbar Pain M54.5                    Rehab Codes: M54.5   Onset Date: 21                               Next 's appt:     Visit# / total visits: 10/10; Progress note for Medicare patient due at visit 10     Cancels/No Shows: 0/0     Subjective:    Pain:  [x]? Yes  []? No   Location: low back      Pain Rating: (0-10 scale) 0/10   Pain altered Tx:  []? Yes  [x]? No  Action:  Comments: Patient  reports her low back is doing well slight right sided SI soreness.      Objective:  Modalities:   Precautions:  Exercises:  Exercise Reps/ Time Weight/ Level Comments   Scifit 6 m L3          Gastroc stretch 3x20\"  Wedge   HS stretch   3x20\"  Stool    Heel raises   2x10 2lb    Lumbar extension 15x     3 way hip   2x10 2lb    Marches 2x10 2lb    Tband palloff  2x10 Blue    Tband ext 2x10 Blue    Leg press  2x10 30 lbs          Seated      LAQ 2x10 2lb    HS stretch   Modified to standing   HS curls 2x10 Blue    SB rollout 10x3\"  Flexion -          Supine      Bridge 2x10     LTR 10x5\"     Clamshell  Blue    SLR 2x10 2lb          Sidelying   Rudy   Hip abduction 2x10 2lb    Clamshell 2x10 Blue          Prone      Glut squeeze 15x3\"     Quad stretch 3x20\"     Hip extension 2x10 2lb    Other:      Treatment Charges: Mins Units   []  Modalities     [x]  Ther Exercise 45 3   []  Manual Therapy     []  Ther Activities     []  Aquatics     []  Vasocompression     []  Other     Total Treatment time 45 mins 3       Assessment: [x] Progressing toward goals. See Discharge note. [] Other:   [x] Patient would continue to benefit from skilled physical therapy services in order to: improve lumbar stability and hip strength to improve ADL tolerance and reduce morning pain. STG: (to be met in 10 treatments)  1. ? Pain: 2/10 morning low back pain. MET  2. ? Strength: 4+/5 B hip flexion and extension to demonstrate improved lumbo-pelvic stabilityMET  3. ? Function:Oswestry score to 26% impaired or better to improve ADLs. 4. Independent with Home Exercise Programs MET     Patient goals: Reduce low back pain and improve function.        Pt. Education:  [x] Yes  [] No  [] Reviewed Prior HEP/Ed  Method of Education: [x] Verbal  [x] Demo  [] Written  Comprehension of Education:  [x] Verbalizes understanding. [x] Demonstrates understanding. [] Needs review. [x] Demonstrates/verbalizes HEP/Ed previously given. HEP 8/24: LTR, prone quad stretch, prone glut iso, prone hip ext, SL hip extension  HEP 9/9: Standing - march, hip abd, hip ext, heel raise, shoulder tband ext, palloff press, lumbar extension     Plan: [x] Continue current frequency toward long and short term goals.     [x] Specific Instructions for subsequent treatments:     Frequency:  2x/week for 10 visits      Time In: 815 am            Time Out: 0900 am    Electronically signed by:  Bryan Pino, PT

## 2021-09-15 ENCOUNTER — HOSPITAL ENCOUNTER (OUTPATIENT)
Dept: ULTRASOUND IMAGING | Age: 71
Setting detail: OUTPATIENT SURGERY
Discharge: HOME | End: 2021-09-17
Attending: INTERNAL MEDICINE
Payer: MEDICARE

## 2021-09-15 ENCOUNTER — ANESTHESIA (OUTPATIENT)
Dept: ENDOSCOPY | Age: 71
End: 2021-09-15
Payer: MEDICARE

## 2021-09-15 ENCOUNTER — ANESTHESIA EVENT (OUTPATIENT)
Dept: ENDOSCOPY | Age: 71
End: 2021-09-15
Payer: MEDICARE

## 2021-09-15 ENCOUNTER — HOSPITAL ENCOUNTER (OUTPATIENT)
Age: 71
Setting detail: OUTPATIENT SURGERY
Discharge: HOME HEALTH | End: 2021-09-15
Attending: INTERNAL MEDICINE | Admitting: INTERNAL MEDICINE
Payer: MEDICARE

## 2021-09-15 VITALS
SYSTOLIC BLOOD PRESSURE: 92 MMHG | OXYGEN SATURATION: 99 % | DIASTOLIC BLOOD PRESSURE: 64 MMHG | RESPIRATION RATE: 23 BRPM

## 2021-09-15 VITALS
HEART RATE: 84 BPM | WEIGHT: 200 LBS | OXYGEN SATURATION: 97 % | TEMPERATURE: 97 F | SYSTOLIC BLOOD PRESSURE: 115 MMHG | RESPIRATION RATE: 16 BRPM | BODY MASS INDEX: 33.32 KG/M2 | HEIGHT: 65 IN | DIASTOLIC BLOOD PRESSURE: 82 MMHG

## 2021-09-15 DIAGNOSIS — R10.9 ABDOMINAL PAIN, UNSPECIFIED ABDOMINAL LOCATION: ICD-10-CM

## 2021-09-15 PROCEDURE — 88342 IMHCHEM/IMCYTCHM 1ST ANTB: CPT

## 2021-09-15 PROCEDURE — 3700000001 HC ADD 15 MINUTES (ANESTHESIA): Performed by: INTERNAL MEDICINE

## 2021-09-15 PROCEDURE — 7100000011 HC PHASE II RECOVERY - ADDTL 15 MIN: Performed by: INTERNAL MEDICINE

## 2021-09-15 PROCEDURE — 88305 TISSUE EXAM BY PATHOLOGIST: CPT

## 2021-09-15 PROCEDURE — 76975 GI ENDOSCOPIC ULTRASOUND: CPT

## 2021-09-15 PROCEDURE — 3700000000 HC ANESTHESIA ATTENDED CARE: Performed by: INTERNAL MEDICINE

## 2021-09-15 PROCEDURE — 2500000003 HC RX 250 WO HCPCS: Performed by: NURSE ANESTHETIST, CERTIFIED REGISTERED

## 2021-09-15 PROCEDURE — 3609020800 HC EGD W/EUS FNA: Performed by: INTERNAL MEDICINE

## 2021-09-15 PROCEDURE — 6360000002 HC RX W HCPCS: Performed by: NURSE ANESTHETIST, CERTIFIED REGISTERED

## 2021-09-15 PROCEDURE — 2580000003 HC RX 258: Performed by: INTERNAL MEDICINE

## 2021-09-15 PROCEDURE — 2709999900 HC NON-CHARGEABLE SUPPLY: Performed by: INTERNAL MEDICINE

## 2021-09-15 PROCEDURE — 3609012400 HC EGD TRANSORAL BIOPSY SINGLE/MULTIPLE: Performed by: INTERNAL MEDICINE

## 2021-09-15 PROCEDURE — 88341 IMHCHEM/IMCYTCHM EA ADD ANTB: CPT

## 2021-09-15 PROCEDURE — 2720000010 HC SURG SUPPLY STERILE: Performed by: INTERNAL MEDICINE

## 2021-09-15 PROCEDURE — 7100000010 HC PHASE II RECOVERY - FIRST 15 MIN: Performed by: INTERNAL MEDICINE

## 2021-09-15 PROCEDURE — 88173 CYTOPATH EVAL FNA REPORT: CPT

## 2021-09-15 RX ORDER — SODIUM CHLORIDE 9 MG/ML
INJECTION, SOLUTION INTRAVENOUS CONTINUOUS
Status: DISCONTINUED | OUTPATIENT
Start: 2021-09-15 | End: 2021-09-15 | Stop reason: HOSPADM

## 2021-09-15 RX ORDER — LIDOCAINE HYDROCHLORIDE 10 MG/ML
INJECTION, SOLUTION EPIDURAL; INFILTRATION; INTRACAUDAL; PERINEURAL PRN
Status: DISCONTINUED | OUTPATIENT
Start: 2021-09-15 | End: 2021-09-15 | Stop reason: SDUPTHER

## 2021-09-15 RX ORDER — PROPOFOL 10 MG/ML
INJECTION, EMULSION INTRAVENOUS CONTINUOUS PRN
Status: DISCONTINUED | OUTPATIENT
Start: 2021-09-15 | End: 2021-09-15 | Stop reason: SDUPTHER

## 2021-09-15 RX ADMIN — SODIUM CHLORIDE: 9 INJECTION, SOLUTION INTRAVENOUS at 13:32

## 2021-09-15 RX ADMIN — PROPOFOL 135 MCG/KG/MIN: 10 INJECTION, EMULSION INTRAVENOUS at 15:21

## 2021-09-15 RX ADMIN — LIDOCAINE HYDROCHLORIDE 50 MG: 10 INJECTION, SOLUTION EPIDURAL; INFILTRATION; INTRACAUDAL; PERINEURAL at 15:21

## 2021-09-15 ASSESSMENT — PAIN - FUNCTIONAL ASSESSMENT: PAIN_FUNCTIONAL_ASSESSMENT: 0-10

## 2021-09-15 ASSESSMENT — PAIN SCALES - GENERAL
PAINLEVEL_OUTOF10: 0

## 2021-09-15 NOTE — ANESTHESIA PRE PROCEDURE
Department of Anesthesiology  Preprocedure Note       Name:  Alida Iraheta   Age:  73 y.o.  :  1950                                          MRN:  [id number]         Date:  9/15/2021      Surgeon: Madisyn Chin):  Merari Kendall MD    Procedure: Procedure(s):  ENDOSCOPIC ULTRASOUND, PATHOLOGY REQUESTED- [facility] NOTIFIED    Medications prior to admission:   Prior to Admission medications    Medication Sig Start Date End Date Taking? Authorizing Provider   lisinopril-hydroCHLOROthiazide (PRINZIDE;ZESTORETIC) 20-12.5 MG per tablet take 1 tablet by mouth once daily 21   ZA Vega CNP   omeprazole (PRILOSEC) 40 MG delayed release capsule take 1 capsule by mouth every morning before breakfast  Patient taking differently: Indications: gets OTC  20   ZA Vega CNP       Current medications:    Current Facility-Administered Medications   Medication Dose Route Frequency Provider Last Rate Last Admin    0.9 % sodium chloride infusion   IntraVENous Continuous Merari Kendall MD           Allergies: Allergies   Allergen Reactions    Allopurinol Other (See Comments)     \"made gout worse\"    Morphine Other (See Comments)     Does not like how it makes her feel       Problem List:    Patient Active Problem List   Diagnosis Code    Essential hypertension I10    Osteopenia M85.80    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 9) E66.9    Hyperglycemia R73.9    High triglycerides E78.1       Past Medical History:        Diagnosis Date    Arthritis     osteoarthritis    Hypertension        Past Surgical History:        Procedure Laterality Date    CHOLECYSTECTOMY, LAPAROSCOPIC      DILATION AND CURETTAGE OF UTERUS  1982    HIP ARTHROPLASTY Left     IN TOTAL HIP ARTHROPLASTY Left 2017    HIP TOTAL ARTHROPLASTY ANTERIOR APPROACH (MEDACTA, FASCIA ILIACA BLOCK VS. LUMBAR PLEXUS PRE-OP, SPINAL VS. GENERAL, MEDACTA Applicable): No results found for: PREGTESTUR, PREGSERUM, HCG, HCGQUANT     ABGs: No results found for: PHART, PO2ART, COF8BHM, HAQ6LSJ, BEART, R7RPUKOW     Type & Screen (If Applicable):  No results found for: LABABO, LABRH    Drug/Infectious Status (If Applicable):  Lab Results   Component Value Date    HEPCAB NONREACTIVE 08/31/2017       COVID-19 Screening (If Applicable): No results found for: COVID19        Anesthesia Evaluation  Patient summary reviewed  Airway: Mallampati: III  TM distance: >3 FB   Neck ROM: full  Mouth opening: > = 3 FB Dental: normal exam         Pulmonary:Negative Pulmonary ROS and normal exam                               Cardiovascular:    (+) hypertension:,         Rhythm: regular  Rate: normal                    Neuro/Psych:   Negative Neuro/Psych ROS              GI/Hepatic/Renal: Neg GI/Hepatic/Renal ROS            Endo/Other: Negative Endo/Other ROS                    Abdominal:             Vascular: negative vascular ROS. Other Findings:             Anesthesia Plan      MAC     ASA 3       Induction: intravenous. Anesthetic plan and risks discussed with patient. Use of blood products discussed with patient whom. Plan discussed with CRNA.                   ZA Freeman - PIPPA   9/15/2021

## 2021-09-15 NOTE — H&P
History and Physical    Pt Name: Gunnar Garnett  MRN: [id number]  YOB: 1950  Date of evaluation: 9/15/2021    SUBJECTIVE:   History of Chief Complaint:    Patient presents preprocedure for EUS. She states that she was detected to have a gastric \"nodule\" after having EGD and colonoscopy approximately four years ago. She says that she had been having heartburn and GERD at the time. She says that a nodule was detected, was to have EUS then but this was delayed due to COVID and other factors. She says that she was told recently that it has not gotten larger, but is still present. She has been scheduled for EUS today. Past Medical History    has a past medical history of Arthritis, Gastric nodule, and Hypertension. Past Surgical History   has a past surgical history that includes Cholecystectomy, laparoscopic (2012); Tubal ligation; Dilation and curettage of uterus (1982); Strabismus surgery (Bilateral); pr total hip arthroplasty (Left, 11/14/2017); Upper gastrointestinal endoscopy; and Colonoscopy. Medications  Prior to Admission medications    Medication Sig Start Date End Date Taking? Authorizing Provider   lisinopril-hydroCHLOROthiazide (PRINZIDE;ZESTORETIC) 20-12.5 MG per tablet take 1 tablet by mouth once daily 9/13/21  Yes ZA Corado - CNP   omeprazole (PRILOSEC) 40 MG delayed release capsule take 1 capsule by mouth every morning before breakfast  Patient taking differently: Indications: gets OTC  8/25/20  Yes Rochemarilyn So, APRN - CNP     Allergies  is allergic to allopurinol and morphine. Family History  family history includes Diabetes in her brother and father; Heart Disease in her mother. Social History   reports that she has never smoked. She has never used smokeless tobacco.   reports current alcohol use. reports no history of drug use.   Marital Status   Occupation none    REVIEW OF SYSTEMS    Constitutional: [] fever  [] chills  [] weight loss []weakness [x] negative  Eyes:  [] photophobia  [] discharge [] acuity change   [] Diplopia   [x] negative  HENT:  [] sore throat  [] ear pain [] Tinnitus   [x] negative  Respiratory:  [] Cough  [] Shortness of breath   [] Sputum   [x] negative  Cardiac: []Chest pain   []Palpitations []Edema  []PND  [] negative  GI:  []Abdominal pain   []Nausea  []Vomiting  []Diarrhea  [] negative  gerd  :  [] Dysuria   []Frequency  []Hematuria  []Discharge  [x] negative  Musculoskeletal:  []Back pain  []Neck pain  []Recent Injury [x] negative  Skin:  []Rash  [] Itching  [x] negative  Neurologic:  [] Headache  [] Focal weakness  [] Sensory changes [x]negative  Endocrine:  [] Polyuria  [] Polydipsia  [] Hair Loss  [x] negative  Lymphatic:   [] Swollen glands [x] negative  Psychiatric:  [] Depression  [] Suicidal ideation  [] Homicidal ideation  [] Anxiety [x] negative  All systems negative except as marked. OBJECTIVE:   VITALS:  height is 5' 5\" (1.651 m) and weight is 200 lb (90.7 kg). Her temporal temperature is 95.7 °F (35.4 °C). Her blood pressure is 153/104 (abnormal) and her pulse is 97. Her respiration is 17 and oxygen saturation is 96%. CONSTITUTIONAL:alert & cooperative, no acute distress. SKIN:  Warm and dry, no rashes on exposed areas of skin. HEAD:  Normocephalic, atraumatic   EYES: EOMs intact. EARS:  Hearing grossly WNL. NOSE:  Nares patent. No rhinorrhea. MOUTH/THROAT:  benign  NECK:supple, no lymphadenopathy  LUNGS: Clear to auscultation bilaterally, no wheezes. CARDIOVASCULAR: Heart sounds are normal.  Regular rate and rhythm without murmur. ABDOMEN: soft, non tender, non distended. Rotund. EXTREMITIES: no edema bilateral lower extremities. IMPRESSIONS:   1. Gastric/fundus nodule  2.  has a past medical history of Arthritis, Gastric nodule, and Hypertension.    PLANS:   1. BRI Dorsey PA-C  Electronically signed 9/15/2021 at 1:44 PM

## 2021-09-15 NOTE — OP NOTE
EGD/EUS      Patient:   Pavan Singh    :    1950    Facility:   [facility]   Referring/PCP: ZA Hawthorne CNP    Procedure:   Esophagogastroduodenoscopy --diagnostic, with biopsy and Endoscopic ultrasound with FNB of gastric mass. Date:     9/15/2021   Endoscopist:  Abdifatah Brothers MD     Indication: Recurrent epigastric abdominal pain, gastric nodule seen on prior EGD. Anesthesia:  MAC    Complications: None    Estimated blood loss: Minimal    Specimen collected: Yes. Description of Procedure:  Informed consent was obtained from the patient after explanation of the procedure including indications, description of the procedure,  benefits and possible risks and complications of the procedure, and alternatives. Questions were answered. The patient's history was reviewed and a directed physical examination was performed prior to the procedure. Patient was monitored throughout the procedure with pulse oximetry and periodic assessment of vital signs. Patient was sedated as noted above. With the patient in the left lateral decubitus position, the Olympus videoendoscope followed by endoechoendoscope was placed in the patient's mouth and under direct visualization passed into the esophagus. The scope was passed to the 2nd portion of the duodenum. The patient tolerated the procedure well and was taken to the recovery area in good condition. EGD Findings:  Esophagus: normal.   Stomach: Medium side sub-epithelial nodule with normal overlying gastric mucosa in the cardia of the stomach. Biopsied                      Otherwise normal stomach seen on retroflexion and direct examination  Duodenum: Normal.    EUS findings:  Pancreas: Normal appearing pancreas. PD with hyperechoic ductal walls. PD in the head 1.8 mm in the body 1.1 mm and in detail 1 mm at maximum cross-sectional diameter. . No signs of chronic pancreatitis. CBD: Normal, no stones, sludge.  CBD

## 2021-09-17 LAB
SURGICAL PATHOLOGY REPORT: NORMAL
SURGICAL PATHOLOGY REPORT: NORMAL

## 2021-09-22 ENCOUNTER — OFFICE VISIT (OUTPATIENT)
Dept: FAMILY MEDICINE CLINIC | Age: 71
End: 2021-09-22
Payer: MEDICARE

## 2021-09-22 VITALS
SYSTOLIC BLOOD PRESSURE: 134 MMHG | OXYGEN SATURATION: 95 % | DIASTOLIC BLOOD PRESSURE: 80 MMHG | HEART RATE: 95 BPM | WEIGHT: 226 LBS | BODY MASS INDEX: 37.61 KG/M2

## 2021-09-22 DIAGNOSIS — M54.50 LUMBAR PAIN: Primary | ICD-10-CM

## 2021-09-22 DIAGNOSIS — Z23 NEED FOR INFLUENZA VACCINATION: ICD-10-CM

## 2021-09-22 DIAGNOSIS — Z12.31 ENCOUNTER FOR SCREENING MAMMOGRAM FOR BREAST CANCER: ICD-10-CM

## 2021-09-22 DIAGNOSIS — R42 DIZZINESS: ICD-10-CM

## 2021-09-22 PROCEDURE — 1036F TOBACCO NON-USER: CPT | Performed by: NURSE PRACTITIONER

## 2021-09-22 PROCEDURE — G8427 DOCREV CUR MEDS BY ELIG CLIN: HCPCS | Performed by: NURSE PRACTITIONER

## 2021-09-22 PROCEDURE — 1090F PRES/ABSN URINE INCON ASSESS: CPT | Performed by: NURSE PRACTITIONER

## 2021-09-22 PROCEDURE — G0008 ADMIN INFLUENZA VIRUS VAC: HCPCS | Performed by: NURSE PRACTITIONER

## 2021-09-22 PROCEDURE — 99214 OFFICE O/P EST MOD 30 MIN: CPT | Performed by: NURSE PRACTITIONER

## 2021-09-22 PROCEDURE — 1123F ACP DISCUSS/DSCN MKR DOCD: CPT | Performed by: NURSE PRACTITIONER

## 2021-09-22 PROCEDURE — 90694 VACC AIIV4 NO PRSRV 0.5ML IM: CPT | Performed by: NURSE PRACTITIONER

## 2021-09-22 PROCEDURE — G8399 PT W/DXA RESULTS DOCUMENT: HCPCS | Performed by: NURSE PRACTITIONER

## 2021-09-22 PROCEDURE — 3017F COLORECTAL CA SCREEN DOC REV: CPT | Performed by: NURSE PRACTITIONER

## 2021-09-22 PROCEDURE — G8417 CALC BMI ABV UP PARAM F/U: HCPCS | Performed by: NURSE PRACTITIONER

## 2021-09-22 PROCEDURE — 4040F PNEUMOC VAC/ADMIN/RCVD: CPT | Performed by: NURSE PRACTITIONER

## 2021-09-22 RX ORDER — HYDROCODONE BITARTRATE AND ACETAMINOPHEN 5; 325 MG/1; MG/1
1 TABLET ORAL EVERY 8 HOURS PRN
Qty: 15 TABLET | Refills: 0 | Status: SHIPPED | OUTPATIENT
Start: 2021-09-22 | End: 2021-09-27

## 2021-09-22 RX ORDER — ONDANSETRON 4 MG/1
4 TABLET, ORALLY DISINTEGRATING ORAL 2 TIMES DAILY PRN
Qty: 10 TABLET | Refills: 0 | Status: SHIPPED | OUTPATIENT
Start: 2021-09-22

## 2021-09-22 ASSESSMENT — ENCOUNTER SYMPTOMS
COUGH: 0
SHORTNESS OF BREATH: 0

## 2021-09-22 NOTE — PATIENT INSTRUCTIONS
Patient Education        Vertigo: Exercises  Introduction  Here are some examples of exercises for you to try. The exercises may be suggested for a condition or for rehabilitation. Start each exercise slowly. Ease off the exercises if you start to have pain. You will be told when to start these exercises and which ones will work best for you. How to do the exercises  Exercise 1   1. Stand with a chair in front of you and a wall behind you. If you begin to fall, you may use them for support. 2. Stand with your feet together and your arms at your sides. 3. Move your head up and down 10 times. Exercise 2   1. Move your head side to side 10 times. Exercise 3   1. Move your head diagonally up and down 10 times. Exercise 4   1. Move your head diagonally up and down 10 times on the other side. Follow-up care is a key part of your treatment and safety. Be sure to make and go to all appointments, and call your doctor if you are having problems. It's also a good idea to know your test results and keep a list of the medicines you take. Where can you learn more? Go to https://[employer]. org and sign in to your [employer] account. Enter F349 in the [facility] [facility] box to learn more about \"Vertigo: Exercises. \"     If you do not have an account, please click on the \"Sign Up Now\" link. Current as of: December 2, 2020               Content Version: 12.9  © 4533-2154 [employer]. Care instructions adapted under license by [facility] ([facility]). If you have questions about a medical condition or this instruction, always ask your healthcare professional. [address] any warranty or liability for your use of this information.

## 2021-09-22 NOTE — PROGRESS NOTES
[address] GARCIA  [phone]    Marcelino Guerrero is a 73 y.o. female who presents today for her  medical conditions/complaints as noted below. Kanika Roland Francisco is c/o of Back Pain and Dizziness  . HPI:     HPI   Back pain is better after pt but still there. It really bothers her some days. She would like further eval for this issue as it is not resolving. Back Pain  This is a recurrent problem. The current episode started more than 1 month ago. The problem occurs intermittently. The problem has been waxing and waning since onset. The pain is present in the lumbar spine and sacro-iliac. Pertinent negatives include no bladder incontinence, bowel incontinence, paresis or paresthesias. She has tried analgesics for the symptoms. Pt had an episode of dizziness. Started when she bent down to get something out of the vaccum head. Then started happening every time she rolled over in bed. This has happened before. It has resolved. She did discuss this issue when she was doing pt for her back. They recommended a vestibular therapist for her. Had covid in November 2020. Has had some sob every since. Does feel it is getting better. Her back pain is preventing her from getting exercise and she feels this is making it worse. Her weight is stable. No edema. Wt Readings from Last 3 Encounters:   09/22/21 226 lb (102.5 kg)   09/15/21 200 lb (90.7 kg)   09/02/21 226 lb (102.5 kg)       Nursing note reviewed and discussed with patient. Patient's medications, allergies, past medical, surgical, social and family histories were reviewed and updated asappropriate.     Current Outpatient Medications   Medication Sig Dispense Refill    ondansetron (ZOFRAN-ODT) 4 MG disintegrating tablet Take 1 tablet by mouth 2 times daily as needed for Nausea or Vomiting 10 tablet 0    HYDROcodone-acetaminophen (NORCO) 5-325 MG per tablet Take 1 tablet by mouth every 8 hours as needed for Pain for up to 5 days. Intended supply: 5 days. Take lowest dose possible to manage pain 15 tablet 0    lisinopril-hydroCHLOROthiazide (PRINZIDE;ZESTORETIC) 20-12.5 MG per tablet take 1 tablet by mouth once daily 90 tablet 1    omeprazole (PRILOSEC) 40 MG delayed release capsule take 1 capsule by mouth every morning before breakfast (Patient taking differently: Indications: gets OTC ) 30 capsule 5     No current facility-administered medications for this visit. Past Medical History:   Diagnosis Date    Arthritis     osteoarthritis    Gastric nodule     patient reports    Hypertension       Past Surgical History:   Procedure Laterality Date    CHOLECYSTECTOMY, LAPAROSCOPIC  2012    COLONOSCOPY      DILATION AND CURETTAGE OF UTERUS  1982    ND TOTAL HIP ARTHROPLASTY Left 11/14/2017    HIP TOTAL ARTHROPLASTY ANTERIOR APPROACH (MEDACTA, FASCIA ILIACA BLOCK VS. LUMBAR PLEXUS PRE-OP, SPINAL VS. GENERAL, MEDACTA TABLE, C-ARM) performed by John Park DO at [address] Bilateral     as child (lazy eye?? )    TUBAL LIGATION      UPPER GASTROINTESTINAL ENDOSCOPY      UPPER GASTROINTESTINAL ENDOSCOPY  9/15/2021    EGD BIOPSY performed by Breanne Leon MD at [facility]    UPPER GASTROINTESTINAL ENDOSCOPY  9/15/2021    EGD W/EUS FNA performed by Breanne Leon MD at [facility]     Family History   Problem Relation Age of Onset    Heart Disease Mother     Diabetes Father     Diabetes Brother      Social History     Tobacco Use    Smoking status: Never Smoker    Smokeless tobacco: Never Used   Substance Use Topics    Alcohol use: Yes     Comment: 1 glass per month      Allergies   Allergen Reactions    Allopurinol Other (See Comments)     \"made gout worse\"    Morphine Other (See Comments)     Does not like how it makes her feel       Subjective:      Review of Systems   Constitutional: Negative for chills and fever. Respiratory: Negative for cough and shortness of breath. Cardiovascular: Negative for chest pain, palpitations and leg swelling. Other pertinent ROS in HPI  Objective:     /80 (Site: Left Upper Arm, Position: Sitting, Cuff Size: Large Adult)   Pulse 95   Wt 226 lb (102.5 kg)   SpO2 95%   BMI 37.61 kg/m²    Physical Exam  Constitutional:       Appearance: She is well-developed. HENT:      Right Ear: External ear normal.      Left Ear: External ear normal.      Nose: Nose normal.   Cardiovascular:      Rate and Rhythm: Normal rate and regular rhythm. Heart sounds: Normal heart sounds, S1 normal and S2 normal.   Pulmonary:      Effort: Pulmonary effort is normal. No respiratory distress. Breath sounds: Normal breath sounds. Musculoskeletal:         General: No deformity. Normal range of motion. Cervical back: Full passive range of motion without pain and normal range of motion. Skin:     General: Skin is warm and dry. Neurological:      Mental Status: She is alert and oriented to person, place, and time. Assessment/PLAN   1. Lumbar pain    - MRI LUMBAR SPINE WO CONTRAST; Future  - HYDROcodone-acetaminophen (NORCO) 5-325 MG per tablet; Take 1 tablet by mouth every 8 hours as needed for Pain for up to 5 days. Intended supply: 5 days. Take lowest dose possible to manage pain  Dispense: 15 tablet; Refill: 0    2. Need for influenza vaccination    - INFLUENZA, QUADV, ADJUVANTED, 65 YRS =, IM, PF, PREFILL SYR, 0.5ML (FLUAD)    3. [facility]      RTO if symptoms worsen or fail to improve  Pt agreeable with plan      Patient given educational materials - see patientinstructions. Discussed use, benefit, and side effects of prescribed medications. All patient questions answered. Pt voiced understanding. Reviewed health maintenance. Instructed to continue current medications, diet andexercise.     1.  Marty Moscoso received counseling on the following healthy behaviors: nutrition, exercise and medication adherence  2. Patient given educationalmaterials when available - see patient instructions when applicable  3. Discussed use, benefit, and side effects of prescribed medications. Barriersto medication compliance addressed. All patient questions answered. Pt voiced understanding. 4.  Reviewed prior labs and health maintenance when applicable. 5.  Continue current medications, diet and exercise. CompletedRefills   Requested Prescriptions     Signed Prescriptions Disp Refills    ondansetron (ZOFRAN-ODT) 4 MG disintegrating tablet 10 tablet 0     Sig: Take 1 tablet by mouth 2 times daily as needed for Nausea or Vomiting    HYDROcodone-acetaminophen (NORCO) 5-325 MG per tablet 15 tablet 0     Sig: Take 1 tablet by mouth every 8 hours as needed for Pain for up to 5 days. Intended supply: 5 days. Take lowest dose possible to manage pain       This note was transcribed using dictation with Dragon services. Efforts were made to correct any errors but some words may be misinterpreted.     Electronically signed by ZA Forde CNP, CNP on 9/22/2021 at 8:49 AM

## 2021-09-22 NOTE — PROGRESS NOTES
Patient states she had covid back in November and is still having shortness of breath    Patient states she is still having back pain  States she is going to PT but doesn't feel it is helping as much    Patient states for the last 3 weeks she was having dizziness  States every time she turned her head she would get dizzy  States this has improved     Vaccine Information Sheet, \"Influenza - Inactivated\"  given to [address] [facility], or parent/legal guardian of  [address] [facility] and verbalized understanding. Patient responses:    Have you ever had a reaction to a flu vaccine? No  Do you have any current illness? No  Have you ever had [place] Syndrome? No  Do you have a serious allergy to any of the following: Neomycin, Polymyxin, Thimerosal, eggs or egg products? No    Flu vaccine given per order. Please see immunization tab. Risks and benefits explained. Current VIS given.

## 2021-09-24 ENCOUNTER — HOSPITAL ENCOUNTER (OUTPATIENT)
Facility: CLINIC | Age: 71
Discharge: HOME | End: 2021-09-26
Payer: MEDICARE

## 2021-09-24 ENCOUNTER — HOSPITAL ENCOUNTER (OUTPATIENT)
Dept: GENERAL RADIOLOGY | Facility: CLINIC | Age: 71
Discharge: HOME | End: 2021-09-26
Payer: MEDICARE

## 2021-09-24 DIAGNOSIS — N20.0 KIDNEY STONE: ICD-10-CM

## 2021-09-24 PROCEDURE — 74018 RADEX ABDOMEN 1 VIEW: CPT

## 2021-09-27 ENCOUNTER — OFFICE VISIT (OUTPATIENT)
Dept: UROLOGY | Age: 71
End: 2021-09-27
Payer: MEDICARE

## 2021-09-27 VITALS
DIASTOLIC BLOOD PRESSURE: 81 MMHG | BODY MASS INDEX: 37.65 KG/M2 | SYSTOLIC BLOOD PRESSURE: 131 MMHG | TEMPERATURE: 96.8 F | RESPIRATION RATE: 17 BRPM | HEART RATE: 89 BPM | HEIGHT: 65 IN | WEIGHT: 226 LBS

## 2021-09-27 DIAGNOSIS — N20.0 KIDNEY STONE: Primary | ICD-10-CM

## 2021-09-27 PROCEDURE — 4040F PNEUMOC VAC/ADMIN/RCVD: CPT | Performed by: UROLOGY

## 2021-09-27 PROCEDURE — 1090F PRES/ABSN URINE INCON ASSESS: CPT | Performed by: UROLOGY

## 2021-09-27 PROCEDURE — 1123F ACP DISCUSS/DSCN MKR DOCD: CPT | Performed by: UROLOGY

## 2021-09-27 PROCEDURE — G8399 PT W/DXA RESULTS DOCUMENT: HCPCS | Performed by: UROLOGY

## 2021-09-27 PROCEDURE — 3017F COLORECTAL CA SCREEN DOC REV: CPT | Performed by: UROLOGY

## 2021-09-27 PROCEDURE — G8427 DOCREV CUR MEDS BY ELIG CLIN: HCPCS | Performed by: UROLOGY

## 2021-09-27 PROCEDURE — 1036F TOBACCO NON-USER: CPT | Performed by: UROLOGY

## 2021-09-27 PROCEDURE — G8417 CALC BMI ABV UP PARAM F/U: HCPCS | Performed by: UROLOGY

## 2021-09-27 PROCEDURE — 99213 OFFICE O/P EST LOW 20 MIN: CPT | Performed by: UROLOGY

## 2021-09-27 ASSESSMENT — ENCOUNTER SYMPTOMS
EYE PAIN: 0
SHORTNESS OF BREATH: 0
BACK PAIN: 0
ABDOMINAL PAIN: 0
VOMITING: 0
NAUSEA: 0
DIARRHEA: 0
CONSTIPATION: 0
COUGH: 0
WHEEZING: 0
EYE REDNESS: 0

## 2021-09-27 NOTE — PROGRESS NOTES
Diagnosis Date    Arthritis     osteoarthritis    Gastric nodule     patient reports    Hypertension      Past Surgical History:   Procedure Laterality Date    CHOLECYSTECTOMY, LAPAROSCOPIC  2012    COLONOSCOPY      DILATION AND CURETTAGE OF UTERUS  1982    AL TOTAL HIP ARTHROPLASTY Left 11/14/2017    HIP TOTAL ARTHROPLASTY ANTERIOR APPROACH (MEDACTA, FASCIA ILIACA BLOCK VS. LUMBAR PLEXUS PRE-OP, SPINAL VS. GENERAL, MEDACTA TABLE, C-ARM) performed by Myra Hope DO at [address] Bilateral     as child (lazy eye?? )    TUBAL LIGATION      UPPER GASTROINTESTINAL ENDOSCOPY      UPPER GASTROINTESTINAL ENDOSCOPY  9/15/2021    EGD BIOPSY performed by [facility]iki [facility]aur [facility] at [facility]    UPPER GASTROINTESTINAL ENDOSCOPY  9/15/2021    EGD W/EUS FNA performed by Kiki Kaur MD at [facility]     Family History   Problem Relation Age of Onset    Heart Disease Mother     Diabetes Father     Diabetes Brother      Outpatient Medications Marked as Taking for the 9/27/21 encounter (Office Visit) with Steph Arteaga MD   Medication Sig Dispense Refill    ondansetron (ZOFRAN-ODT) 4 MG disintegrating tablet Take 1 tablet by mouth 2 times daily as needed for Nausea or Vomiting 10 tablet 0    HYDROcodone-acetaminophen (NORCO) 5-325 MG per tablet Take 1 tablet by mouth every 8 hours as needed for Pain for up to 5 days. Intended supply: 5 days.  Take lowest dose possible to manage pain 15 tablet 0    lisinopril-hydroCHLOROthiazide (PRINZIDE;ZESTORETIC) 20-12.5 MG per tablet take 1 tablet by mouth once daily 90 tablet 1    omeprazole (PRILOSEC) 40 MG delayed release capsule take 1 capsule by mouth every morning before breakfast (Patient taking differently: Indications: gets OTC ) 30 capsule 5      (All medications reviewed and updated by provider sincelast office visit or hospitalization)   Allopurinol and Morphine  Social History     Tobacco Use   Smoking Status Never Smoker   Smokeless Tobacco Never Used      (If patient a smoker, smoking cessation counseling offered)     Social History     Substance and Sexual Activity   Alcohol Use Yes    Comment: 1 glass per month       REVIEW OF SYSTEMS:  Review of Systems      Physical Exam:      Vitals:    09/27/21 0804   BP: 131/81   Pulse: 89   Resp: 17   Temp: 96.8 °F (36 °C)     Body mass index is 37.61 kg/m². Patient is a 73 y.o. female in noacute distress and alert and oriented to person, place and time. Physical Exam  Constitutional: Patient in no acute distress. Neuro: Alert andoriented to person, place and time. Psych: Mood normal, affect normal        and Plan      1. Kidney stone           Plan:   Doing great  F/u prn     Return if symptoms worsen or fail to improve. Prescriptions Ordered:  No orders of the defined types were placed in this encounter. Orders Placed:  No orders of the defined types were placed in this encounter. Aubree Trevizo MD    Agree with the ROS entered by the MA.

## 2021-09-28 ENCOUNTER — HOSPITAL ENCOUNTER (OUTPATIENT)
Dept: MRI IMAGING | Facility: CLINIC | Age: 71
Discharge: HOME | End: 2021-09-30
Payer: MEDICARE

## 2021-09-28 DIAGNOSIS — M54.50 LUMBAR PAIN: ICD-10-CM

## 2021-09-28 PROCEDURE — 72148 MRI LUMBAR SPINE W/O DYE: CPT

## 2021-09-30 DIAGNOSIS — M48.00 CENTRAL STENOSIS OF SPINAL CANAL: Primary | ICD-10-CM

## 2021-10-12 ENCOUNTER — HOSPITAL ENCOUNTER (OUTPATIENT)
Dept: PHYSICAL THERAPY | Age: 71
Setting detail: THERAPIES SERIES
Discharge: HOME | End: 2021-10-12
Payer: MEDICARE

## 2021-10-12 PROCEDURE — 97530 THERAPEUTIC ACTIVITIES: CPT

## 2021-10-12 PROCEDURE — 95992 CANALITH REPOSITIONING PROC: CPT

## 2021-10-12 PROCEDURE — 97162 PT EVAL MOD COMPLEX 30 MIN: CPT

## 2021-10-12 NOTE — PROGRESS NOTES
Physical Therapy           [facility]) - [facility] DIVISION & Therapy  [address]: [address]     PHYSICAL THERAPY VESTIBULAR EVALUATION      Date:  10/12/2021  Patient: Doyle Mcneal  : 1950  MRN: [id number]   Referring Provider:  ZA Strong CNP  Insurance:  MEDICARE, Secondary Insurance [employer] - Follows medicare guidelines, Auth: NPRe  Medical Diagnosis/ Rehab Codes:   S53 (ICD-10-CM) - Dizziness  H81.1 -BPPV  R11.0 -NAUSEA  R26.81 -IMBALANCE  Onset date: 1.5 MONTH HISTORY  Next 's appt.: 2021    Subjective:   HPI: PATIENT REPORTS THIS PROBLEM BEGAN SUDDENLY ABOUT 6 WEEKS AGO WHILE SHE WAS BENDING OVER. SHE DENIES ILLNESS OR INJURY. SHE THINKS THIS PROBLEM IS DUE TO A STROKE SHE HAD. SHE HAD A SIMILAR EPISODE OF SEVERE SYMPTOMS ABOUT 5 YEARS AGO. SINCE THEN SHE HAS AVERAGED ABOUT 2 MILDER BOUTS ON AVERAGE PER YEAR. SHE STATES SHE HAS NOT BEEN ABLE TO SEEK TREATMENT FOR THIS PROBLEM BECAUSE HER SPOUSE WAS VERY ILL WITH MULTIPLE MEDICAL PROBLEM. HE PASSED AWAY 3/2021. SHE HAS A HISTORY OF SEVERE MOTION SENSITIVITY. CC:  PATIENT REPORTS BOUTS OF A SPINNING SENSATION BROUGHT ON BY QUICK HEAD MOTIONS, ROLLING BOTH DIRECTIONS IN THE BED R>L AND GETTING IN AND OUT OF BED. SHE IS NOT SURE HOW LONG A BOUT LASTS BECAUSE SHE IMMEDIATELY REPOSITIONS WHEN SHE FEEL THE VERTIGO COMING ON.    SHE HAS ASSOCIATED NAUSEA. SHE STATES SHE IS VERY CAUTIOUS WITH HEAD MOVEMENTS. SHE KEEPS HER HEAD UP WHEN BENDING TO AVOID PROVOKING SYMPTOMS. SHE HAS ASSOCIATED IMBALANCE. WHEN SHE GET OUT OF BED SHE HAS TO SIT ON THE EDGE AND ALLOW HER SYMPTOMS TO PASS. SHE NOTES IMBALANCE WHEN SHE GETS UP.     PMHx:    [] Unremarkable [] Diabetes  []MI/Heart Problems   [] Pacemaker  [x] HTN   [] Cancer   [] Arthritis:   [] Surgeries:   [x] Other:  BACK PAIN, STROKE, MOTION SENSITIVITY   has a past medical history of Arthritis, Gastric nodule, and Hypertension. has a past surgical history that includes Cholecystectomy, laparoscopic (2012); Tubal ligation; Dilation and curettage of uterus (1982); Strabismus surgery (Bilateral); pr total hip arthroplasty (Left, 11/14/2017); Upper gastrointestinal endoscopy; Colonoscopy; Upper gastrointestinal endoscopy (9/15/2021); and Upper gastrointestinal endoscopy (9/15/2021). Allergies: [] NKA  [x] Refer to intake sheet  Medication: [x] Refer to intake sheet  [] None   HAS ANTIEMETIC MEDICATION TODAY  Test: [] X-Ray  [] MRI [x] CT Scan   [] Other  CT HEAD 01/31/2921  Impression   Chronic involutional changes.  No acute disease. Pain:    /10  [x] No c/o pain    Pain altered Tx: [x] No  [] Yes Action:  Symptoms: [] Improving  [] Worsening  [] Same  Sleep: [x] OK  [] Disturbed    Fall History: DENIES FALLS IN THE LAST ONE YEAR BUT HAS HAD ABOUT 5 NEAR FALLS  Associated Ear Symptoms: HEARING IS NORMAL TO CONVERSATION, (+) TINNITUS BUT NOT NEW     Function:  PATIENT IS INDEP IN SELF CARE AND ADL'S. SHE LIVES ALONE WITH HER DOG, SHE IS RECENTLY . SHE IS ABLE TO DRIVE, SHE DOES ALL HER HOUSEWORK, COOKING AND SHOPPING. SHE IS ABLE TO DRIVE.     Subjective Measure Score Indications   Dizziness Handicap Inventory (DHI) 20/100 BPPV SUBSCALE: 10 /20   Activities-Specific Balance Confidence (ABC) 61% LOW BALANCE CONFIDENCE   Comment:    Objective:    OBSERVATION No Deficit Deficit Not Tested Comments   Posture       Forward Head [] [x] []    Head/Neck Alignment [] [x] []    Rounded Shoulders [] [x] []    Kyphosis [] [x] [] DECREASED   Lordosis [x] [] []    Lateral Shift [x] [] []    Scoliosis [] [] [x]    Iliac Crest [x] [] []    Genu Valgus [] [] [x]    Genu Varus [] [] [x]    Genu Recurvatum [] [] [x]    Other       Palpation [] [] [x]    Sensation [x] [] [] DENIES NUMBNESS   Edema [] [] [x]    Neurological Signs [] [] [x]    Deep Tendon Reflex [] [] [x]    Strength [x] [] [] NO MMT, FUNCTIONAL FOR AMB AND TRANSFERS       Cervical spine AROM:  Motion Percent Normal AROM Pain/Symptoms   Flexion             100       % [] Pain   [] Dizziness   [] Other:   Extension              50        % [] Pain   [] Dizziness   [] Other:   (R), (L) Rotation       B 100          % [] Pain   [] Dizziness   [] Other:   (R), (L) Side Bending         B  100        % [] Pain   [] Dizziness   [] Other:       BALANCE SCREENING: CONSIDER ANOTHER SESSION    Functional Gait Assessment (FGA): CONSIDER ANOTHER SESSION    Gait: NO GAIT DEFICIT OBSERVED      VESTIBULAR OCULOMOTOR SCREENING:  TEST ROOM LIGHT WITHOUT FIXATION-IR GOGGLES   MODIFIED VERTEBRAL ARTERY TEST IN SITTING NO REPORTS OF SYMPTOMS    SPONTANEOUS NYSTAGMUS NEGATIVE NEGATIVE   GAZE HOLDING NYSTAGMUS (L):  NEG (L):  NEG    (R):  NEG (R): NEG    UP: NEG UP: NEG   SMOOTH PURSUIT NEG    SACCADES NO DYSMETRIA, SLOW    VOR CANCELLATION NEG    VOR TO SLOW HEAD MOVEMENT NEG    VOR TO FAST HEAD MOVEMENT(HEAD THRUST) (L):NOT TESTED     (R):  NT    HEAD SHAKING NYSTAGMUS  NT   VALSALVA/  HYPERVENTILATION  NT   RIGHT BAL-HALLPIKE  DELAYED ONSET UP BEATING NYSTAGMUS WITH ROTATION TO THE RIGHT  PATIENT REPORTED VERTIGO   LEFT BAL-HALLPIKE  DELAYED ONSET UP BEATING NYSTAGMUS WITH ROTATION TO THE LEFT  PATIENT REPORTED VERTIGO   [facility]  TEST NEXT SESSION   ROLL TEST (L):   (L):  TEST NEXT SESSION    (R):   (R):  TESTE NEXT SESSION   BOW AND LEAN TEST  BOW: TEST PRN  LEAN: TEST PRN   SIDE LYING TEST (L):  (L):  NA ABLE TO TOLERATED BAL HALLPIKE    (R):   (R):  NA   NECK TORSION TEST BODY (R):   BODY (L):  BODY (R):  NA   BODY (L): NA   VISUAL ACUITY STATIC:  DYNAMIC: NA AT THIS TIME       Assessment:PATIENT HAD A POSITIVE LEFT AND RIGHT BAL HALLPIKE INDICATING R PC BPPV AND L PC BPPV. THE RIGHT IS THE MORE SYMPTOMATIC SIDE. HER TREATMENT IS COMPLICATED BY SEVERE MOTION SENSITIVITY AND RESULTING NAUSEA.   WOULD BENEFIT FROM CONTINUED PHYSICAL THERAPY TO ADDRESS THE PROBLEMS LISTED BELOW AND TO WORK TOWARDS REACHING THE STATED GOALS. Problems:                                                                                     [x]  Vertigo  [x]  Difficulty walking straight course                                     [x]  Positive [place] Hallpike/Roll Test for BPPV  []   Gait Dysfunction:                                            []  Static balance deficit: mCTSIB  []  Dynamic balance deficit: Rose Balance Scale Royann Mariner), Functional Gait Assessment (FGA)  [x]  ? Function: Dizziness Handicap Inventory [facility]):   [x]  Decreased Balance Confidence:   [x]  Other: SEVERE MOTION SENSITIVITY      Short Term Goals: (  3-5     Treatments)  [x] 1. Patient will report 7 days with no vertigo   [] 2. Patient will report --% improvement in dizziness and --% improvement in imbalance  [x] 3. Improve gait mechanics, trajectory  [x] 4. Negative [place] Hallpike and Roll Test for BPPV  [] 5. Improved static balance  [] 6. Improved dynamic balance: increased FGA ( /30)  [x] 7. Decreased Dizziness Handicap Inventory (DHI) (20 /100) BY 14  [] 8. Independent with Home Exercise Program (HEP)  [x] 9. Demonstrate knowledge of fall prevention, issue patient education literature regarding fall prevention. Long Term Goals: ( TBD Treatments)  [] 1. Patient goals: WALK WITHOUT FALLING     Rehab Potential:  [x] Good  [] Fair  [] Poor   Suggested Professional Referral:  [x] No  [] Yes:  Barriers to Goal Achievement[de-identified]  [] No  [x] Yes: MULTI CANAL BPPV IS MORE DIFFICULT TO TREAT  Domestic Concerns:  [x] No  [] Yes:      Pt. Education:    [x] Results of clinical test/Plans/Goals, Risks/Benefits discussed    [] Home exercise program  [] Fall prevention  Method of Education: [x] Verbal  [x] Demo  [x] Written  Comprehension of Education:  [x] [employer] understanding. [x] Demonstrates understanding. [] Needs Review.       Evaluation Complexity:  History (Personal factors, comorbidities) [] 0 [x] 1-2 [] 3+   Exam (limitations, restrictions) [] 1-2 [x] 3 [] 4+   Clinical presentation (progression) [x] Stable [] Evolving  [] Unstable   Decision Making [] Low [x] Moderate [] High    [] Low Complexity [x] Moderate Complexity [] High Complexity         Treatment Plan:  [x] Vestibular Rehab [] Iontophoresis: 4 mg/mL Dexamethasone Sodium Phosphate  mAmin   08094   [x] Therapeutic Activity  00380 [] Vasopneumatic cold with compression  50629    [] Gait Training   07884 [] Ultrasound   [id number]   [] Neuromuscular Re-education  72399 [] Electrical Stimulation Unattended  41901   [] Manual Therapy  61939 [] Electrical Stimulation Attended  91176   [x] Instruction in HEP  [] Lumbar/Cervical Traction  35616   [] Aquatic Therapy   81020 [] Cold/hotpack    [] Massage   95548      [] Dry Needling, 1 or 2 muscles  45184   [x] Canalith Repositioning Maneuver   65264 [] Therapeutic Exercise  81137     Frequency: 1 x/weeks for 3-5 visits  WILL ATTEMPT TO CLEAR PATIENTS BPPV AND THEN ADDRESS HER IMBALANCE. Todays Treatment:  Precautions: FALL RISK -HISTORY OF NEAR FALLS  Exercises:INSTRUCTION IN HOME EXERCISE PROGRAM NONE TODAY  Other:  CANALITH REPOSITIONING MANEUVER WITH HEAD SHAKE (MODIFIED EPLEY MANEUVER) WAS PERFORMED FOR RIGHT POSTERIOR CANAL BPPV. PATIENT TOOK HER ANTIEMETIC MEDICATION 01 MINUTES PRIOR. A SECOND REPOSITIONING MANEUVER AND RETEST WERE NOT PERFORMED THIS SESSION DUE TO PATIENTS REPORTS OF NAUSEA. POST MANEUVER INSTRUCTIONS WERE GIVEN INCLUDING KEEPING THE HEAD NEUTRAL FOR THE NEXT FOUR HOURS (NO LOOKING UP, BENDING OVER OR TIPPING HEAD) AND AVOID SLEEPING ON THE INVOLVED SIDE FOR 3-5 NIGHTS. THE PATIENT VERBALIZED UNDERSTANDING. THE PATIENT SAT FOR ABOUT 15 MINUTES AND THEN WAS ABLE TO WALK WITHOUT INCREASED DIFFICULTY, SHE DID REPORT MILD INCREASED IMBALANCE.      Specific Instructions for next treatment: REPEAT POSITION TESTS      Today's Treatment Charges        Mins       Units   [] PT Evaluation- [] Low; [x] Moderate; [] High  50 1   [x] Canalith repositioning maneuver/technique (CRM/T) 15 1   [] Therapeutic Exercise 15min     [x] Therapeutic Activity 10 1   [] Neuromuscular Reeducation     TOTAL 75 3       Start Time: 2267             Stop Time: 1440    Electronically signed by: Orly Adams PT        Physician Signature:________________________________Date:__________________  By signing above or cosigning this note, I have reviewed this plan of care and certify a need for medically necessary rehabilitation services.

## 2021-10-19 ENCOUNTER — APPOINTMENT (OUTPATIENT)
Dept: PHYSICAL THERAPY | Age: 71
End: 2021-10-19
Payer: MEDICARE

## 2021-10-26 ENCOUNTER — HOSPITAL ENCOUNTER (OUTPATIENT)
Dept: PHYSICAL THERAPY | Age: 71
Setting detail: THERAPIES SERIES
Discharge: HOME | End: 2021-10-26
Payer: MEDICARE

## 2021-10-26 PROCEDURE — 97530 THERAPEUTIC ACTIVITIES: CPT

## 2021-10-26 NOTE — PROGRESS NOTES
Physical Therapy    [address]  Outpatient Physical Therapy   [address]   Phone: [phone]   Fax: [phone]    Physical Therapy Daily Treatment Note      Date:  10/26/2021  Patient Name:  Dave Infante    :  1950  MRN: [id number]  Referring Provider:   ZA Cardoso CNP  Insurance:  MEDICARE, Secondary Insurance [employer] - Follows medicare guidelines, Auth: NPRe  Medical Diagnosis/ Rehab Codes:   C96 (ICD-10-CM) - Dizziness  H81.1 -BPPV  R11.0 -NAUSEA  R26.81 -IMBALANCE  Onset date: 1.5 MONTH HISTORY              Next 's appt.: 2021  Visit# / total visits: 2/  Cancels/No Shows: 0/0    Subjective:  PATIENT REPORTS SHE HAD A GOOD COUPLE GOOD DAYS FOLLOWING LAST SESSION BUT NOW HER VERTIGO HAS RETURNED AS INTENSE AS PREVIOUS. TODAY EXPERIENCING PAIN AT BASE OF THE NECK. STATES SHE IS STILL HAVING VERTIGO WITH ROLLING TO BOTH SIDES R>L. REPORTS SHE HAS SLEPT PROPPED UP FOR YEARS DUE TO VERTIGO IN SUPINE POSITION. Pain:  [x] Yes  [] No Location: LOWER NECK Pain Rating: (0-10 scale) 4-5/10  Pain altered Tx:  [] No  [x] Yes  Action: HEAD SUPPORTED DURING TESTING    Objective:  Precautions: SEVERE MOTION SENSITIVITY, FALL RISK -HISTORY OF NEAR FALLS    VESTIBULAR BPPV RE-CHECK  POSITION TESTING FOR BPPV IS COMPLICATED BY PATIENTS SEVERE MOTION SENSITIVITY. SHE TOOK ANTIEMETIC MEDICATION PRIOR TO TESTING TODAY. TEST ROOM LIGHT WITHOUT FIXATION-IR GOGGLES   SPONTANEOUS NYSTAGMUS  NEGATIVE   RIGHT BAL-HALLPIKE  SEE EVAL 10/12/2021 FOR RESULTS   LEFT BAL-HALLPIKE  SEE EVAL 10/12/2021 FOR RESULTS   SUPINE HEAD CENTER  UP BEATING NYSTAGMUS WITH ROTATION TO THE LEFT LASTING ABOUT 15\" OBSERVED. NYSTAGMUS WAS REDUCED WITH FIXATION. PATIENT REPORTED VERTIGO  (PATIENTS HEAD WAS ORIENTED IN TO THE LEFT AS SHE MOVED INTO THE TEST POSITION)   ROLL TEST  (R):  (R): APOGEOTROPIC HORIZONTAL NYSTAGMUS LASTING ABOUT 20\" OBSERVED.   NYSTAGMUS WAS REDUCED WITH FIXATION. PATIENT REPORTED SEVERE VERTIGO. (L):  (L): AS PATIENT STARTED TO ROLL TO  THE LEFT SHE HAD UP BEATING NYSTAGMUS WITH ROTATION TO THE LEFT. SHE REPORTED SEVERE VERTIGO AND NAUSEA. TESTING WAS STOPPED AT THIS POINT. BOW AND LEAN TEST BOW:  LEAN: BOW: NOT TESTED  LEAN:NT   SIDE LYING TEST (L):  (L): NT    (R):  (R): NT     Other: UNABLE TO ATTEMPT CANALITH REPOSITIONING MANEUVER TODAY DUE TO SEVERE SYMPTOMS INCLUDING NAUSEA. PATIENT HAD SEVERE SYMPTOMS WITH ESSENTIALLY LAYING IN HER BACK AND ROLLING TO THE LEFT. DISCUSSED WITH PATIENT SHE HAS POSITIVE POSITION TESTS FOR ALL THE TESTING WHICH HAS BEEN PERFORMED. THIS WOULD INDICATE R POSTERIOR CANAL BPPV, L POSTERIOR CANAL BPPV AND HORIZONTAL CANAL BPPV PROBABLE ON THE LEFT. DISCUSSED WITH PATIENT MULTI CANAL INVOLVEMENT IS RARE AND 3 CANAL INVOLVEMENT VERY RARE ESPECIALLY WITH NO HISTORY OF TRAUMA. DISCUSSED WITH PATIENT SHE WOULD BENEFIT FROM REFERRAL TO ENT FOR TESTING. PCP OFFICE WAS CONTACTED REGARDING BENEFIT OF ENT REFERRAL. Specific Instructions for next treatment: WILL HOLD THERAPY AT THIS TIME    Assessment:  WOULD BENEFIT FROM FURTHER TESTING BY ENT    [] Progressing toward goals. [] No change. [] Other:    [] Patient would continue to benefit from skilled physical therapy services in order to:     Problems:                                                                                     [x]? Vertigo  [x]? Difficulty walking straight course                                     [x]? Positive Analisa Hallpike/Roll Test for BPPV  []? Gait Dysfunction:                                            []?  Static balance deficit: mCTSIB  []? Dynamic balance deficit: Fish Meg Balance Scale Dulce Maria Dean), Functional Gait Assessment (FGA)  [x]? ? Function: Dizziness Handicap Inventory [facility]):   [x]? Decreased Balance Confidence:   [x]? Other: SEVERE MOTION SENSITIVITY        Short Term Goals: (  3-5     Treatments)  [x]?  1.

## 2021-10-27 ENCOUNTER — HOSPITAL ENCOUNTER (OUTPATIENT)
Age: 71
Setting detail: SPECIMEN
Discharge: HOME | End: 2021-10-27
Payer: MEDICARE

## 2021-10-27 ENCOUNTER — HOSPITAL ENCOUNTER (OUTPATIENT)
Dept: CT IMAGING | Facility: CLINIC | Age: 71
Discharge: HOME | End: 2021-10-29
Payer: MEDICARE

## 2021-10-27 DIAGNOSIS — D21.4 LEIOMYOMA OF STOMACH: ICD-10-CM

## 2021-10-27 LAB — POC CREATININE: 1.1 MG/DL (ref 0.6–1.4)

## 2021-10-27 PROCEDURE — 6360000004 HC RX CONTRAST MEDICATION: Performed by: INTERNAL MEDICINE

## 2021-10-27 PROCEDURE — 2580000003 HC RX 258: Performed by: INTERNAL MEDICINE

## 2021-10-27 PROCEDURE — 74177 CT ABD & PELVIS W/CONTRAST: CPT

## 2021-10-27 RX ORDER — SODIUM CHLORIDE 0.9 % (FLUSH) 0.9 %
10 SYRINGE (ML) INJECTION PRN
Status: DISCONTINUED | OUTPATIENT
Start: 2021-10-27 | End: 2021-10-30 | Stop reason: HOSPADM

## 2021-10-27 RX ORDER — 0.9 % SODIUM CHLORIDE 0.9 %
80 INTRAVENOUS SOLUTION INTRAVENOUS ONCE
Status: COMPLETED | OUTPATIENT
Start: 2021-10-27 | End: 2021-10-27

## 2021-10-27 RX ADMIN — SODIUM CHLORIDE, PRESERVATIVE FREE 10 ML: 5 INJECTION INTRAVENOUS at 09:12

## 2021-10-27 RX ADMIN — IOHEXOL 50 ML: 240 INJECTION, SOLUTION INTRATHECAL; INTRAVASCULAR; INTRAVENOUS; ORAL at 09:12

## 2021-10-27 RX ADMIN — IOPAMIDOL 100 ML: 755 INJECTION, SOLUTION INTRAVENOUS at 09:12

## 2021-10-27 RX ADMIN — SODIUM CHLORIDE 80 ML: 9 INJECTION, SOLUTION INTRAVENOUS at 09:12

## 2021-11-02 ENCOUNTER — TELEPHONE (OUTPATIENT)
Dept: FAMILY MEDICINE CLINIC | Age: 71
End: 2021-11-02

## 2021-11-02 DIAGNOSIS — M48.00 CENTRAL STENOSIS OF SPINAL CANAL: Primary | ICD-10-CM

## 2021-11-03 DIAGNOSIS — R42 DIZZINESS: Primary | ICD-10-CM

## 2021-11-05 ENCOUNTER — OFFICE VISIT (OUTPATIENT)
Dept: FAMILY MEDICINE CLINIC | Age: 71
End: 2021-11-05
Payer: MEDICARE

## 2021-11-05 VITALS
SYSTOLIC BLOOD PRESSURE: 136 MMHG | HEART RATE: 104 BPM | DIASTOLIC BLOOD PRESSURE: 88 MMHG | TEMPERATURE: 96.6 F | OXYGEN SATURATION: 97 %

## 2021-11-05 DIAGNOSIS — J01.10 SUBACUTE FRONTAL SINUSITIS: Primary | ICD-10-CM

## 2021-11-05 PROCEDURE — G8417 CALC BMI ABV UP PARAM F/U: HCPCS

## 2021-11-05 PROCEDURE — G8484 FLU IMMUNIZE NO ADMIN: HCPCS

## 2021-11-05 PROCEDURE — 99213 OFFICE O/P EST LOW 20 MIN: CPT

## 2021-11-05 PROCEDURE — G8399 PT W/DXA RESULTS DOCUMENT: HCPCS

## 2021-11-05 PROCEDURE — 1090F PRES/ABSN URINE INCON ASSESS: CPT

## 2021-11-05 PROCEDURE — G8427 DOCREV CUR MEDS BY ELIG CLIN: HCPCS

## 2021-11-05 PROCEDURE — 4040F PNEUMOC VAC/ADMIN/RCVD: CPT

## 2021-11-05 PROCEDURE — 3017F COLORECTAL CA SCREEN DOC REV: CPT

## 2021-11-05 PROCEDURE — 1036F TOBACCO NON-USER: CPT

## 2021-11-05 PROCEDURE — 1123F ACP DISCUSS/DSCN MKR DOCD: CPT

## 2021-11-05 RX ORDER — AMOXICILLIN AND CLAVULANATE POTASSIUM 875; 125 MG/1; MG/1
1 TABLET, FILM COATED ORAL 2 TIMES DAILY
Qty: 20 TABLET | Refills: 0 | Status: SHIPPED | OUTPATIENT
Start: 2021-11-05 | End: 2021-11-15

## 2021-11-05 RX ORDER — PREDNISONE 10 MG/1
10 TABLET ORAL 2 TIMES DAILY
Qty: 10 TABLET | Refills: 0 | Status: SHIPPED | OUTPATIENT
Start: 2021-11-05 | End: 2021-11-10

## 2021-11-05 ASSESSMENT — ENCOUNTER SYMPTOMS
RHINORRHEA: 1
SORE THROAT: 0
SINUS PRESSURE: 1
COUGH: 0
EYE ITCHING: 0
EYE REDNESS: 0

## 2021-11-05 NOTE — PATIENT INSTRUCTIONS
Finish the entire course of antibiotic treatment. Complete short course of oral steroid treatment. Increase fluid intake. Patient Education        Sinusitis: Care Instructions  Your Care Instructions     Sinusitis is an infection of the lining of the sinus cavities in your head. Sinusitis often follows a cold. It causes pain and pressure in your head and face. In most cases, sinusitis gets better on its own in 1 to 2 weeks. But some mild symptoms may last for several weeks. Sometimes antibiotics are needed. Follow-up care is a key part of your treatment and safety. Be sure to make and go to all appointments, and call your doctor if you are having problems. It's also a good idea to know your test results and keep a list of the medicines you take. How can you care for yourself at home? · Take an over-the-counter pain medicine, such as acetaminophen (Tylenol), ibuprofen (Advil, Motrin), or naproxen (Aleve). Read and follow all instructions on the label. · If the doctor prescribed antibiotics, take them as directed. Do not stop taking them just because you feel better. You need to take the full course of antibiotics. · Be careful when taking over-the-counter cold or flu medicines and Tylenol at the same time. Many of these medicines have acetaminophen, which is Tylenol. Read the labels to make sure that you are not taking more than the recommended dose. Too much acetaminophen (Tylenol) can be harmful. · Breathe warm, moist air from a steamy shower, a hot bath, or a sink filled with hot water. Avoid cold, dry air. Using a humidifier in your home may help. Follow the directions for cleaning the machine. · Use saline (saltwater) nasal washes. This can help keep your nasal passages open and wash out mucus and bacteria. You can buy saline nose drops at a grocery store or drugstore. Or you can make your own at home by adding 1 teaspoon of salt and 1 teaspoon of baking soda to 2 cups of distilled water.  If you make your own, fill a bulb syringe with the solution, insert the tip into your nostril, and squeeze gently. Glena Madura your nose. · Put a hot, wet towel or a warm gel pack on your face 3 or 4 times a day for 5 to 10 minutes each time. · Try a decongestant nasal spray like oxymetazoline (Afrin). Do not use it for more than 3 days in a row. Using it for more than 3 days can make your congestion worse. When should you call for help? Call your doctor now or seek immediate medical care if:    · You have new or worse swelling or redness in your face or around your eyes.     · You have a new or higher fever. Watch closely for changes in your health, and be sure to contact your doctor if:    · You have new or worse facial pain.     · The mucus from your nose becomes thicker (like pus) or has new blood in it.     · You are not getting better as expected. Where can you learn more? Go to [email or web address] org and sign in to your "[employer] account. Enter W470 in the Automated Insights box to learn more about \"Sinusitis: Care Instructions. \"     If you do not have an account, please click on the \"Sign Up Now\" link. Current as of: December 2, 2020               Content Version: 13.0  © 4426-1139 Healthwise, Incorporated. Care instructions adapted under license by [facility] ([facility]). If you have questions about a medical condition or this instruction, always ask your healthcare professional. [place] [address] any warranty or liability for your use of this information.

## 2021-11-05 NOTE — PROGRESS NOTES
[facility]  [facility]   [address] [address]    [facility]  [facility]   [address] [facility] [address]  Dept: [phone]    Bean Dao is a 73 y.o. female Established patient, who presents to the walk-in clinic today with conditions/complaints as noted below:    Chief Complaint   Patient presents with    Sinusitis     cheek bones hurts and throbbing onset today    Nasal Congestion     post nasal drip         HPI:     Sinusitis  This is a recurrent problem. The current episode started more than 1 month ago (a couple of months ago). The problem has been gradually worsening (this past week) since onset. There has been no fever. Her pain is at a severity of 7/10. Associated symptoms include congestion, headaches and sinus pressure. Pertinent negatives include no chills, coughing, ear pain or sore throat. Treatments tried: antihistamine, Neti Pot. The treatment provided mild relief.        Past Medical History:   Diagnosis Date    Arthritis     osteoarthritis    Gastric nodule     patient reports    Hypertension        Current Outpatient Medications   Medication Sig Dispense Refill    amoxicillin-clavulanate (AUGMENTIN) 875-125 MG per tablet Take 1 tablet by mouth 2 times daily for 10 days 20 tablet 0    predniSONE (DELTASONE) 10 MG tablet Take 1 tablet by mouth 2 times daily for 5 days 10 tablet 0    ondansetron (ZOFRAN-ODT) 4 MG disintegrating tablet Take 1 tablet by mouth 2 times daily as needed for Nausea or Vomiting 10 tablet 0    lisinopril-hydroCHLOROthiazide (PRINZIDE;ZESTORETIC) 20-12.5 MG per tablet take 1 tablet by mouth once daily 90 tablet 1    omeprazole (PRILOSEC) 40 MG delayed release capsule take 1 capsule by mouth every morning before breakfast (Patient taking differently: Indications: gets OTC ) 30 capsule 5     No current facility-administered medications for this visit. Allergies   Allergen Reactions    Allopurinol Other (See Comments)     \"made gout worse\"    Morphine Other (See Comments)     Does not like how it makes her feel       :     Review of Systems   Constitutional: Negative for chills and fever. HENT: Positive for congestion, postnasal drip, rhinorrhea and sinus pressure. Negative for ear pain and sore throat. Eyes: Negative for redness and itching. Respiratory: Negative for cough. Neurological: Positive for headaches.       :     /88 (Site: Left Upper Arm, Position: Sitting, Cuff Size: Medium Adult)   Pulse 104   Temp 96.6 °F (35.9 °C) (Infrared)   SpO2 97%     Physical Exam  Vitals reviewed. Constitutional:       General: She is not in acute distress. Appearance: Normal appearance. HENT:      Head: Normocephalic and atraumatic. Right Ear: Tympanic membrane, ear canal and external ear normal.      Left Ear: Tympanic membrane, ear canal and external ear normal.      Nose: No rhinorrhea. Right Sinus: Frontal sinus tenderness present. No maxillary sinus tenderness. Left Sinus: Frontal sinus tenderness present. No maxillary sinus tenderness. Mouth/Throat:      Mouth: Mucous membranes are moist.      Pharynx: Oropharynx is clear. Posterior oropharyngeal erythema present. Eyes:      Conjunctiva/sclera: Conjunctivae normal.   Cardiovascular:      Rate and Rhythm: Normal rate and regular rhythm. Heart sounds: Normal heart sounds. Pulmonary:      Effort: Pulmonary effort is normal.      Breath sounds: Normal breath sounds. Musculoskeletal:      Cervical back: Neck supple. Lymphadenopathy:      Cervical: No cervical adenopathy. Skin:     General: Skin is warm and dry. Neurological:      Mental Status: She is alert and oriented to person, place, and time.    Psychiatric:         Attention and Perception: Attention normal.         Mood and Affect: Mood normal.         Speech: Speech normal. Behavior: Behavior normal. Behavior is cooperative.           :          1. Subacute frontal sinusitis  -     amoxicillin-clavulanate (AUGMENTIN) 875-125 MG per tablet; Take 1 tablet by mouth 2 times daily for 10 days, Disp-20 tablet, R-0Normal  -     predniSONE (DELTASONE) 10 MG tablet; Take 1 tablet by mouth 2 times daily for 5 days, Disp-10 tablet, R-0Normal       :      Return if symptoms worsen or fail to improve. Orders Placed This Encounter   Medications    amoxicillin-clavulanate (AUGMENTIN) 875-125 MG per tablet     Sig: Take 1 tablet by mouth 2 times daily for 10 days     Dispense:  20 tablet     Refill:  0    predniSONE (DELTASONE) 10 MG tablet     Sig: Take 1 tablet by mouth 2 times daily for 5 days     Dispense:  10 tablet     Refill:  0      Instructed to finish the entire course of antibiotic treatment. Complete short course of oral steroid treatment. Increase fluid intake. Recommend daily use of an antihistamine and nasal steroid spray. Follow-up with ENT referral already placed. Patient and/or parent given educational materials - see patient instructions. Discussed use, benefit, and side effects of prescribed medications. All patient questions answered. Patient and/or parent voiced understanding.       Electronically signed by ZA Fields 11/5/2021 at 3:21 PM

## 2021-12-09 ENCOUNTER — HOSPITAL ENCOUNTER (OUTPATIENT)
Dept: GENERAL RADIOLOGY | Facility: CLINIC | Age: 71
Discharge: HOME | End: 2021-12-11
Payer: MEDICARE

## 2021-12-09 ENCOUNTER — HOSPITAL ENCOUNTER (OUTPATIENT)
Facility: CLINIC | Age: 71
Discharge: HOME | End: 2021-12-11
Payer: MEDICARE

## 2021-12-09 DIAGNOSIS — M48.00 SPINAL STENOSIS, UNSPECIFIED SPINAL REGION: ICD-10-CM

## 2021-12-09 DIAGNOSIS — M54.2 CERVICALGIA: ICD-10-CM

## 2021-12-09 PROCEDURE — 72040 X-RAY EXAM NECK SPINE 2-3 VW: CPT

## 2021-12-09 PROCEDURE — 72110 X-RAY EXAM L-2 SPINE 4/>VWS: CPT

## 2021-12-15 ENCOUNTER — HOSPITAL ENCOUNTER (OUTPATIENT)
Dept: MRI IMAGING | Facility: CLINIC | Age: 71
Discharge: HOME | End: 2021-12-17
Payer: MEDICARE

## 2021-12-15 DIAGNOSIS — M54.2 CERVICALGIA: ICD-10-CM

## 2021-12-15 DIAGNOSIS — M48.00 SPINAL STENOSIS, UNSPECIFIED SPINAL REGION: ICD-10-CM

## 2021-12-15 PROCEDURE — 72141 MRI NECK SPINE W/O DYE: CPT

## 2021-12-29 ENCOUNTER — OFFICE VISIT (OUTPATIENT)
Dept: FAMILY MEDICINE CLINIC | Age: 71
End: 2021-12-29
Payer: MEDICARE

## 2021-12-29 VITALS
OXYGEN SATURATION: 96 % | HEART RATE: 84 BPM | BODY MASS INDEX: 37.82 KG/M2 | DIASTOLIC BLOOD PRESSURE: 84 MMHG | HEIGHT: 65 IN | WEIGHT: 227 LBS | SYSTOLIC BLOOD PRESSURE: 120 MMHG

## 2021-12-29 DIAGNOSIS — E78.1 HIGH TRIGLYCERIDES: ICD-10-CM

## 2021-12-29 DIAGNOSIS — E66.01 SEVERE OBESITY (BMI 35.0-35.9 WITH COMORBIDITY) (HCC): ICD-10-CM

## 2021-12-29 DIAGNOSIS — Z00.00 ROUTINE GENERAL MEDICAL EXAMINATION AT A HEALTH CARE FACILITY: Primary | ICD-10-CM

## 2021-12-29 DIAGNOSIS — I10 ESSENTIAL HYPERTENSION: ICD-10-CM

## 2021-12-29 DIAGNOSIS — R73.9 HYPERGLYCEMIA: ICD-10-CM

## 2021-12-29 PROCEDURE — G0439 PPPS, SUBSEQ VISIT: HCPCS | Performed by: NURSE PRACTITIONER

## 2021-12-29 PROCEDURE — G8484 FLU IMMUNIZE NO ADMIN: HCPCS | Performed by: NURSE PRACTITIONER

## 2021-12-29 PROCEDURE — 3017F COLORECTAL CA SCREEN DOC REV: CPT | Performed by: NURSE PRACTITIONER

## 2021-12-29 PROCEDURE — 4040F PNEUMOC VAC/ADMIN/RCVD: CPT | Performed by: NURSE PRACTITIONER

## 2021-12-29 PROCEDURE — 1123F ACP DISCUSS/DSCN MKR DOCD: CPT | Performed by: NURSE PRACTITIONER

## 2021-12-29 ASSESSMENT — LIFESTYLE VARIABLES
HOW MANY STANDARD DRINKS CONTAINING ALCOHOL DO YOU HAVE ON A TYPICAL DAY: 0
AUDIT-C TOTAL SCORE: 2
HOW OFTEN DO YOU HAVE SIX OR MORE DRINKS ON ONE OCCASION: 0
HOW OFTEN DO YOU HAVE A DRINK CONTAINING ALCOHOL: 2

## 2021-12-29 ASSESSMENT — PATIENT HEALTH QUESTIONNAIRE - PHQ9
2. FEELING DOWN, DEPRESSED OR HOPELESS: 0
SUM OF ALL RESPONSES TO PHQ QUESTIONS 1-9: 0
SUM OF ALL RESPONSES TO PHQ9 QUESTIONS 1 & 2: 0
1. LITTLE INTEREST OR PLEASURE IN DOING THINGS: 0
SUM OF ALL RESPONSES TO PHQ QUESTIONS 1-9: 0
SUM OF ALL RESPONSES TO PHQ QUESTIONS 1-9: 0

## 2021-12-29 NOTE — PATIENT INSTRUCTIONS
Personalized Preventive Plan for Natty Coffman - 12/29/2021  Medicare offers a range of preventive health benefits. Some of the tests and screenings are paid in full while other may be subject to a deductible, co-insurance, and/or copay. Some of these benefits include a comprehensive review of your medical history including lifestyle, illnesses that may run in your family, and various assessments and screenings as appropriate. After reviewing your medical record and screening and assessments performed today your provider may have ordered immunizations, labs, imaging, and/or referrals for you. A list of these orders (if applicable) as well as your Preventive Care list are included within your After Visit Summary for your review. Other Preventive Recommendations:    · A preventive eye exam performed by an eye specialist is recommended every 1-2 years to screen for glaucoma; cataracts, macular degeneration, and other eye disorders. · A preventive dental visit is recommended every 6 months. · Try to get at least 150 minutes of exercise per week or 10,000 steps per day on a pedometer . · Order or download the FREE \"Exercise & Physical Activity: Your Everyday Guide\" from [employer]. Call 5-185.457.5164 or search [employer] online. · You need 1081-2062 mg of calcium and 3190-4800 IU of vitamin D per day. It is possible to meet your calcium requirement with diet alone, but a vitamin D supplement is usually necessary to meet this goal.  · When exposed to the sun, use a sunscreen that protects against both UVA and UVB radiation with an SPF of 30 or greater. Reapply every 2 to 3 hours or after sweating, drying off with a towel, or swimming. · Always wear a seat belt when traveling in a car. Always wear a helmet when riding a bicycle or motorcycle.

## 2021-12-29 NOTE — PROGRESS NOTES
UPPER GASTROINTESTINAL ENDOSCOPY  9/15/2021    EGD BIOPSY performed by Oscar Ocasio MD at [facility]    UPPER GASTROINTESTINAL ENDOSCOPY  9/15/2021    EGD W/EUS FNA performed by Oscar Ocasio MD at [facility]       Family History   Problem Relation Age of Onset    Heart Disease Mother     Diabetes Father     Diabetes Brother        CareTeam (Including outside providers/suppliers regularly involved in providing care):   Patient Care Team:  ZA Whitehead CNP as PCP - General (Certified Nurse Practitioner)  ZA Whitehead [facility]  ZA Whitehead CNP as Referring Physician (Certified Nurse Practitioner)    Wt Readings from Last 3 Encounters:   12/29/21 227 lb (103 kg)   09/27/21 226 lb (102.5 kg)   09/22/21 226 lb (102.5 kg)     Vitals:    12/29/21 0729   BP: 120/84   Site: Left Upper Arm   Position: Sitting   Cuff Size: Large Adult   Pulse: 84   SpO2: 96%   Weight: 227 lb (103 kg)   Height: 5' 5\" (1.651 m)     Body mass index is 37.77 kg/m². Based upon direct observation of the patient, evaluation of cognition reveals recent and remote memory intact. Patient's complete Health Risk Assessment and screening values have been reviewed and are found in Flowsheets. The following problems were reviewed today and where indicated follow up appointments were made and/or referrals ordered. Positive Risk Factor Screenings with Interventions:            General Health and ACP:  General  In general, how would you say your health is?: Good  In the past 7 days, have you experienced any of the following?  New or Increased Pain, New or Increased Fatigue, Loneliness, Social Isolation, Stress or Anger?: None of These  Do you get the social and emotional support that you need?: Yes  Do you have a Living Will?: Yes  Advance Directives     Power of  Living Will ACP-Advance Directive ACP-Power of     Not on File Not on File Not on File Not on File      General Health Risk Interventions:  · na    Health Habits/Nutrition:  Health Habits/Nutrition  Do you exercise for at least 20 minutes 2-3 times per week?: Yes  Have you lost any weight without trying in the past 3 months?: No  Do you eat only one meal per day?: No  Have you seen the dentist within the past year?: (!) No  Body mass index: (!) 37.77  Health Habits/Nutrition Interventions:  · Inadequate physical activity:  working with pain management so she can start getting more exercise  · Dental exam overdue:  patient encouraged to make appointment with his/her dentist     Safety:  Safety  Do you have working smoke detectors?: (!) No  Have all throw rugs been removed or fastened?: (!) No  Do you have non-slip mats or surfaces in all bathtubs/showers?: Yes  Do all of your stairways have a railing or banister?: Yes  Are your doorways, halls and stairs free of clutter?: Yes  Do you always fasten your seatbelt when you are in a car?: Yes  Safety Interventions:  · Home safety tips provided       Personalized Preventive Plan   Current Health Maintenance Status  Immunization History   Administered Date(s) Administered    COVID-19, Pfizer, PF, 30mcg/0.3mL 02/17/2021, 03/10/2021, 10/13/2021    Influenza Vaccine, unspecified formulation 10/15/2016    Influenza Virus Vaccine 10/15/2016, 12/05/2016    Influenza, High Dose (Fluzone 65 yrs and older) 09/26/2018    Influenza, Quadv, IM, (6 mo and older Fluzone, Flulaval, Fluarix and 3 yrs and older Afluria) 08/21/2017    Influenza, Quadv, IM, PF (6 mo and older Fluzone, Flulaval, Fluarix, and 3 yrs and older Afluria) 08/20/2020    Influenza, Quadv, adjuvanted, 65 yrs +, IM, PF (Fluad) 09/22/2021    Influenza, Triv, inactivated, subunit, adjuvanted, IM (Fluad 65 yrs and older) 09/09/2019    Pneumococcal Conjugate 13-valent ([id number]) 08/21/2017    Pneumococcal Polysaccharide ([id number]) 09/26/2018    Tdap (Boostrix, Adacel) 08/21/2017    Zoster Recombinant (Shingrix) 02/28/2020, 08/20/2020        Health Maintenance   Topic Date Due    Annual Wellness Visit (AWV)  10/14/2020    A1C test (Diabetic or Prediabetic)  10/14/2021    Potassium monitoring  10/31/2021    Creatinine monitoring  10/31/2021    Breast cancer screen  07/13/2023    Lipid screen  10/14/2025    DTaP/Tdap/Td vaccine (2 - Td or Tdap) 08/21/2027    Colon cancer screen colonoscopy  02/28/2029    DEXA (modify frequency per FRAX score)  Completed    Flu vaccine  Completed    Shingles Vaccine  Completed    Pneumococcal 65+ years Vaccine  Completed    COVID-19 Vaccine  Completed    Hepatitis C screen  Completed    Hepatitis A vaccine  Aged Out    Hepatitis B vaccine  Aged Out    Hib vaccine  Aged Out    Meningococcal (ACWY) vaccine  Aged Out     Recommendations for Sensinode Due: see orders and patient instructions/AVS.  . Recommended screening schedule for the next 5-10 years is provided to the patient in written form: see Patient Vidal Brochure was seen today for medicare awv.     Diagnoses and all orders for this visit:    Essential hypertension    Hyperglycemia    High triglycerides

## 2022-01-06 ENCOUNTER — HOSPITAL ENCOUNTER (OUTPATIENT)
Dept: CT IMAGING | Facility: CLINIC | Age: 72
Discharge: HOME | End: 2022-01-08
Payer: MEDICARE

## 2022-01-06 ENCOUNTER — HOSPITAL ENCOUNTER (OUTPATIENT)
Dept: CT IMAGING | Age: 72
Discharge: HOME | End: 2022-01-08
Payer: MEDICARE

## 2022-01-06 DIAGNOSIS — R42 DIZZINESS AND GIDDINESS: ICD-10-CM

## 2022-01-06 DIAGNOSIS — G50.1 ATYPICAL FACE PAIN: ICD-10-CM

## 2022-01-06 DIAGNOSIS — J32.9 CHRONIC SINUSITIS, UNSPECIFIED LOCATION: ICD-10-CM

## 2022-01-06 DIAGNOSIS — R09.81 NASAL CONGESTION: ICD-10-CM

## 2022-01-06 PROCEDURE — 70486 CT MAXILLOFACIAL W/O DYE: CPT

## 2022-03-08 DIAGNOSIS — I10 ESSENTIAL HYPERTENSION: ICD-10-CM

## 2022-03-08 RX ORDER — LISINOPRIL AND HYDROCHLOROTHIAZIDE 20; 12.5 MG/1; MG/1
TABLET ORAL
Qty: 90 TABLET | Refills: 1 | Status: SHIPPED | OUTPATIENT
Start: 2022-03-08

## 2022-03-08 NOTE — TELEPHONE ENCOUNTER
Last visit: 12/29/21  Last Med refill: 9/13/21  Does patient have enough medication for 72 hours: Yes    Next Visit Date:  Future Appointments   Date Time Provider Jeovanny Olivo   1/4/2023  8:30 AM ZA Finley CNP [facility] [address] Maintenance   Topic Date Due    A1C test (Diabetic or Prediabetic)  10/14/2021    Potassium monitoring  10/31/2021    Creatinine monitoring  10/31/2021    Depression Screen  12/29/2022    Annual Wellness Visit (AWV)  12/30/2022    Breast cancer screen  07/13/2023    Lipid screen  10/14/2025    DTaP/Tdap/Td vaccine (2 - Td or Tdap) 08/21/2027    Colorectal Cancer Screen  02/28/2029    DEXA (modify frequency per FRAX score)  Completed    Flu vaccine  Completed    Shingles Vaccine  Completed    Pneumococcal 65+ years Vaccine  Completed    COVID-19 Vaccine  Completed    Hepatitis C screen  Completed    Hepatitis A vaccine  Aged Out    Hepatitis B vaccine  Aged Out    Hib vaccine  Aged Out    Meningococcal (ACWY) vaccine  Aged Out       Hemoglobin A1C (%)   Date Value   10/14/2020 5.9   02/11/2019 5.9   08/31/2017 5.7             ( goal A1C is < 7)   No results found for: LABMICR  LDL Cholesterol (mg/dL)   Date Value   10/14/2020 62   02/22/2019 56       (goal LDL is <100)   AST (U/L)   Date Value   07/26/2021 31     ALT (U/L)   Date Value   07/26/2021 34 (H)     BUN (mg/dL)   Date Value   10/31/2020 14     BP Readings from Last 3 Encounters:   12/29/21 120/84   11/05/21 136/88   09/27/21 131/81          (goal 120/80)    All Future Testing planned in [facility]  Lab Frequency Next Occurrence   CBC Once 06/29/2022   Comprehensive Metabolic Panel Once 45/98/2366   Lipid, Fasting Once 06/29/2022   Hemoglobin A1C Once 06/29/2022               Patient Active Problem List:     Essential hypertension     Osteopenia     Status post total replacement of left hip     Obesity (BMI 30-39. 9)     Hyperglycemia     High triglycerides

## 2022-04-14 ENCOUNTER — HOSPITAL ENCOUNTER (OUTPATIENT)
Age: 72
Setting detail: SPECIMEN
Discharge: HOME | End: 2022-04-14

## 2022-04-14 DIAGNOSIS — I10 ESSENTIAL HYPERTENSION: ICD-10-CM

## 2022-04-14 DIAGNOSIS — R73.9 HYPERGLYCEMIA: ICD-10-CM

## 2022-04-14 DIAGNOSIS — E78.1 HIGH TRIGLYCERIDES: ICD-10-CM

## 2022-04-14 LAB
ALBUMIN SERPL-MCNC: 4.8 G/DL (ref 3.5–5.2)
ALBUMIN/GLOBULIN RATIO: 1.7 (ref 1–2.5)
ALP BLD-CCNC: 93 U/L (ref 35–104)
ALT SERPL-CCNC: 38 U/L (ref 5–33)
ANION GAP SERPL CALCULATED.3IONS-SCNC: 15 MMOL/L (ref 9–17)
AST SERPL-CCNC: 31 U/L
BILIRUB SERPL-MCNC: 0.43 MG/DL (ref 0.3–1.2)
BUN BLDV-MCNC: 15 MG/DL (ref 8–23)
CALCIUM SERPL-MCNC: 10.8 MG/DL (ref 8.6–10.4)
CHLORIDE BLD-SCNC: 101 MMOL/L (ref 98–107)
CHOLESTEROL, FASTING: 225 MG/DL
CHOLESTEROL/HDL RATIO: 3.3
CO2: 25 MMOL/L (ref 20–31)
CREAT SERPL-MCNC: 0.8 MG/DL (ref 0.5–0.9)
ESTIMATED AVERAGE GLUCOSE: 126 MG/DL
GFR AFRICAN AMERICAN: >60 ML/MIN
GFR NON-AFRICAN AMERICAN: >60 ML/MIN
GFR SERPL CREATININE-BSD FRML MDRD: ABNORMAL ML/MIN/{1.73_M2}
GLUCOSE BLD-MCNC: 87 MG/DL (ref 70–99)
HBA1C MFR BLD: 6 % (ref 4–6)
HCT VFR BLD CALC: 47.9 % (ref 36.3–47.1)
HDLC SERPL-MCNC: 69 MG/DL
HEMOGLOBIN: 15.1 G/DL (ref 11.9–15.1)
LDL CHOLESTEROL: 109 MG/DL (ref 0–130)
MCH RBC QN AUTO: 28.7 PG (ref 25.2–33.5)
MCHC RBC AUTO-ENTMCNC: 31.5 G/DL (ref 28.4–34.8)
MCV RBC AUTO: 91.1 FL (ref 82.6–102.9)
NRBC AUTOMATED: 0 PER 100 WBC
PDW BLD-RTO: 15 % (ref 11.8–14.4)
PLATELET # BLD: 248 K/UL (ref 138–453)
PMV BLD AUTO: 11.4 FL (ref 8.1–13.5)
POTASSIUM SERPL-SCNC: 4.6 MMOL/L (ref 3.7–5.3)
RBC # BLD: 5.26 M/UL (ref 3.95–5.11)
SODIUM BLD-SCNC: 141 MMOL/L (ref 135–144)
TOTAL PROTEIN: 7.7 G/DL (ref 6.4–8.3)
TRIGLYCERIDE, FASTING: 233 MG/DL
WBC # BLD: 6.8 K/UL (ref 3.5–11.3)

## 2022-07-11 ENCOUNTER — OFFICE VISIT (OUTPATIENT)
Dept: UROLOGY | Age: 72
End: 2022-07-11
Payer: MEDICARE

## 2022-07-11 ENCOUNTER — HOSPITAL ENCOUNTER (OUTPATIENT)
Dept: CT IMAGING | Facility: CLINIC | Age: 72
Discharge: HOME | End: 2022-07-13
Payer: MEDICARE

## 2022-07-11 VITALS
HEART RATE: 82 BPM | BODY MASS INDEX: 34.99 KG/M2 | HEIGHT: 65 IN | SYSTOLIC BLOOD PRESSURE: 128 MMHG | WEIGHT: 210 LBS | TEMPERATURE: 96.8 F | DIASTOLIC BLOOD PRESSURE: 80 MMHG | OXYGEN SATURATION: 97 %

## 2022-07-11 DIAGNOSIS — N20.0 KIDNEY STONE: ICD-10-CM

## 2022-07-11 DIAGNOSIS — N20.0 KIDNEY STONE: Primary | ICD-10-CM

## 2022-07-11 DIAGNOSIS — R10.9 FLANK PAIN: ICD-10-CM

## 2022-07-11 PROCEDURE — 1123F ACP DISCUSS/DSCN MKR DOCD: CPT | Performed by: UROLOGY

## 2022-07-11 PROCEDURE — 1090F PRES/ABSN URINE INCON ASSESS: CPT | Performed by: UROLOGY

## 2022-07-11 PROCEDURE — G8399 PT W/DXA RESULTS DOCUMENT: HCPCS | Performed by: UROLOGY

## 2022-07-11 PROCEDURE — G8417 CALC BMI ABV UP PARAM F/U: HCPCS | Performed by: UROLOGY

## 2022-07-11 PROCEDURE — 74176 CT ABD & PELVIS W/O CONTRAST: CPT

## 2022-07-11 PROCEDURE — 99214 OFFICE O/P EST MOD 30 MIN: CPT | Performed by: UROLOGY

## 2022-07-11 PROCEDURE — G8427 DOCREV CUR MEDS BY ELIG CLIN: HCPCS | Performed by: UROLOGY

## 2022-07-11 PROCEDURE — 1036F TOBACCO NON-USER: CPT | Performed by: UROLOGY

## 2022-07-11 PROCEDURE — 3017F COLORECTAL CA SCREEN DOC REV: CPT | Performed by: UROLOGY

## 2022-07-11 ASSESSMENT — ENCOUNTER SYMPTOMS
SHORTNESS OF BREATH: 0
COUGH: 0
VOMITING: 0
WHEEZING: 0
NAUSEA: 0
DIARRHEA: 0
SORE THROAT: 0

## 2022-07-11 NOTE — PROGRESS NOTES
[address] [address]  Dept:  Yana Burkett [facility] Urology Office Note - Established    Patient:  Pako Monroy  YOB: 1950  Date: 7/11/2022    The patient is a 81 y.o. female whopresents today for evaluation of the following problems:   Chief Complaint   Patient presents with    1 Year Follow Up     Kidney stone     Flank Pain     x1 day        HPI  This is a very pleasant 21-year-old female has a history of stones. She has had intensifying left flank pain over the last few days. Sometimes it is quite significant. She has not had any lower urinary tract symptoms since this episode started. Summary of old records: N/A    Additional History: N/A    Procedures Today: N/A    Urinalysis today:  No results found for this visit on 07/11/22. Imaging Reviewed during this Office Visit: none  (results were independently reviewed by physician and radiology report verified)    AUA Symptom Score (7/11/2022): Last BUN and creatinine:  Lab Results   Component Value Date    BUN 15 04/14/2022     Lab Results   Component Value Date    CREATININE 0.80 04/14/2022       Additional Lab/Culture results: none    PAST MEDICAL, FAMILY AND SOCIAL HISTORY UPDATE:  Past Medical History:   Diagnosis Date    Arthritis     osteoarthritis    Gastric nodule     patient reports    Hypertension      Past Surgical History:   Procedure Laterality Date    CHOLECYSTECTOMY, LAPAROSCOPIC  2012    COLONOSCOPY      DILATION AND CURETTAGE OF UTERUS  1982    [place] TOTAL HIP ARTHROPLASTY Left 11/14/2017    HIP TOTAL ARTHROPLASTY ANTERIOR APPROACH (MEDACTA, FASCIA ILIACA BLOCK VS. LUMBAR PLEXUS PRE-OP, SPINAL VS. GENERAL, MEDACTA TABLE, C-ARM) performed by Melissa Reddy DO at [address] [place] [address] [place] Bilateral     as child (lazy eye?? )    TUBAL LIGATION      UPPER GASTROINTESTINAL ENDOSCOPY      UPPER GASTROINTESTINAL ENDOSCOPY  9/15/2021    EGD BIOPSY performed by Katerina Degroot MD at [facility]    UPPER GASTROINTESTINAL ENDOSCOPY  9/15/2021    EGD W/EUS FNA performed by Katerina Degroot MD at [facility]     Family History   Problem Relation Age of Onset    Heart Disease Mother     Diabetes Father     Diabetes Brother      Outpatient Medications Marked as Taking for the 7/11/22 encounter (Office Visit) with Clara Sanchez MD   Medication Sig Dispense Refill    lisinopril-hydroCHLOROthiazide (PRINZIDE;ZESTORETIC) 20-12.5 MG per tablet take 1 tablet by mouth once daily 90 tablet 1    ondansetron (ZOFRAN-ODT) 4 MG disintegrating tablet Take 1 tablet by mouth 2 times daily as needed for Nausea or Vomiting 10 tablet 0    omeprazole (PRILOSEC) 40 MG delayed release capsule take 1 capsule by mouth every morning before breakfast (Patient taking differently: Indications: gets OTC ) 30 capsule 5      (All medications reviewed and updated by provider sincelast office visit or hospitalization)   Allopurinol and Morphine  Social History     Tobacco Use   Smoking Status Never Smoker   Smokeless Tobacco Never Used      (If patient a smoker, smoking cessation counseling offered)     Social History     Substance and Sexual Activity   Alcohol Use Yes    Comment: 1 glass per month       REVIEW OF SYSTEMS:  Review of Systems      Physical Exam:      Vitals:    07/11/22 1010   BP: 128/80   Pulse: 82   Temp: 96.8 °F (36 °C)   SpO2: 97%     Body mass index is 34.95 kg/m². Patient is a 81 y.o. female in noacute distress and alert and oriented to person, place and time. Physical Exam  Constitutional: Patient in no acute distress. Neuro: Alert andoriented to person, place and time. Psych: Mood normal, affect normal        and Plan      1. Kidney stone    2. Flank pain           Plan:   Stat ct stone protocol       Return for stat ct stone protocol.     Prescriptions Ordered:  No orders of the defined types were placed in this encounter. Orders Placed:  Orders Placed This Encounter   Procedures    CT ABDOMEN PELVIS WO CONTRAST Additional Contrast? None     Standing Status:   Future     Standing Expiration Date:   7/11/2023     Order Specific Question:   Additional Contrast?     Answer:   None            Roxana Arzola MD    Agree with the ROS entered by the MA.

## 2022-07-11 NOTE — PROGRESS NOTES
Review of Systems   Constitutional: Negative for chills, fatigue and fever. HENT: Negative for congestion and sore throat. Respiratory: Negative for cough, shortness of breath and wheezing. Cardiovascular: Negative for chest pain and palpitations. Gastrointestinal: Negative for diarrhea, nausea and vomiting. Genitourinary: Positive for flank pain. Negative for difficulty urinating, dysuria, frequency, hematuria and urgency.

## 2022-12-12 DIAGNOSIS — I10 ESSENTIAL HYPERTENSION: ICD-10-CM

## 2022-12-14 RX ORDER — LISINOPRIL AND HYDROCHLOROTHIAZIDE 20; 12.5 MG/1; MG/1
TABLET ORAL
Qty: 30 TABLET | Refills: 1 | Status: SHIPPED | OUTPATIENT
Start: 2022-12-14 | End: 2023-01-24 | Stop reason: SDUPTHER

## 2022-12-14 NOTE — TELEPHONE ENCOUNTER
LAST VISIT:   12/29/2021     Future Appointments   Date Time Provider Jeovanny Olivo   1/4/2023  8:30 AM ZA Alaniz - CNP [facility]   1/9/2023  8:00 AM Italia Colunga MD [address] [place] [address]

## 2022-12-19 DIAGNOSIS — I10 ESSENTIAL HYPERTENSION: ICD-10-CM

## 2022-12-19 RX ORDER — LISINOPRIL AND HYDROCHLOROTHIAZIDE 20; 12.5 MG/1; MG/1
TABLET ORAL
Qty: 90 TABLET | Refills: 1 | OUTPATIENT
Start: 2022-12-19

## 2022-12-19 NOTE — TELEPHONE ENCOUNTER
Last visit: 12/29/2021  Last Med refill: LAST WRITTEN 12/14/2022  INSURANCE WANTS FOR 90 DAY SUPPLY  Does patient have enough medication for 72 hours: No:     Next Visit Date:  Future Appointments   Date Time Provider Jeovanny Olivo   1/4/2023  8:30 AM ZA Akers - CNP [facility]   1/9/2023  8:00 AM Mariella Wright MD [address] Maintenance   Topic Date Due    Depression Screen  12/29/2022    Annual Wellness Visit (AWV)  12/30/2022    A1C test (Diabetic or Prediabetic)  04/14/2023    Breast cancer screen  07/13/2023    Lipids  04/14/2027    DTaP/Tdap/Td vaccine (2 - Td or Tdap) 08/21/2027    Colorectal Cancer Screen  02/28/2029    DEXA (modify frequency per FRAX score)  Completed    Flu vaccine  Completed    Shingles vaccine  Completed    Pneumococcal 65+ years Vaccine  Completed    COVID-19 Vaccine  Completed    Hepatitis C screen  Completed    Hepatitis A vaccine  Aged Out    Hib vaccine  Aged Out    Meningococcal (ACWY) vaccine  Aged Out       Hemoglobin A1C (%)   Date Value   04/14/2022 6.0   10/14/2020 5.9   02/11/2019 5.9             ( goal A1C is < 7)   No results found for: LABMICR  LDL Cholesterol (mg/dL)   Date Value   04/14/2022 109   10/14/2020 62       (goal LDL is <100)   AST (U/L)   Date Value   04/14/2022 31     ALT (U/L)   Date Value   04/14/2022 38 (H)     BUN (mg/dL)   Date Value   04/14/2022 15     BP Readings from Last 3 Encounters:   07/11/22 128/80   12/29/21 120/84   11/05/21 136/88          (goal 120/80)    All Future Testing planned in [facility]  Lab Frequency Next Occurrence               Patient Active Problem List:     Essential hypertension     Osteopenia     Status post total replacement of left hip     Obesity (BMI 30-39. 9)     Hyperglycemia     High triglycerides

## 2022-12-29 DIAGNOSIS — N20.0 KIDNEY STONES: Primary | ICD-10-CM

## 2023-01-20 ENCOUNTER — HOSPITAL ENCOUNTER (OUTPATIENT)
Dept: MRI IMAGING | Facility: CLINIC | Age: 73
End: 2023-01-20
Payer: MEDICARE

## 2023-01-20 ENCOUNTER — HOSPITAL ENCOUNTER (OUTPATIENT)
Facility: CLINIC | Age: 73
End: 2023-01-20
Payer: MEDICARE

## 2023-01-20 ENCOUNTER — HOSPITAL ENCOUNTER (OUTPATIENT)
Dept: GENERAL RADIOLOGY | Facility: CLINIC | Age: 73
End: 2023-01-20
Payer: MEDICARE

## 2023-01-20 DIAGNOSIS — M47.817 LUMBOSACRAL SPONDYLOSIS WITHOUT MYELOPATHY: ICD-10-CM

## 2023-01-20 DIAGNOSIS — N20.0 KIDNEY STONES: ICD-10-CM

## 2023-01-20 DIAGNOSIS — M48.062 PSEUDOCLAUDICATION SYNDROME: ICD-10-CM

## 2023-01-20 PROCEDURE — 72148 MRI LUMBAR SPINE W/O DYE: CPT

## 2023-01-20 PROCEDURE — 74018 RADEX ABDOMEN 1 VIEW: CPT

## 2023-01-24 ENCOUNTER — OFFICE VISIT (OUTPATIENT)
Dept: FAMILY MEDICINE CLINIC | Age: 73
End: 2023-01-24

## 2023-01-24 VITALS
SYSTOLIC BLOOD PRESSURE: 124 MMHG | BODY MASS INDEX: 35.16 KG/M2 | HEIGHT: 65 IN | OXYGEN SATURATION: 98 % | WEIGHT: 211 LBS | HEART RATE: 79 BPM | DIASTOLIC BLOOD PRESSURE: 80 MMHG

## 2023-01-24 DIAGNOSIS — Z96.642 STATUS POST TOTAL REPLACEMENT OF LEFT HIP: ICD-10-CM

## 2023-01-24 DIAGNOSIS — I10 ESSENTIAL HYPERTENSION: ICD-10-CM

## 2023-01-24 DIAGNOSIS — Z12.31 ENCOUNTER FOR SCREENING MAMMOGRAM FOR BREAST CANCER: ICD-10-CM

## 2023-01-24 DIAGNOSIS — Z00.00 MEDICARE ANNUAL WELLNESS VISIT, SUBSEQUENT: Primary | ICD-10-CM

## 2023-01-24 DIAGNOSIS — E66.01 SEVERE OBESITY (BMI 35.0-39.9) WITH COMORBIDITY (HCC): ICD-10-CM

## 2023-01-24 DIAGNOSIS — R09.89 VEIN SYMPTOM: ICD-10-CM

## 2023-01-24 RX ORDER — HYDROCODONE BITARTRATE AND ACETAMINOPHEN 5; 325 MG/1; MG/1
TABLET ORAL
COMMUNITY
Start: 2022-12-27

## 2023-01-24 RX ORDER — LISINOPRIL AND HYDROCHLOROTHIAZIDE 20; 12.5 MG/1; MG/1
TABLET ORAL
Qty: 90 TABLET | Refills: 1 | Status: SHIPPED | OUTPATIENT
Start: 2023-01-24

## 2023-01-24 SDOH — ECONOMIC STABILITY: FOOD INSECURITY: WITHIN THE PAST 12 MONTHS, YOU WORRIED THAT YOUR FOOD WOULD RUN OUT BEFORE YOU GOT MONEY TO BUY MORE.: NEVER TRUE

## 2023-01-24 SDOH — ECONOMIC STABILITY: FOOD INSECURITY: WITHIN THE PAST 12 MONTHS, THE FOOD YOU BOUGHT JUST DIDN'T LAST AND YOU DIDN'T HAVE MONEY TO GET MORE.: NEVER TRUE

## 2023-01-24 ASSESSMENT — SOCIAL DETERMINANTS OF HEALTH (SDOH): HOW HARD IS IT FOR YOU TO PAY FOR THE VERY BASICS LIKE FOOD, HOUSING, MEDICAL CARE, AND HEATING?: NOT HARD AT ALL

## 2023-01-24 ASSESSMENT — LIFESTYLE VARIABLES
HOW OFTEN DO YOU HAVE A DRINK CONTAINING ALCOHOL: MONTHLY OR LESS
HOW MANY STANDARD DRINKS CONTAINING ALCOHOL DO YOU HAVE ON A TYPICAL DAY: 1 OR 2

## 2023-01-24 ASSESSMENT — PATIENT HEALTH QUESTIONNAIRE - PHQ9
SUM OF ALL RESPONSES TO PHQ QUESTIONS 1-9: 0
1. LITTLE INTEREST OR PLEASURE IN DOING THINGS: 0
SUM OF ALL RESPONSES TO PHQ9 QUESTIONS 1 & 2: 0
2. FEELING DOWN, DEPRESSED OR HOPELESS: 0
SUM OF ALL RESPONSES TO PHQ QUESTIONS 1-9: 0

## 2023-01-24 NOTE — PROGRESS NOTES
Medicare Annual Wellness Visit    Ash Redman is here for Medicare AWV    Assessment & Plan   Medicare annual wellness visit, subsequent  Essential hypertension  The following orders have not been finalized:  -     lisinopril-hydroCHLOROthiazide (PRINZIDE;ZESTORETIC) 20-12.5 MG per tablet  Encounter for screening mammogram for breast cancer  Severe obesity (BMI 35.0-39. 9) with comorbidity ([address]  Vein symptom  Status post total replacement of left hip  The following orders have not been finalized:  -     Handicap [place]ac[place] [facility]    Recommendations for Preventive Services Due: see orders and patient instructions/AVS.  Recommended screening schedule for the next 5-10 years is provided to the patient in written form: see Patient Instructions/AVS.   Would like a referral to vascular- has an area on left anterior lower leg- states hit this area very hard when she was a teenager, never bothered her until about 6 months ago started aching at times. Some bluish discoloration/spider veins. No signs of clot. Return for Medicare Annual Wellness Visit in 1 year. Subjective       Patient's complete Health Risk Assessment and screening values have been reviewed and are found in Flowsheets. The following problems were reviewed today and where indicated follow up appointments were made and/or referrals ordered. Positive Risk Factor Screenings with Interventions:                Opioid Risk: (Low risk score <55) Opioid risk score: 6    Patient is low risk for opioid use disorder or overdose. Last PDMP Steph Villarreal as Reviewed:  Review User Review Instant Review Result              Last Controlled Substance Monitoring Documentation      [address] Office Visit from 5/23/2017 in [address] The Prescription Monitoring Report for this patient was reviewed today.  filed at 05/23/2017 1002   Periodic Controlled Substance Monitoring Medication contract signed today, No signs of potential drug abuse or diversion identified. filed at 05/23/2017 1002              Weight and Activity:  Physical Activity: Insufficiently Active    Days of Exercise per Week: 2 days    Minutes of Exercise per Session: 40 min     On average, how many days per week do you engage in moderate to strenuous exercise (like a brisk walk)?: 2 days  Have you lost any weight without trying in the past 3 months?: No  Body mass index: (!) 35.11  Obesity Interventions:  Pt has started to actively lose weight, has lost about 12 lbs. Objective   Vitals:    01/24/23 0731   BP: 124/80   Site: Left Upper Arm   Position: Sitting   Cuff Size: Large Adult   Pulse: 79   SpO2: 98%   Weight: 211 lb (95.7 kg)   Height: 5' 5\" (1.651 m)      Body mass index is 35.11 kg/m². Allergies   Allergen Reactions    Allopurinol Other (See Comments)     \"made gout worse\"    Morphine Other (See Comments)     Does not like how it makes her feel     Prior to Visit Medications    Medication Sig Taking?  Authorizing Provider   HYDROcodone-acetaminophen (NORCO) 5-325 MG per tablet take 1 tablet by mouth every 6 hours AS NEEDED FOR PAIN Yes Historical Provider, MD   Cyclobenzaprine HCl (FLEXERIL PO) Take by mouth Yes Lorri Turcios MD   lisinopril-hydroCHLOROthiazide (PRINZIDE;ZESTORETIC) 20-12.5 MG per tablet take 1 tablet by mouth once daily Yes ZA Benitez NP   omeprazole (PRILOSEC) 40 MG delayed release capsule take 1 capsule by mouth every morning before breakfast  Patient taking differently: Indications: gets OTC Yes ZA Meredith CNP   ondansetron (ZOFRAN-ODT) 4 MG disintegrating tablet Take 1 tablet by mouth 2 times daily as needed for Nausea or Vomiting  Patient not taking: Reported on 1/24/2023  ZA Meredith CNP       CareTeam (Including outside providers/suppliers regularly involved in providing care):   Patient Care Team:  ZA Meredith CNP as PCP - General (Certified Nurse Practitioner)  ZA Walton CNP as PCP - [facility] Empaneled Provider  ZA Walton CNP as Referring Physician (Certified Nurse Practitioner)     Reviewed and updated this visit:  Allergies  Meds

## 2023-01-24 NOTE — PATIENT INSTRUCTIONS
Advance Directives: Care Instructions  Overview  An advance directive is a legal way to state your wishes at the end of your life. It tells your family and your doctor what to do if you can't say what you want. There are two main types of advance directives. You can change them any time your wishes change. Living will. This form tells your family and your doctor your wishes about life support and other treatment. The form is also called a declaration. Medical power of . This form lets you name a person to make treatment decisions for you when you can't speak for yourself. This person is called a health care agent (health care proxy, health care surrogate). The form is also called a durable power of  for health care. If you do not have an advance directive, decisions about your medical care may be made by a family member, or by a doctor or a  who doesn't know you. It may help to think of an advance directive as a gift to the people who care for you. If you have one, they won't have to make tough decisions by themselves. For more information, including forms for your state, see the [address] website (www.caringinfo.org/planning/advance-directives/). Follow-up care is a key part of your treatment and safety. Be sure to make and go to all appointments, and call your doctor if you are having problems. It's also a good idea to know your test results and keep a list of the medicines you take. What should you include in an advance directive? Many states have a unique advance directive form. (It may ask you to address specific issues.) Or you might use a universal form that's approved by many states. If your form doesn't tell you what to address, it may be hard to know what to include in your advance directive. Use the questions below to help you get started. Who do you want to make decisions about your medical care if you are not able to?   What life-support measures do you want if you have a serious illness that gets worse over time or can't be cured? What are you most afraid of that might happen? (Maybe you're afraid of having pain, losing your independence, or being kept alive by machines.)  Where would you prefer to die? (Your home? A hospital? A nursing home?)  Do you want to donate your organs when you die? Do you want certain Religion practices performed before you die? When should you call for help? Be sure to contact your doctor if you have any questions. Where can you learn more? Go to http://www.perry.com/ and enter R264 to learn more about \"Advance Directives: Care Instructions. \"  Current as of: June 16, 2022               Content Version: 13.5  © 7820-4580 Healthwise, Incorporated. Care instructions adapted under license by [facility] ([facility]). If you have questions about a medical condition or this instruction, always ask your healthcare professional. [place] [address] any warranty or liability for your use of this information. Personalized Preventive Plan for Tamie Monroy - 1/24/2023  Medicare offers a range of preventive health benefits. Some of the tests and screenings are paid in full while other may be subject to a deductible, co-insurance, and/or copay. Some of these benefits include a comprehensive review of your medical history including lifestyle, illnesses that may run in your family, and various assessments and screenings as appropriate. After reviewing your medical record and screening and assessments performed today your provider may have ordered immunizations, labs, imaging, and/or referrals for you. A list of these orders (if applicable) as well as your Preventive Care list are included within your After Visit Summary for your review.     Other Preventive Recommendations:    A preventive eye exam performed by an eye specialist is recommended every 1-2 years to screen for glaucoma; cataracts, macular degeneration, and other eye disorders. A preventive dental visit is recommended every 6 months. Try to get at least 150 minutes of exercise per week or 10,000 steps per day on a pedometer . Order or download the FREE \"Exercise & Physical Activity: Your Everyday Guide\" from The [employer]. Call 3-957.856.3983 or search The [employer] online. You need 4627-6605 mg of calcium and 7409-8718 IU of vitamin D per day. It is possible to meet your calcium requirement with diet alone, but a vitamin D supplement is usually necessary to meet this goal.  When exposed to the sun, use a sunscreen that protects against both UVA and UVB radiation with an SPF of 30 or greater. Reapply every 2 to 3 hours or after sweating, drying off with a towel, or swimming. Always wear a seat belt when traveling in a car. Always wear a helmet when riding a bicycle or motorcycle.

## 2023-01-30 ENCOUNTER — OFFICE VISIT (OUTPATIENT)
Dept: UROLOGY | Age: 73
End: 2023-01-30
Payer: MEDICARE

## 2023-01-30 VITALS
BODY MASS INDEX: 35.16 KG/M2 | HEART RATE: 77 BPM | SYSTOLIC BLOOD PRESSURE: 153 MMHG | TEMPERATURE: 98.6 F | WEIGHT: 211 LBS | HEIGHT: 65 IN | DIASTOLIC BLOOD PRESSURE: 94 MMHG

## 2023-01-30 DIAGNOSIS — R39.12 WEAK URINARY STREAM: ICD-10-CM

## 2023-01-30 DIAGNOSIS — N20.0 KIDNEY STONE: Primary | ICD-10-CM

## 2023-01-30 PROCEDURE — G8399 PT W/DXA RESULTS DOCUMENT: HCPCS | Performed by: UROLOGY

## 2023-01-30 PROCEDURE — 3017F COLORECTAL CA SCREEN DOC REV: CPT | Performed by: UROLOGY

## 2023-01-30 PROCEDURE — G8427 DOCREV CUR MEDS BY ELIG CLIN: HCPCS | Performed by: UROLOGY

## 2023-01-30 PROCEDURE — 1036F TOBACCO NON-USER: CPT | Performed by: UROLOGY

## 2023-01-30 PROCEDURE — 3077F SYST BP >= 140 MM HG: CPT | Performed by: UROLOGY

## 2023-01-30 PROCEDURE — G8484 FLU IMMUNIZE NO ADMIN: HCPCS | Performed by: UROLOGY

## 2023-01-30 PROCEDURE — 99214 OFFICE O/P EST MOD 30 MIN: CPT | Performed by: UROLOGY

## 2023-01-30 PROCEDURE — 1090F PRES/ABSN URINE INCON ASSESS: CPT | Performed by: UROLOGY

## 2023-01-30 PROCEDURE — G8417 CALC BMI ABV UP PARAM F/U: HCPCS | Performed by: UROLOGY

## 2023-01-30 PROCEDURE — 1123F ACP DISCUSS/DSCN MKR DOCD: CPT | Performed by: UROLOGY

## 2023-01-30 PROCEDURE — 3080F DIAST BP >= 90 MM HG: CPT | Performed by: UROLOGY

## 2023-01-30 ASSESSMENT — ENCOUNTER SYMPTOMS
SHORTNESS OF BREATH: 0
WHEEZING: 0
EYE REDNESS: 0
CONSTIPATION: 0
COUGH: 0
VOMITING: 0
NAUSEA: 0
EYE PAIN: 0
DIARRHEA: 0
BACK PAIN: 0
ABDOMINAL PAIN: 0

## 2023-01-30 NOTE — PROGRESS NOTES
[address] [address]  Dept: 92 Yana Burkett [facility] Urology Office Note - Established    Patient:  Ean Monroy  YOB: 1950  Date: 1/30/2023    The patient is a 81 y.o. female whopresents today for evaluation of the following problems:   Chief Complaint   Patient presents with    Follow-up     6 month with KUB       HPI  This is a very pleasant 74-year-old female who has a history of stones. She has not had any significant symptoms of pain from her stones. Her CT from last summer showed small bilateral nonobstructing stones. Her current KUB x-ray does not show any radio visible stones. She does have some voiding dysfunction. Sometimes she has to sit to empty her bladder for quite some time. Other times she sits and that is no issue emptying quickly. Sometimes this symptom is situational.  She is very frustrated with this. She denies any pelvic pain and does not feel that her bladder has dropped. Summary of old records: N/A    Additional History: N/A    Procedures Today: N/A    Urinalysis today:  No results found for this visit on 01/30/23. Imaging Reviewed during this Office Visit: none  (results were independently reviewed by physician and radiology report verified)    AUA Symptom Score (1/30/2023):                                Last BUN and creatinine:  Lab Results   Component Value Date    BUN 15 04/14/2022     Lab Results   Component Value Date    CREATININE 0.80 04/14/2022       Additional Lab/Culture results: none    PAST MEDICAL, FAMILY AND SOCIAL HISTORY UPDATE:  Past Medical History:   Diagnosis Date    Arthritis     osteoarthritis    Gastric nodule     patient reports    Hypertension      Past Surgical History:   Procedure Laterality Date    CHOLECYSTECTOMY, LAPAROSCOPIC  2012    COLONOSCOPY      DILATION AND CURETTAGE OF UTERUS  1982    [place] ARTHRP ACETBLR/PROX FEM PROSTC AGRFT/ALGRFT Left 11/14/2017    HIP TOTAL ARTHROPLASTY ANTERIOR APPROACH (MEDACTA, FASCIA ILIACA BLOCK VS. LUMBAR PLEXUS PRE-OP, SPINAL VS. GENERAL, MEDACTA TABLE, C-ARM) performed by Carlos Edward DO at [address] Bilateral     as child (lazy eye?? )    TUBAL LIGATION      UPPER GASTROINTESTINAL ENDOSCOPY      UPPER GASTROINTESTINAL ENDOSCOPY  9/15/2021    EGD BIOPSY performed by Radha Ashraf MD at [address] [facility] ENDOSCOPY  9/15/2021    EGD W/EUS FNA performed by Radha Ashraf MD at [facility]     Family History   Problem Relation Age of Onset    Heart Disease Mother     Diabetes Father     Diabetes Brother      No outpatient medications have been marked as taking for the 1/30/23 encounter (Office Visit) with Marty Tuttle MD.      (All medications reviewed and updated by provider sincelast office visit or hospitalization)   Allopurinol and Morphine  Social History     Tobacco Use   Smoking Status Never   Smokeless Tobacco Never      (If patient a smoker, smoking cessation counseling offered)     Social History     Substance and Sexual Activity   Alcohol Use Yes    Comment: 1 glass per month       REVIEW OF SYSTEMS:  Review of Systems      Physical Exam:      Vitals:    01/30/23 0857   BP: (!) 153/94   Pulse: 77   Temp: 98.6 °F (37 °C)     Body mass index is 35.11 kg/m². Patient is a 81 y.o. female in noacute distress and alert and oriented to person, place and time. Physical Exam  Constitutional: Patient in no acute distress. Neuro: Alert andoriented to person, place and time. Psych: Mood normal, affect normal  Skin: No rash noted  HEENT: Head: Normocephalic and atraumatic  Conjunctivae and EOM are normal. Pupils are equal, round      and Plan      1. Kidney stone    2.  Weak urinary stream           Plan:   Refer to Roxana PT for voiding dysfunction  F/u 6 mo kub     Return in about 6 months (around 7/30/2023) for kub.    Prescriptions Ordered:  No orders of the defined types were placed in this encounter. Orders Placed:  Orders Placed This Encounter   Procedures    XR ABDOMEN (KUB) (SINGLE AP VIEW)     Standing Status:   Future     Standing Expiration Date:   1/30/2024              Tommy Stallings MD    Agree with the ROS entered by the MA.

## 2023-01-30 NOTE — PROGRESS NOTES
Review of Systems   Constitutional:  Negative for chills, fatigue and fever. Eyes:  Negative for pain, redness and visual disturbance. Respiratory:  Negative for cough, shortness of breath and wheezing. Cardiovascular:  Negative for chest pain and leg swelling. Gastrointestinal:  Negative for abdominal pain, constipation, diarrhea, nausea and vomiting. Genitourinary:  Negative for difficulty urinating, dysuria, flank pain, frequency, hematuria and urgency. Musculoskeletal:  Negative for back pain, joint swelling and myalgias. Skin:  Negative for rash and wound. Neurological:  Negative for dizziness, tremors, weakness and numbness. Hematological:  Does not bruise/bleed easily.

## 2023-02-12 ENCOUNTER — OFFICE VISIT (OUTPATIENT)
Dept: FAMILY MEDICINE CLINIC | Age: 73
End: 2023-02-12

## 2023-02-12 VITALS
HEART RATE: 95 BPM | TEMPERATURE: 98.1 F | WEIGHT: 200 LBS | SYSTOLIC BLOOD PRESSURE: 118 MMHG | OXYGEN SATURATION: 99 % | HEIGHT: 65 IN | BODY MASS INDEX: 33.32 KG/M2 | DIASTOLIC BLOOD PRESSURE: 75 MMHG

## 2023-02-12 DIAGNOSIS — J06.9 VIRAL URI: Primary | ICD-10-CM

## 2023-02-12 DIAGNOSIS — J04.0 LARYNGITIS: ICD-10-CM

## 2023-02-12 ASSESSMENT — ENCOUNTER SYMPTOMS
RHINORRHEA: 0
WHEEZING: 0
HEARTBURN: 0
SORE THROAT: 0
COUGH: 1
HEMOPTYSIS: 0
SHORTNESS OF BREATH: 0

## 2023-02-12 NOTE — PROGRESS NOTES
[address] [address]  Dept: [phone]  Dept Fax: [phone]    Michele Dias is a 81 y.o. female who presents to the urgent care today for her medical conditions/complaints as notedbelow. Michele Dias is c/o of Cough (Cough started last night started loosing voice on Thursday took two covid test at home both negative)      HPI:     81 yr old female presents for cough (last night)hoarse voice (3 days) and congestion. 2 home covid tests were negative. No fevers, feels like head cold but wanted to get checked out  No diff breathing    Cough  This is a new problem. The current episode started yesterday. The problem has been unchanged. The cough is Productive of sputum. Associated symptoms include nasal congestion and postnasal drip. Pertinent negatives include no chest pain, chills, ear congestion, ear pain, fever, headaches, heartburn, hemoptysis, myalgias, rash, rhinorrhea, sore throat, shortness of breath, sweats, weight loss or wheezing. Associated symptoms comments: Hoarse voice. Nothing aggravates the symptoms. Treatments tried: air born and multi symptom. The treatment provided no relief. There is no history of asthma, bronchiectasis, bronchitis, COPD, emphysema, environmental allergies or pneumonia.      Past Medical History:   Diagnosis Date    Arthritis     osteoarthritis    Gastric nodule     patient reports    Hypertension         Current Outpatient Medications   Medication Sig Dispense Refill    HYDROcodone-acetaminophen (NORCO) 5-325 MG per tablet take 1 tablet by mouth every 6 hours AS NEEDED FOR PAIN      Cyclobenzaprine HCl (FLEXERIL PO) Take by mouth      lisinopril-hydroCHLOROthiazide (PRINZIDE;ZESTORETIC) 20-12.5 MG per tablet take 1 tablet by mouth once daily 90 tablet 1    Handicap Placard MISC by Does not apply route 1 each 0    omeprazole (PRILOSEC) 40 MG delayed release capsule take 1 capsule by mouth every morning before breakfast (Patient taking differently: Indications: gets OTC) 30 capsule 5     No current facility-administered medications for this visit. Allergies   Allergen Reactions    Allopurinol Other (See Comments)     \"made gout worse\"    Morphine Other (See Comments)     Does not like how it makes her feel       Subjective:      Review of Systems   Constitutional:  Negative for chills, fever and weight loss. HENT:  Positive for postnasal drip. Negative for ear pain, rhinorrhea and sore throat. Respiratory:  Positive for cough. Negative for hemoptysis, shortness of breath and wheezing. Cardiovascular:  Negative for chest pain. Gastrointestinal:  Negative for heartburn. Musculoskeletal:  Negative for myalgias. Skin:  Negative for rash. Allergic/Immunologic: Negative for environmental allergies. Neurological:  Negative for headaches. All other systems reviewed and are negative. 14 systems reviewed and negative except as listed in HPI. Objective:     Physical Exam  Vitals and nursing note reviewed. Constitutional:       General: She is not in acute distress. Appearance: Normal appearance. She is well-developed. She is not ill-appearing, toxic-appearing or diaphoretic. Comments: nontoxic   HENT:      Head: Normocephalic and atraumatic. Right Ear: Tympanic membrane, ear canal and external ear normal.      Left Ear: Tympanic membrane, ear canal and external ear normal.      Nose: Rhinorrhea present. No congestion. Mouth/Throat:      Mouth: Mucous membranes are moist.      Pharynx: Oropharynx is clear. No oropharyngeal exudate or posterior oropharyngeal erythema. Comments: Swallows without difficulty  Thick white pnd  Eyes:      General: No scleral icterus. Right eye: No discharge. Left eye: No discharge.       Conjunctiva/sclera: Conjunctivae normal.      Pupils: Pupils are equal, round, and reactive to light. Cardiovascular:      Rate and Rhythm: Normal rate and regular rhythm. Pulses: Normal pulses. Heart sounds: Normal heart sounds. Pulmonary:      Effort: Pulmonary effort is normal. No respiratory distress. Breath sounds: Normal breath sounds. No stridor. No wheezing, rhonchi or rales. Comments: Hoarse voice  No tongue elevation      Chest:      Chest wall: No tenderness. Abdominal:      General: Bowel sounds are normal. There is no distension. Palpations: Abdomen is soft. Tenderness: There is no abdominal tenderness. Musculoskeletal:         General: No tenderness or deformity. Normal range of motion. Cervical back: Normal range of motion and neck supple. Lymphadenopathy:      Cervical: No cervical adenopathy. Skin:     General: Skin is warm and dry. Findings: No rash ( no rash to visible skin). Neurological:      General: No focal deficit present. Mental Status: She is alert and oriented to person, place, and time. Motor: No abnormal muscle tone. Coordination: Coordination normal.   Psychiatric:         Mood and Affect: Mood normal.         Behavior: Behavior normal.     /75   Pulse 95   Temp 98.1 °F (36.7 °C)   Ht 5' 5\" (1.651 m)   Wt 200 lb (90.7 kg)   SpO2 99%   BMI 33.28 kg/m²     Assessment:       Diagnosis Orders   1. Viral URI        2. Laryngitis            Plan:    2 negative home covid tests  Reviewed over-the-counter treatments for symptom management. Viral uri on exam  Start home flonase for the PND  Start mucinex to break up the thick mucous  Otc cough syrup  Warm salt water and listerine gargles  Return for Make an Appt. with your Primary Care in 1 week. No orders of the defined types were placed in this encounter. Patient given educational materials - see patient instructions. Discussed use, benefit, and side effects of prescribed medications.   All patient questions answered. Pt voicedunderstanding.     Electronically signed by ZA Rojas CNP on 2/12/2023 at 10:23 AM

## 2023-02-15 ENCOUNTER — OFFICE VISIT (OUTPATIENT)
Dept: FAMILY MEDICINE CLINIC | Age: 73
End: 2023-02-15

## 2023-02-15 VITALS
OXYGEN SATURATION: 98 % | WEIGHT: 208.6 LBS | TEMPERATURE: 97 F | BODY MASS INDEX: 34.71 KG/M2 | SYSTOLIC BLOOD PRESSURE: 120 MMHG | DIASTOLIC BLOOD PRESSURE: 80 MMHG | HEART RATE: 95 BPM

## 2023-02-15 DIAGNOSIS — B96.89 ACUTE BACTERIAL SINUSITIS: Primary | ICD-10-CM

## 2023-02-15 DIAGNOSIS — J01.90 ACUTE BACTERIAL SINUSITIS: Primary | ICD-10-CM

## 2023-02-15 RX ORDER — AMOXICILLIN 875 MG/1
875 TABLET, COATED ORAL 2 TIMES DAILY
Qty: 14 TABLET | Refills: 0 | Status: SHIPPED | OUTPATIENT
Start: 2023-02-15 | End: 2023-02-22

## 2023-02-15 SDOH — ECONOMIC STABILITY: FOOD INSECURITY: WITHIN THE PAST 12 MONTHS, THE FOOD YOU BOUGHT JUST DIDN'T LAST AND YOU DIDN'T HAVE MONEY TO GET MORE.: NEVER TRUE

## 2023-02-15 SDOH — ECONOMIC STABILITY: INCOME INSECURITY: HOW HARD IS IT FOR YOU TO PAY FOR THE VERY BASICS LIKE FOOD, HOUSING, MEDICAL CARE, AND HEATING?: NOT HARD AT ALL

## 2023-02-15 SDOH — ECONOMIC STABILITY: FOOD INSECURITY: WITHIN THE PAST 12 MONTHS, YOU WORRIED THAT YOUR FOOD WOULD RUN OUT BEFORE YOU GOT MONEY TO BUY MORE.: NEVER TRUE

## 2023-02-15 SDOH — ECONOMIC STABILITY: HOUSING INSECURITY
IN THE LAST 12 MONTHS, WAS THERE A TIME WHEN YOU DID NOT HAVE A STEADY PLACE TO SLEEP OR SLEPT IN A SHELTER (INCLUDING NOW)?: NO

## 2023-02-15 ASSESSMENT — ENCOUNTER SYMPTOMS
CONSTIPATION: 0
SWOLLEN GLANDS: 0
NAUSEA: 0
VOMITING: 0
ABDOMINAL PAIN: 0
SINUS COMPLAINT: 1
CHEST TIGHTNESS: 0
DIARRHEA: 0
SHORTNESS OF BREATH: 0
COUGH: 1
SORE THROAT: 0
HOARSE VOICE: 1
CHOKING: 0
ANAL BLEEDING: 0
WHEEZING: 0
SINUS PRESSURE: 1
BLOOD IN STOOL: 0

## 2023-02-15 ASSESSMENT — VISUAL ACUITY: OU: 1

## 2023-02-15 NOTE — PROGRESS NOTES
Pt is here today for having a cold that is not getting better, pt was seen in a walk-in on 02/12/2023, was told she has a head cold, was told if it does not go away in a few days that she will need a antibiotic, pt was not given an meds at the walk-in    Pt tested herself for Angi on 02/12/2023 that was negative    Symptoms are coughing up green stuff, sinuses are raw and painful, some ear pain,     No body aches, no fever. chills, no sore throat, no chest pain, no SOB

## 2023-02-15 NOTE — PROGRESS NOTES
[facility]  [facility] [address]  [facility] [address]  Dept: [phone]  Dept Fax: [phone]      Shauna Wooten is a 81 y.o. female who presents today for hermedical conditions/complaints as noted below. Shauna Wooten is c/o of Congestion        Assessment/Plan:     1. Acute bacterial sinusitis  -     amoxicillin (AMOXIL) 875 MG tablet; Take 1 tablet by mouth 2 times daily for 7 days, Disp-14 tablet, R-0Normal    Continue Sinus rinse, flonase, nasal saline       No follow-ups on file. HPI     Sinus Problem  This is a new problem. The current episode started 1 to 4 weeks ago. The problem has been gradually worsening since onset. There has been no fever. Associated symptoms include congestion, coughing, a hoarse voice and sinus pressure. Pertinent negatives include no chills, diaphoresis, ear pain, headaches, neck pain, shortness of breath, sneezing, sore throat or swollen glands. Treatments tried: claritin D, cough syrup, flonase. The treatment provided no relief. BP Readings from Last 3 Encounters:   02/15/23 120/80   02/12/23 118/75   01/30/23 (!) 153/94              Past Medical History:   Diagnosis Date    Arthritis     osteoarthritis    Gastric nodule     patient reports    Hypertension       Past Surgical History:   Procedure Laterality Date    CHOLECYSTECTOMY, LAPAROSCOPIC  2012    COLONOSCOPY      DILATION AND CURETTAGE OF UTERUS  1982    OK ARTHRP ACETBLR/PROX FEM PROSTC AGRFT/ALGRFT Left 11/14/2017    HIP TOTAL ARTHROPLASTY ANTERIOR APPROACH (MEDACTA, FASCIA ILIACA BLOCK VS. LUMBAR PLEXUS PRE-OP, SPINAL VS. GENERAL, MEDACTA TABLE, C-ARM) performed by Nathanael Meigs, DO at [address] [place] [address] Bilateral     as child (lazy eye?? )    TUBAL LIGATION      UPPER GASTROINTESTINAL ENDOSCOPY      UPPER GASTROINTESTINAL ENDOSCOPY  9/15/2021    EGD BIOPSY performed by Rupert Valle MD at [facility]    UPPER GASTROINTESTINAL ENDOSCOPY  9/15/2021    EGD W/EUS FNA performed by Pavan Coy MD at [facility]       Family History   Problem Relation Age of Onset    Heart Disease Mother     Diabetes Father     Diabetes Brother        Social History     Tobacco Use    Smoking status: Never    Smokeless tobacco: Never   Substance Use Topics    Alcohol use: Yes     Comment: 1 glass per month        Allergies   Allergen Reactions    Allopurinol Other (See Comments)     \"made gout worse\"    Morphine Other (See Comments)     Does not like how it makes her feel     Prior to Visit Medications    Medication Sig Taking? Authorizing Provider   HYDROcodone-acetaminophen (NORCO) 5-325 MG per tablet take 1 tablet by mouth every 6 hours AS NEEDED FOR PAIN Yes Historical Provider, MD   Cyclobenzaprine HCl (FLEXERIL PO) Take by mouth Yes Lenny Estevez MD   lisinopril-hydroCHLOROthiazide (PRINZIDE;ZESTORETIC) 20-12.5 MG per tablet take 1 tablet by mouth once daily Yes ZA Collins CNP   Handicap Placard MISC by Does not apply route Yes ZA Collins CNP   omeprazole (PRILOSEC) 40 MG delayed release capsule take 1 capsule by mouth every morning before breakfast  Patient taking differently: Indications: gets OTC Yes ZA Collins CNP       Review of Systems     Review of Systems   Constitutional:  Negative for chills, diaphoresis, fatigue, fever and unexpected weight change. HENT:  Positive for congestion, hoarse voice and sinus pressure. Negative for ear pain, sneezing and sore throat. Respiratory:  Positive for cough. Negative for choking, chest tightness, shortness of breath and wheezing. Cardiovascular:  Negative for chest pain, palpitations and leg swelling. Gastrointestinal:  Negative for abdominal pain, anal bleeding, blood in stool, constipation, diarrhea, nausea and vomiting. Endocrine: Negative.     Musculoskeletal:  Negative for joint swelling, myalgias and neck pain.   Skin: Negative. Neurological:  Negative for dizziness and headaches. Psychiatric/Behavioral:  Negative for sleep disturbance. All other systems reviewed and are negative. Objective     /80 (Site: Left Upper Arm, Position: Sitting, Cuff Size: Large Adult)   Pulse 95   Temp 97 °F (36.1 °C)   Wt 208 lb 9.6 oz (94.6 kg)   SpO2 98%   BMI 34.71 kg/m²   Physical Exam  Vitals and nursing note reviewed. Constitutional:       General: She is not in acute distress. Appearance: She is well-developed. She is not ill-appearing. HENT:      Nose: Mucosal edema and rhinorrhea present. Rhinorrhea is purulent. Right Turbinates: Swollen. Left Turbinates: Swollen. Right Sinus: Maxillary sinus tenderness present. No frontal sinus tenderness. Left Sinus: Maxillary sinus tenderness present. No frontal sinus tenderness. Eyes:      General: Lids are normal. Vision grossly intact. Cardiovascular:      Rate and Rhythm: Normal rate and regular rhythm. Heart sounds: Normal heart sounds, S1 normal and S2 normal. No murmur heard. No friction rub. No gallop. Pulmonary:      Effort: Pulmonary effort is normal. No respiratory distress. Breath sounds: Normal breath sounds. No wheezing. Abdominal:      General: Bowel sounds are normal.      Palpations: Abdomen is soft. There is no mass. Tenderness: There is no abdominal tenderness. There is no guarding. Musculoskeletal:         General: Normal range of motion. Skin:     General: Skin is warm and dry. Capillary Refill: Capillary refill takes less than 2 seconds. Neurological:      General: No focal deficit present. Mental Status: She is alert and oriented to person, place, and time. Data Review     Walk-in clinic note reviewed     There are no preventive care reminders to display for this patient. Patient given educational materials- see patient instructions.   Discussed use, benefit, and side effects of prescribedmedications. All patient questions answered. Pt voiced understanding. Reviewedhealth maintenance. Instructed to continue current medications, diet and exercise. Patient agreed with treatment plan. Follow up as directed.      Electronically signedby Evangelist Sanchez MD on 2/15/2023

## 2023-03-09 DIAGNOSIS — N39.8 VOIDING DYSFUNCTION: Primary | ICD-10-CM

## 2023-06-29 ENCOUNTER — OFFICE VISIT (OUTPATIENT)
Dept: FAMILY MEDICINE CLINIC | Age: 73
End: 2023-06-29

## 2023-06-29 VITALS
RESPIRATION RATE: 20 BRPM | HEIGHT: 65 IN | DIASTOLIC BLOOD PRESSURE: 82 MMHG | SYSTOLIC BLOOD PRESSURE: 124 MMHG | HEART RATE: 90 BPM | BODY MASS INDEX: 34.99 KG/M2 | WEIGHT: 210 LBS | OXYGEN SATURATION: 97 %

## 2023-06-29 DIAGNOSIS — I10 ESSENTIAL HYPERTENSION: ICD-10-CM

## 2023-06-29 DIAGNOSIS — R73.9 HYPERGLYCEMIA: ICD-10-CM

## 2023-06-29 DIAGNOSIS — H66.92 ACUTE OTITIS MEDIA, LEFT: Primary | ICD-10-CM

## 2023-06-29 DIAGNOSIS — E78.1 HIGH TRIGLYCERIDES: ICD-10-CM

## 2023-06-29 RX ORDER — AMOXICILLIN 875 MG/1
875 TABLET, COATED ORAL 2 TIMES DAILY
Qty: 14 TABLET | Refills: 0 | Status: SHIPPED | OUTPATIENT
Start: 2023-06-29 | End: 2023-07-06

## 2023-06-29 ASSESSMENT — ENCOUNTER SYMPTOMS
RHINORRHEA: 0
SINUS PRESSURE: 0
SINUS PAIN: 0
COUGH: 0
SORE THROAT: 0

## 2023-07-05 ENCOUNTER — TELEPHONE (OUTPATIENT)
Dept: FAMILY MEDICINE CLINIC | Age: 73
End: 2023-07-05

## 2023-07-05 DIAGNOSIS — H66.92 ACUTE OTITIS MEDIA, LEFT: Primary | ICD-10-CM

## 2023-07-05 RX ORDER — AMOXICILLIN AND CLAVULANATE POTASSIUM 875; 125 MG/1; MG/1
1 TABLET, FILM COATED ORAL 2 TIMES DAILY
Qty: 14 TABLET | Refills: 0 | Status: SHIPPED | OUTPATIENT
Start: 2023-07-05 | End: 2023-07-12

## 2023-07-05 RX ORDER — OFLOXACIN 3 MG/ML
5 SOLUTION AURICULAR (OTIC) 2 TIMES DAILY
Qty: 10 ML | Refills: 0 | Status: SHIPPED | OUTPATIENT
Start: 2023-07-05 | End: 2023-07-15

## 2023-07-05 NOTE — TELEPHONE ENCOUNTER
Will send in Augmentin and ear drops  Advise probiotics to help protect her gut from antibiotic use  If no resolution in symptoms suggest ENT referral

## 2023-07-05 NOTE — TELEPHONE ENCOUNTER
Patient called stating she is on her last pill of the amoxicillin. States she has not had any improvement with her ear pain. Please advise.

## 2023-07-14 DIAGNOSIS — I10 ESSENTIAL HYPERTENSION: ICD-10-CM

## 2023-07-14 RX ORDER — LISINOPRIL AND HYDROCHLOROTHIAZIDE 20; 12.5 MG/1; MG/1
TABLET ORAL
Qty: 90 TABLET | Refills: 1 | Status: SHIPPED | OUTPATIENT
Start: 2023-07-14

## 2023-07-14 NOTE — TELEPHONE ENCOUNTER
Last visit: 6/29/23  Last Med refill: 1/24/23  Does patient have enough medication for 72 hours: Yes    Next Visit Date:  Future Appointments   Date Time Provider [address]   7/31/2023  9:20 AM Joshua Stout  [facility] Maintenance   Topic Date Due    A1C test (Diabetic or Prediabetic)  04/14/2023    Breast cancer screen  07/13/2023    Flu vaccine (1) 08/01/2023    Depression Screen  01/24/2024    Annual Wellness Visit (AWV)  01/25/2024    Lipids  04/14/2027    DTaP/Tdap/Td vaccine (2 - Td or Tdap) 08/21/2027    Colorectal Cancer Screen  02/28/2029    DEXA (modify frequency per FRAX score)  Completed    Shingles vaccine  Completed    Pneumococcal 65+ years Vaccine  Completed    COVID-19 Vaccine  Completed    Hepatitis C screen  Completed    Hepatitis A vaccine  Aged Out    Hib vaccine  Aged Out    Meningococcal (ACWY) vaccine  Aged Out       Hemoglobin A1C (%)   Date Value   04/14/2022 6.0   10/14/2020 5.9   02/11/2019 5.9             ( goal A1C is < 7)   No results found for: LABMICR  LDL Cholesterol (mg/dL)   Date Value   04/14/2022 109   10/14/2020 62       (goal LDL is <100)   AST (U/L)   Date Value   04/14/2022 31     ALT (U/L)   Date Value   04/14/2022 38 (H)     BUN (mg/dL)   Date Value   04/14/2022 15     BP Readings from Last 3 Encounters:   06/29/23 124/82   02/15/23 120/80   02/12/23 118/75          (goal 120/80)    All Future Testing planned in [facility]  Lab Frequency Next Occurrence   ANALY DIGITAL SCREEN W OR WO CAD BILATERAL Once 01/24/2023   XR ABDOMEN (KUB) (SINGLE AP VIEW) Once 07/30/2023   Hemoglobin A1C Once 06/29/2023   Lipid Panel Once 06/29/2023   CBC with Auto Differential Once 06/29/2023   Comprehensive Metabolic Panel Once 65/32/2862               Patient Active Problem List:     Essential hypertension     Osteopenia     Status post total replacement of left hip     Obesity (BMI 30-39. 9)     Hyperglycemia     High triglycerides

## 2023-07-25 ENCOUNTER — TELEPHONE (OUTPATIENT)
Dept: GASTROENTEROLOGY | Age: 73
End: 2023-07-25

## 2023-07-25 NOTE — TELEPHONE ENCOUNTER
Patient to be scheduled for EUS  Dx: GIST nonmalignant      Writer lvm to call to schedule procedure.     NWO records in media

## 2023-07-27 ENCOUNTER — HOSPITAL ENCOUNTER (OUTPATIENT)
Facility: CLINIC | Age: 73
Discharge: HOME | End: 2023-07-29
Payer: MEDICARE

## 2023-07-27 ENCOUNTER — HOSPITAL ENCOUNTER (OUTPATIENT)
Dept: GENERAL RADIOLOGY | Facility: CLINIC | Age: 73
Discharge: HOME | End: 2023-07-29
Payer: MEDICARE

## 2023-07-27 DIAGNOSIS — N20.0 KIDNEY STONE: ICD-10-CM

## 2023-07-27 PROCEDURE — 74018 RADEX ABDOMEN 1 VIEW: CPT

## 2023-07-27 NOTE — TELEPHONE ENCOUNTER
7/27/23- Patient returned call to schedule procedure. Writer explained  is out of office until Monday and patient will receive a call next week to schedule.

## 2023-07-31 ENCOUNTER — OFFICE VISIT (OUTPATIENT)
Dept: UROLOGY | Age: 73
End: 2023-07-31
Payer: MEDICARE

## 2023-07-31 VITALS — DIASTOLIC BLOOD PRESSURE: 83 MMHG | TEMPERATURE: 97.8 F | HEART RATE: 78 BPM | SYSTOLIC BLOOD PRESSURE: 151 MMHG

## 2023-07-31 DIAGNOSIS — N20.0 KIDNEY STONE: Primary | ICD-10-CM

## 2023-07-31 DIAGNOSIS — R10.9 FLANK PAIN: ICD-10-CM

## 2023-07-31 DIAGNOSIS — R39.12 WEAK URINARY STREAM: ICD-10-CM

## 2023-07-31 PROCEDURE — G8417 CALC BMI ABV UP PARAM F/U: HCPCS | Performed by: UROLOGY

## 2023-07-31 PROCEDURE — 1036F TOBACCO NON-USER: CPT | Performed by: UROLOGY

## 2023-07-31 PROCEDURE — 99214 OFFICE O/P EST MOD 30 MIN: CPT | Performed by: UROLOGY

## 2023-07-31 PROCEDURE — G8399 PT W/DXA RESULTS DOCUMENT: HCPCS | Performed by: UROLOGY

## 2023-07-31 PROCEDURE — 1090F PRES/ABSN URINE INCON ASSESS: CPT | Performed by: UROLOGY

## 2023-07-31 PROCEDURE — 3077F SYST BP >= 140 MM HG: CPT | Performed by: UROLOGY

## 2023-07-31 PROCEDURE — G8427 DOCREV CUR MEDS BY ELIG CLIN: HCPCS | Performed by: UROLOGY

## 2023-07-31 PROCEDURE — 3079F DIAST BP 80-89 MM HG: CPT | Performed by: UROLOGY

## 2023-07-31 PROCEDURE — 1123F ACP DISCUSS/DSCN MKR DOCD: CPT | Performed by: UROLOGY

## 2023-07-31 PROCEDURE — 3017F COLORECTAL CA SCREEN DOC REV: CPT | Performed by: UROLOGY

## 2023-07-31 ASSESSMENT — ENCOUNTER SYMPTOMS
BACK PAIN: 0
WHEEZING: 0
ABDOMINAL PAIN: 0
COUGH: 0
EYE REDNESS: 0
EYE PAIN: 0
NAUSEA: 0
VOMITING: 0
COLOR CHANGE: 0
SHORTNESS OF BREATH: 0

## 2023-07-31 NOTE — PROGRESS NOTES
[address]  [address] [facility] [address]  Dept: [address] [place] [address] Urology Office Note - Established    Patient:  Davy Rose  YOB: 1950  Date: 7/31/2023    The patient is a 72 y.o. female whopresents today for evaluation of the following problems:   Chief Complaint   Patient presents with    Nephrolithiasis     KUB results - feels she just passed another stone, left flank pain         HPI  This is a very pleasant 72-year-old female who has a history of voiding dysfunction and stones. She did have pelvic floor therapy which is helped her tremendously with emptying her bladder. Her KUB x-ray does not show any obvious stones but she has passed several stones over the last few months and did have a 10-day period of severe pain. She has never had a metabolic work-up done before. Summary of old records: N/A    Additional History: N/A    Procedures Today: N/A    Urinalysis today:  No results found for this visit on 07/31/23. Imaging Reviewed during this Office Visit: none  (results were independently reviewed by physician and radiology report verified)    AUA Symptom Score (7/31/2023):                                Last BUN and creatinine:  Lab Results   Component Value Date    BUN 15 04/14/2022     Lab Results   Component Value Date    CREATININE 0.80 04/14/2022       Additional Lab/Culture results: none    PAST MEDICAL, FAMILY AND SOCIAL HISTORY UPDATE:  Past Medical History:   Diagnosis Date    Arthritis     osteoarthritis    Gastric nodule     patient reports    Hypertension      Past Surgical History:   Procedure Laterality Date    CHOLECYSTECTOMY, LAPAROSCOPIC  2012    COLONOSCOPY      DILATION AND CURETTAGE OF UTERUS  1982    SD ARTHRP ACETBLR/PROX FEM PROSTC AGRFT/ALGRFT Left 11/14/2017    HIP TOTAL ARTHROPLASTY ANTERIOR APPROACH (MEDACTA, FASCIA ILIACA BLOCK VS. LUMBAR PLEXUS PRE-OP,

## 2023-07-31 NOTE — PROGRESS NOTES
Review of Systems   Constitutional:  Negative for activity change, chills and fever. Eyes:  Negative for pain, redness and visual disturbance. Respiratory:  Negative for cough, shortness of breath and wheezing. Cardiovascular:  Negative for chest pain and leg swelling. Gastrointestinal:  Negative for abdominal pain, nausea and vomiting. Genitourinary:  Negative for difficulty urinating, dysuria, frequency, hematuria, pelvic pain, vaginal bleeding and vaginal discharge. Musculoskeletal:  Negative for back pain, joint swelling and myalgias. Skin:  Negative for color change and rash. Neurological:  Negative for dizziness, tremors and numbness. Hematological:  Negative for adenopathy. Does not bruise/bleed easily.

## 2023-08-01 NOTE — TELEPHONE ENCOUNTER
Procedure Scheduled/Asafdani  EUS with path  Dx:  GIST nonmalignant  Referring: Dr Hao Caldwell  9/13/23  11:30am/Arrive 10:00  [facility]    EUS instructions mailed to patient. Patient advised by phone/mailed.

## 2023-08-08 ENCOUNTER — OFFICE VISIT (OUTPATIENT)
Dept: FAMILY MEDICINE CLINIC | Age: 73
End: 2023-08-08
Payer: MEDICARE

## 2023-08-08 VITALS
HEART RATE: 76 BPM | TEMPERATURE: 97.5 F | SYSTOLIC BLOOD PRESSURE: 124 MMHG | OXYGEN SATURATION: 95 % | DIASTOLIC BLOOD PRESSURE: 80 MMHG | WEIGHT: 209 LBS | BODY MASS INDEX: 34.78 KG/M2

## 2023-08-08 DIAGNOSIS — J01.90 ACUTE BACTERIAL SINUSITIS: Primary | ICD-10-CM

## 2023-08-08 DIAGNOSIS — B96.89 ACUTE BACTERIAL SINUSITIS: Primary | ICD-10-CM

## 2023-08-08 PROCEDURE — G8417 CALC BMI ABV UP PARAM F/U: HCPCS | Performed by: INTERNAL MEDICINE

## 2023-08-08 PROCEDURE — 1123F ACP DISCUSS/DSCN MKR DOCD: CPT | Performed by: INTERNAL MEDICINE

## 2023-08-08 PROCEDURE — 1090F PRES/ABSN URINE INCON ASSESS: CPT | Performed by: INTERNAL MEDICINE

## 2023-08-08 PROCEDURE — 3074F SYST BP LT 130 MM HG: CPT | Performed by: INTERNAL MEDICINE

## 2023-08-08 PROCEDURE — G8399 PT W/DXA RESULTS DOCUMENT: HCPCS | Performed by: INTERNAL MEDICINE

## 2023-08-08 PROCEDURE — 99214 OFFICE O/P EST MOD 30 MIN: CPT | Performed by: INTERNAL MEDICINE

## 2023-08-08 PROCEDURE — 3079F DIAST BP 80-89 MM HG: CPT | Performed by: INTERNAL MEDICINE

## 2023-08-08 PROCEDURE — 3017F COLORECTAL CA SCREEN DOC REV: CPT | Performed by: INTERNAL MEDICINE

## 2023-08-08 PROCEDURE — G8427 DOCREV CUR MEDS BY ELIG CLIN: HCPCS | Performed by: INTERNAL MEDICINE

## 2023-08-08 PROCEDURE — 1036F TOBACCO NON-USER: CPT | Performed by: INTERNAL MEDICINE

## 2023-08-08 RX ORDER — DOXYCYCLINE HYCLATE 100 MG
100 TABLET ORAL 2 TIMES DAILY
Qty: 14 TABLET | Refills: 0 | Status: SHIPPED | OUTPATIENT
Start: 2023-08-08 | End: 2023-08-15

## 2023-08-08 ASSESSMENT — ENCOUNTER SYMPTOMS
CHOKING: 0
WHEEZING: 0
SINUS COMPLAINT: 1
SHORTNESS OF BREATH: 0
NAUSEA: 0
ANAL BLEEDING: 0
SINUS PRESSURE: 1
ABDOMINAL PAIN: 0
SWOLLEN GLANDS: 0
COUGH: 1
BLOOD IN STOOL: 0
SORE THROAT: 0
CHEST TIGHTNESS: 0
CONSTIPATION: 0
DIARRHEA: 0
HOARSE VOICE: 0
VOMITING: 0

## 2023-08-08 ASSESSMENT — VISUAL ACUITY: OU: 1

## 2023-08-08 NOTE — PROGRESS NOTES
Pt is here today for having cold symptoms for a week now    Pt states she has some green/yellow mucus coming out, LT ear is painful, both ears are itchy, facial sinus pressure, drainage down back of throat, headache, eyes are watery and puffy    No fever/chills, no sore throat, no body aches, no vomiting, no diarrhea, no nausea, no pian in gums
UPPER GASTROINTESTINAL ENDOSCOPY  9/15/2021    EGD W/EUS FNA performed by Milton Gilmore MD at [facility]       Family History   Problem Relation Age of Onset    Heart Disease Mother     Diabetes Father     Diabetes Brother        Social History     Tobacco Use    Smoking status: Never    Smokeless tobacco: Never   Substance Use Topics    Alcohol use: Yes     Comment: 1 glass per month        Allergies   Allergen Reactions    Allopurinol Other (See Comments)     \"made gout worse\"    Morphine Other (See Comments)     Does not like how it makes her feel     Prior to Visit Medications    Medication Sig Taking? Authorizing Provider   lisinopril-hydroCHLOROthiazide (PRINZIDE;ZESTORETIC) 20-12.5 MG per tablet take 1 tablet by mouth once daily Yes ZA Sen NP   HYDROcodone-acetaminophen ([facility]) 5-325 MG per tablet take 1 tablet by mouth every 6 hours AS NEEDED FOR PAIN Yes Historical Provider, MD   Cyclobenzaprine HCl (FLEXERIL PO) Take by mouth Yes Emani Maloney MD   omeprazole (PRILOSEC) 40 MG delayed release capsule take 1 capsule by mouth every morning before breakfast  Patient taking differently: Indications: gets OTC Yes ZA Linder CNP   Handicap Placard MISC by Does not apply route  ZA Linder CNP       Review of Systems     Review of Systems   Constitutional:  Negative for chills, diaphoresis, fatigue, fever and unexpected weight change. HENT:  Positive for congestion, ear pain (left ear) and sinus pressure. Negative for hoarse voice, sneezing and sore throat. Respiratory:  Positive for cough (occasionally productive). Negative for choking, chest tightness, shortness of breath and wheezing. Cardiovascular:  Negative for chest pain, palpitations and leg swelling. Gastrointestinal:  Negative for abdominal pain, anal bleeding, blood in stool, constipation, diarrhea, nausea and vomiting. Endocrine: Negative.     Musculoskeletal:  Negative for

## 2023-08-15 ENCOUNTER — HOSPITAL ENCOUNTER (OUTPATIENT)
Age: 73
Setting detail: SPECIMEN
Discharge: HOME | End: 2023-08-15

## 2023-08-15 DIAGNOSIS — R73.9 HYPERGLYCEMIA: ICD-10-CM

## 2023-08-15 DIAGNOSIS — I10 ESSENTIAL HYPERTENSION: ICD-10-CM

## 2023-08-15 DIAGNOSIS — N20.0 KIDNEY STONE: ICD-10-CM

## 2023-08-15 DIAGNOSIS — E78.1 HIGH TRIGLYCERIDES: ICD-10-CM

## 2023-08-15 LAB
ALBUMIN SERPL-MCNC: 4.8 G/DL (ref 3.5–5.2)
ALBUMIN/GLOB SERPL: 1.5 {RATIO} (ref 1–2.5)
ALP SERPL-CCNC: 90 U/L (ref 35–104)
ALT SERPL-CCNC: 24 U/L (ref 5–33)
ANION GAP SERPL CALCULATED.3IONS-SCNC: 12 MMOL/L (ref 9–17)
ANION GAP SERPL CALCULATED.3IONS-SCNC: 12 MMOL/L (ref 9–17)
AST SERPL-CCNC: 27 U/L
BASOPHILS # BLD: 0.05 K/UL (ref 0–0.2)
BASOPHILS NFR BLD: 1 % (ref 0–2)
BILIRUB SERPL-MCNC: 0.5 MG/DL (ref 0.3–1.2)
BUN SERPL-MCNC: 21 MG/DL (ref 8–23)
BUN SERPL-MCNC: 21 MG/DL (ref 8–23)
CALCIUM SERPL-MCNC: 11.2 MG/DL (ref 8.6–10.4)
CALCIUM SERPL-MCNC: 11.2 MG/DL (ref 8.6–10.4)
CHLORIDE SERPL-SCNC: 101 MMOL/L (ref 98–107)
CHLORIDE SERPL-SCNC: 101 MMOL/L (ref 98–107)
CHOLEST SERPL-MCNC: 212 MG/DL
CHOLESTEROL/HDL RATIO: 2.3
CO2 SERPL-SCNC: 26 MMOL/L (ref 20–31)
CO2 SERPL-SCNC: 26 MMOL/L (ref 20–31)
CREAT SERPL-MCNC: 0.9 MG/DL (ref 0.5–0.9)
CREAT SERPL-MCNC: 0.9 MG/DL (ref 0.5–0.9)
EOSINOPHIL # BLD: 0.07 K/UL (ref 0–0.44)
EOSINOPHILS RELATIVE PERCENT: 1 % (ref 1–4)
ERYTHROCYTE [DISTWIDTH] IN BLOOD BY AUTOMATED COUNT: 13.9 % (ref 11.8–14.4)
GFR SERPL CREATININE-BSD FRML MDRD: >60 ML/MIN/1.73M2
GFR SERPL CREATININE-BSD FRML MDRD: >60 ML/MIN/1.73M2
GLUCOSE SERPL-MCNC: 105 MG/DL (ref 70–99)
GLUCOSE SERPL-MCNC: 105 MG/DL (ref 70–99)
HCT VFR BLD AUTO: 46.5 % (ref 36.3–47.1)
HDLC SERPL-MCNC: 92 MG/DL
HGB BLD-MCNC: 14.9 G/DL (ref 11.9–15.1)
IMM GRANULOCYTES # BLD AUTO: 0.04 K/UL (ref 0–0.3)
IMM GRANULOCYTES NFR BLD: 1 %
LDLC SERPL CALC-MCNC: 82 MG/DL (ref 0–130)
LYMPHOCYTES NFR BLD: 1.64 K/UL (ref 1.1–3.7)
LYMPHOCYTES RELATIVE PERCENT: 26 % (ref 24–43)
MCH RBC QN AUTO: 29.5 PG (ref 25.2–33.5)
MCHC RBC AUTO-ENTMCNC: 32 G/DL (ref 28.4–34.8)
MCV RBC AUTO: 92.1 FL (ref 82.6–102.9)
MONOCYTES NFR BLD: 0.68 K/UL (ref 0.1–1.2)
MONOCYTES NFR BLD: 11 % (ref 3–12)
NEUTROPHILS NFR BLD: 60 % (ref 36–65)
NEUTS SEG NFR BLD: 3.79 K/UL (ref 1.5–8.1)
NRBC BLD-RTO: 0 PER 100 WBC
PLATELET # BLD AUTO: 247 K/UL (ref 138–453)
PMV BLD AUTO: 11.7 FL (ref 8.1–13.5)
POTASSIUM SERPL-SCNC: 4.4 MMOL/L (ref 3.7–5.3)
POTASSIUM SERPL-SCNC: 4.4 MMOL/L (ref 3.7–5.3)
PROT SERPL-MCNC: 8.1 G/DL (ref 6.4–8.3)
PTH-INTACT SERPL-MCNC: 69.6 PG/ML (ref 14–72)
RBC # BLD AUTO: 5.05 M/UL (ref 3.95–5.11)
SODIUM SERPL-SCNC: 139 MMOL/L (ref 135–144)
SODIUM SERPL-SCNC: 139 MMOL/L (ref 135–144)
TRIGL SERPL-MCNC: 192 MG/DL
URATE SERPL-MCNC: 6.1 MG/DL (ref 2.4–5.7)
WBC OTHER # BLD: 6.3 K/UL (ref 3.5–11.3)

## 2023-08-16 LAB
EST. AVERAGE GLUCOSE BLD GHB EST-MCNC: 117 MG/DL
HBA1C MFR BLD: 5.7 % (ref 4–6)

## 2023-08-23 DIAGNOSIS — E83.52 HYPERCALCEMIA: Primary | ICD-10-CM

## 2023-08-28 DIAGNOSIS — R73.9 HYPERGLYCEMIA: Primary | ICD-10-CM

## 2023-08-28 DIAGNOSIS — E78.5 HYPERLIPIDEMIA, UNSPECIFIED HYPERLIPIDEMIA TYPE: ICD-10-CM

## 2023-08-28 RX ORDER — ATORVASTATIN CALCIUM 10 MG/1
10 TABLET, FILM COATED ORAL DAILY
Qty: 90 TABLET | Refills: 1 | Status: SHIPPED | OUTPATIENT
Start: 2023-08-28

## 2023-08-30 ENCOUNTER — OFFICE VISIT (OUTPATIENT)
Dept: UROLOGY | Age: 73
End: 2023-08-30
Payer: MEDICARE

## 2023-08-30 ENCOUNTER — HOSPITAL ENCOUNTER (OUTPATIENT)
Age: 73
Setting detail: SPECIMEN
Discharge: HOME | End: 2023-08-30

## 2023-08-30 VITALS
SYSTOLIC BLOOD PRESSURE: 118 MMHG | BODY MASS INDEX: 34.99 KG/M2 | DIASTOLIC BLOOD PRESSURE: 78 MMHG | WEIGHT: 210 LBS | RESPIRATION RATE: 16 BRPM | OXYGEN SATURATION: 98 % | HEART RATE: 96 BPM | TEMPERATURE: 95.6 F | HEIGHT: 65 IN

## 2023-08-30 DIAGNOSIS — R11.0 NAUSEA: ICD-10-CM

## 2023-08-30 DIAGNOSIS — R10.9 RIGHT FLANK PAIN: Primary | ICD-10-CM

## 2023-08-30 DIAGNOSIS — Z87.442 HISTORY OF KIDNEY STONES: ICD-10-CM

## 2023-08-30 PROCEDURE — G8417 CALC BMI ABV UP PARAM F/U: HCPCS | Performed by: NURSE PRACTITIONER

## 2023-08-30 PROCEDURE — 3017F COLORECTAL CA SCREEN DOC REV: CPT | Performed by: NURSE PRACTITIONER

## 2023-08-30 PROCEDURE — 3074F SYST BP LT 130 MM HG: CPT | Performed by: NURSE PRACTITIONER

## 2023-08-30 PROCEDURE — G8427 DOCREV CUR MEDS BY ELIG CLIN: HCPCS | Performed by: NURSE PRACTITIONER

## 2023-08-30 PROCEDURE — 1036F TOBACCO NON-USER: CPT | Performed by: NURSE PRACTITIONER

## 2023-08-30 PROCEDURE — G8399 PT W/DXA RESULTS DOCUMENT: HCPCS | Performed by: NURSE PRACTITIONER

## 2023-08-30 PROCEDURE — 1090F PRES/ABSN URINE INCON ASSESS: CPT | Performed by: NURSE PRACTITIONER

## 2023-08-30 PROCEDURE — 3078F DIAST BP <80 MM HG: CPT | Performed by: NURSE PRACTITIONER

## 2023-08-30 PROCEDURE — 1123F ACP DISCUSS/DSCN MKR DOCD: CPT | Performed by: NURSE PRACTITIONER

## 2023-08-30 PROCEDURE — 99214 OFFICE O/P EST MOD 30 MIN: CPT | Performed by: NURSE PRACTITIONER

## 2023-08-30 ASSESSMENT — ENCOUNTER SYMPTOMS
CONSTIPATION: 0
EYE PAIN: 0
NAUSEA: 0
COUGH: 0
SHORTNESS OF BREATH: 0
EYE REDNESS: 0
WHEEZING: 0
ABDOMINAL PAIN: 0
VOMITING: 0
BACK PAIN: 0

## 2023-08-30 NOTE — PROGRESS NOTES
[address]  1847 [facility] [address]  Dept: [address] [place] [address] Road Urology Office Note - Established    Patient:  Joan Monroy  YOB: 1950  Date: 8/30/2023    The patient is a 72 y.o. female who presents todayfor evaluation of the following problems:   Chief Complaint   Patient presents with    Flank Pain     Right-2 weeks ago started passing kidney stones, soreness on rt side. Flexeril helps with soreness        HPI  Patient is a 71 yo female with hx of stones presenting for right flank pain. Pain started 2 weeks ago, described as severe and pulsating, radiates down right side. Pain worsened over the weekend. She has tried Vicodin and flexeril for her pain, which has not provided much relief. Her symptoms are slowly improving. Associated symptoms: nausea- denies hematuria, dysuria, fever/chills, vomiting, frequency, urgency or incontinence. She is scheduled for fu in 1 mo with [facility]. Summary of old records: N/A    Additional History: N/A    Procedures Today: N/A    Urinalysis today:  No results found for this visit on 08/30/23.   Last several PSA's:  No results found for: PSA  Last total testosterone:  No results found for: TESTOSTERONE      Last BUN and creatinine:  Lab Results   Component Value Date    BUN 21 08/15/2023    BUN 21 08/15/2023     Lab Results   Component Value Date    CREATININE 0.9 08/15/2023    CREATININE 0.9 08/15/2023       Additional Lab/Culture results: none    Imaging Reviewed during this Office Visit: none      PAST MEDICAL, FAMILY AND SOCIAL HISTORY UPDATE:  Past Medical History:   Diagnosis Date    Arthritis     osteoarthritis    Gastric nodule     patient reports    Hypertension      Past Surgical History:   Procedure Laterality Date    CHOLECYSTECTOMY, LAPAROSCOPIC  2012    COLONOSCOPY      DILATION AND CURETTAGE OF UTERUS  1982    CO ARTHRP ACETBLR/PROX FEM

## 2023-08-30 NOTE — PROGRESS NOTES
Review of Systems   Constitutional:  Negative for chills, fatigue and fever. Eyes:  Negative for pain, redness and visual disturbance. Respiratory:  Negative for cough, shortness of breath and wheezing. Cardiovascular:  Negative for chest pain and leg swelling. Gastrointestinal:  Negative for abdominal pain, constipation, nausea and vomiting. Genitourinary:  Positive for flank pain (Right). Negative for difficulty urinating, dysuria, frequency, hematuria and urgency. Musculoskeletal:  Negative for back pain, joint swelling and myalgias. Skin:  Negative for rash and wound. Neurological:  Negative for dizziness, tremors and numbness. Hematological:  Does not bruise/bleed easily.

## 2023-08-31 DIAGNOSIS — R10.9 RIGHT FLANK PAIN: ICD-10-CM

## 2023-08-31 DIAGNOSIS — Z87.442 HISTORY OF KIDNEY STONES: ICD-10-CM

## 2023-09-01 LAB
MICROORGANISM SPEC CULT: NORMAL
SPECIMEN DESCRIPTION: NORMAL

## 2023-09-05 ENCOUNTER — HOSPITAL ENCOUNTER (OUTPATIENT)
Dept: CT IMAGING | Facility: CLINIC | Age: 73
Discharge: HOME | End: 2023-09-07
Payer: MEDICARE

## 2023-09-05 DIAGNOSIS — R10.9 RIGHT FLANK PAIN: ICD-10-CM

## 2023-09-05 DIAGNOSIS — Z87.442 HISTORY OF KIDNEY STONES: ICD-10-CM

## 2023-09-05 PROCEDURE — 74176 CT ABD & PELVIS W/O CONTRAST: CPT

## 2023-09-06 DIAGNOSIS — Z12.31 ENCOUNTER FOR SCREENING MAMMOGRAM FOR BREAST CANCER: ICD-10-CM

## 2023-09-13 ENCOUNTER — APPOINTMENT (OUTPATIENT)
Dept: ULTRASOUND IMAGING | Age: 73
End: 2023-09-13
Attending: INTERNAL MEDICINE
Payer: MEDICARE

## 2023-09-13 ENCOUNTER — ANESTHESIA EVENT (OUTPATIENT)
Dept: OPERATING ROOM | Age: 73
End: 2023-09-13
Payer: MEDICARE

## 2023-09-13 ENCOUNTER — ANESTHESIA (OUTPATIENT)
Dept: OPERATING ROOM | Age: 73
End: 2023-09-13
Payer: MEDICARE

## 2023-09-13 ENCOUNTER — HOSPITAL ENCOUNTER (OUTPATIENT)
Age: 73
Setting detail: OUTPATIENT SURGERY
Discharge: HOME | End: 2023-09-13
Attending: INTERNAL MEDICINE | Admitting: INTERNAL MEDICINE
Payer: MEDICARE

## 2023-09-13 VITALS
HEART RATE: 74 BPM | WEIGHT: 200 LBS | BODY MASS INDEX: 33.32 KG/M2 | HEIGHT: 65 IN | SYSTOLIC BLOOD PRESSURE: 145 MMHG | TEMPERATURE: 96.8 F | RESPIRATION RATE: 18 BRPM | OXYGEN SATURATION: 97 % | DIASTOLIC BLOOD PRESSURE: 89 MMHG

## 2023-09-13 DIAGNOSIS — D21.4 GIST (GASTROINTESTINAL STROMAL TUMOR), NON-MALIGNANT: ICD-10-CM

## 2023-09-13 DIAGNOSIS — C49.A0 STROMAL TUMOR OF DIGESTIVE SYSTEM (HCC): ICD-10-CM

## 2023-09-13 LAB — POTASSIUM BLD-SCNC: 4.3 MMOL/L (ref 3.5–4.5)

## 2023-09-13 PROCEDURE — 3700000001 HC ADD 15 MINUTES (ANESTHESIA): Performed by: INTERNAL MEDICINE

## 2023-09-13 PROCEDURE — 2580000003 HC RX 258: Performed by: ANESTHESIOLOGY

## 2023-09-13 PROCEDURE — 84132 ASSAY OF SERUM POTASSIUM: CPT

## 2023-09-13 PROCEDURE — 6360000002 HC RX W HCPCS: Performed by: NURSE ANESTHETIST, CERTIFIED REGISTERED

## 2023-09-13 PROCEDURE — 2709999900 HC NON-CHARGEABLE SUPPLY: Performed by: INTERNAL MEDICINE

## 2023-09-13 PROCEDURE — 43237 ENDOSCOPIC US EXAM ESOPH: CPT | Performed by: INTERNAL MEDICINE

## 2023-09-13 PROCEDURE — 88305 TISSUE EXAM BY PATHOLOGIST: CPT

## 2023-09-13 PROCEDURE — 3609012400 HC EGD TRANSORAL BIOPSY SINGLE/MULTIPLE: Performed by: INTERNAL MEDICINE

## 2023-09-13 PROCEDURE — 43239 EGD BIOPSY SINGLE/MULTIPLE: CPT | Performed by: INTERNAL MEDICINE

## 2023-09-13 PROCEDURE — 76975 GI ENDOSCOPIC ULTRASOUND: CPT | Performed by: INTERNAL MEDICINE

## 2023-09-13 PROCEDURE — 7100000010 HC PHASE II RECOVERY - FIRST 15 MIN: Performed by: INTERNAL MEDICINE

## 2023-09-13 PROCEDURE — 3700000000 HC ANESTHESIA ATTENDED CARE: Performed by: INTERNAL MEDICINE

## 2023-09-13 PROCEDURE — 2500000003 HC RX 250 WO HCPCS: Performed by: NURSE ANESTHETIST, CERTIFIED REGISTERED

## 2023-09-13 PROCEDURE — 7100000011 HC PHASE II RECOVERY - ADDTL 15 MIN: Performed by: INTERNAL MEDICINE

## 2023-09-13 PROCEDURE — 3609018500 HC EGD US SCOPE W/ADJACENT STRUCTURES: Performed by: INTERNAL MEDICINE

## 2023-09-13 RX ORDER — SODIUM CHLORIDE 0.9 % (FLUSH) 0.9 %
5-40 SYRINGE (ML) INJECTION PRN
Status: DISCONTINUED | OUTPATIENT
Start: 2023-09-13 | End: 2023-09-13 | Stop reason: HOSPADM

## 2023-09-13 RX ORDER — SODIUM CHLORIDE 9 MG/ML
INJECTION, SOLUTION INTRAVENOUS PRN
Status: DISCONTINUED | OUTPATIENT
Start: 2023-09-13 | End: 2023-09-13 | Stop reason: HOSPADM

## 2023-09-13 RX ORDER — SODIUM CHLORIDE 0.9 % (FLUSH) 0.9 %
5-40 SYRINGE (ML) INJECTION EVERY 12 HOURS SCHEDULED
Status: DISCONTINUED | OUTPATIENT
Start: 2023-09-13 | End: 2023-09-13 | Stop reason: HOSPADM

## 2023-09-13 RX ORDER — SODIUM CHLORIDE, SODIUM LACTATE, POTASSIUM CHLORIDE, CALCIUM CHLORIDE 600; 310; 30; 20 MG/100ML; MG/100ML; MG/100ML; MG/100ML
INJECTION, SOLUTION INTRAVENOUS CONTINUOUS
Status: DISCONTINUED | OUTPATIENT
Start: 2023-09-13 | End: 2023-09-13 | Stop reason: HOSPADM

## 2023-09-13 RX ORDER — FENTANYL CITRATE 50 UG/ML
INJECTION, SOLUTION INTRAMUSCULAR; INTRAVENOUS PRN
Status: DISCONTINUED | OUTPATIENT
Start: 2023-09-13 | End: 2023-09-13 | Stop reason: SDUPTHER

## 2023-09-13 RX ORDER — DIPHENHYDRAMINE HYDROCHLORIDE 50 MG/ML
12.5 INJECTION INTRAMUSCULAR; INTRAVENOUS
Status: DISCONTINUED | OUTPATIENT
Start: 2023-09-13 | End: 2023-09-13 | Stop reason: HOSPADM

## 2023-09-13 RX ORDER — LIDOCAINE HYDROCHLORIDE 10 MG/ML
INJECTION, SOLUTION EPIDURAL; INFILTRATION; INTRACAUDAL; PERINEURAL PRN
Status: DISCONTINUED | OUTPATIENT
Start: 2023-09-13 | End: 2023-09-13 | Stop reason: SDUPTHER

## 2023-09-13 RX ORDER — FENTANYL CITRATE 50 UG/ML
25 INJECTION, SOLUTION INTRAMUSCULAR; INTRAVENOUS EVERY 5 MIN PRN
Status: DISCONTINUED | OUTPATIENT
Start: 2023-09-13 | End: 2023-09-13 | Stop reason: HOSPADM

## 2023-09-13 RX ORDER — PROPOFOL 10 MG/ML
INJECTION, EMULSION INTRAVENOUS PRN
Status: DISCONTINUED | OUTPATIENT
Start: 2023-09-13 | End: 2023-09-13 | Stop reason: SDUPTHER

## 2023-09-13 RX ORDER — ONDANSETRON 2 MG/ML
4 INJECTION INTRAMUSCULAR; INTRAVENOUS
Status: DISCONTINUED | OUTPATIENT
Start: 2023-09-13 | End: 2023-09-13 | Stop reason: HOSPADM

## 2023-09-13 RX ADMIN — PROPOFOL 125 MCG/KG/MIN: 10 INJECTION, EMULSION INTRAVENOUS at 12:59

## 2023-09-13 RX ADMIN — LIDOCAINE HYDROCHLORIDE 50 MG: 10 INJECTION, SOLUTION EPIDURAL; INFILTRATION; INTRACAUDAL; PERINEURAL at 12:59

## 2023-09-13 RX ADMIN — FENTANYL CITRATE 25 MCG: 50 INJECTION, SOLUTION INTRAMUSCULAR; INTRAVENOUS at 12:59

## 2023-09-13 RX ADMIN — SODIUM CHLORIDE, POTASSIUM CHLORIDE, SODIUM LACTATE AND CALCIUM CHLORIDE: 600; 310; 30; 20 INJECTION, SOLUTION INTRAVENOUS at 11:00

## 2023-09-13 RX ADMIN — FENTANYL CITRATE 25 MCG: 50 INJECTION, SOLUTION INTRAMUSCULAR; INTRAVENOUS at 13:10

## 2023-09-13 ASSESSMENT — PAIN SCALES - GENERAL: PAINLEVEL_OUTOF10: 0

## 2023-09-13 ASSESSMENT — PAIN - FUNCTIONAL ASSESSMENT: PAIN_FUNCTIONAL_ASSESSMENT: NONE - DENIES PAIN

## 2023-09-13 ASSESSMENT — LIFESTYLE VARIABLES: SMOKING_STATUS: 0

## 2023-09-13 NOTE — OP NOTE
Operative Note      Patient: Dana Francis  YOB: 1950  MRN: [id number]    Date of Procedure: 9/13/2023    Pre-Op Diagnosis Codes:     * GIST (gastrointestinal stromal tumor), non-malignant [D21.4]    Post-Op Diagnosis: Grade a esophagitis, gastritis and gastric GIST. Procedure(s):  ENDOSCOPIC ULTRASOUND WITH PATHOLOGY  EGD BIOPSY    Surgeon(s):  Wellington Neumann MD    Assistant:   * No surgical staff found *    Anesthesia: Monitor Anesthesia Care    Estimated Blood Loss (mL): Minimal    Complications: None    Specimens:   ID Type Source Tests Collected by Time Destination   A : gastric bx Tissue Gastric SURGICAL PATHOLOGY Wellington Neumann MD 9/13/2023 1309    B : irregular Z-line bx Tissue Stomach SURGICAL PATHOLOGY Wellington Neumann MD 9/13/2023 1310        Implants:  * No implants in log *      Drains: * No LDAs found *      Description of Procedure:  Informed consent was obtained from the patient after explanation of the procedure including indications, description of the procedure,  benefits and possible risks and complications of the procedure, and alternatives. Questions were answered. The patient's history was reviewed and a directed physical examination was performed prior to the procedure. Patient was monitored throughout the procedure with pulse oximetry and periodic assessment of vital signs. Patient was sedated as noted above. With the patient in the left lateral decubitus position, the Olympus videoendoscope followed by endoechoendoscope was placed in the patient's mouth and under direct visualization passed into the esophagus. The scope was passed to the 2nd portion of the duodenum. The patient tolerated the procedure well and was taken to the recovery area in good condition. EGD Findings[de-identified]   Esophagus: Grade a esophagitis was seen at the GE junction. Biopsies were done. .   Stomach: Gastritis characterized by scattered erythema and hematin material was

## 2023-09-13 NOTE — DISCHARGE INSTRUCTIONS
No alcoholic beverages, no driving or operating machinery, no making important decisions for 24 hours. You may have a normal diet but should eat lightly day of surgery. Drink plenty of fluids. Urinate within 8 hours after surgery, if unable to urinate call your doctor     [facility]    POST-ENDOSCOPY INSTRUCTIONS    1. ACTIVITY   No driving, operating machinery, or making important decisions for 24 hours. Resume normal activity after 24 hours. You may return to work after 24 hours. 2. DIET        Resume your usual diet unless specified below. ***    3. MEDICATIONS    Resume your usual medications. Do not consume alcohol, tranquilizers, or sleeping medications for 24 hour unless advised by your physician. 4. PHYSICIAN FOLLOW-UP / INSTRUCTIONS    Please call the office/clinic in 10 days for biopsy results:      [  ] GI office:  [phone]          Follow up with your family physician as planned. 6. NORMAL CHANGES YOU MAY EXPERIENCE AFTER ENDOSCOPY:         EGD/EUS         Sore throat after EGD/EUS       A bloated feeling and belching from       air in stomach              You may feel fatigued for the next 24-48     hours due to the prep and sedation    7. CALL YOUR PHYSICIAN IF YOU EXPERIENCE ANY OF THE FOLLOWING      A. Passing blood rectally or vomiting blood (color may be red or black)      B. Severe abdominal pain or tenderness (that is not relieved by passing air)      C.   Fever, chills, or excessive sweating      D.  Persistent nausea or vomiting      E.  Redness or swelling at the IV site    If you have additional questions, PLEASE call your doctor or the [phone] [facility] ([phone])

## 2023-09-13 NOTE — ANESTHESIA POSTPROCEDURE EVALUATION
Department of Anesthesiology  Postprocedure Note    Patient: Rikki Gutierrez  MRN: [id number]  YOB: 1950  Date of evaluation: 9/13/2023      Procedure Summary     Date: 09/13/23 Room / Location: [address]    Anesthesia Start: 3310 Anesthesia Stop: 5072    Procedures:       ENDOSCOPIC ULTRASOUND        EGD BIOPSY Diagnosis:       GIST (gastrointestinal stromal tumor), non-malignant      (GIST (gastrointestinal stromal tumor), non-malignant [D21.4])    Surgeons: Aisha Buitrago MD Responsible Provider: Margarita Chan MD    Anesthesia Type: MAC ASA Status: 3          Anesthesia Type: No value filed.     Jarad Phase I:      Jarad Phase II: Jarad Score: 10      Anesthesia Post Evaluation    Patient location during evaluation: PACU  Patient participation: complete - patient participated  Level of consciousness: awake  Pain score: 0  Cardiovascular status: hemodynamically stable  Respiratory status: room air

## 2023-09-13 NOTE — PROGRESS NOTES
Discharge instructions discuss with patient and friend, verbalized understanding, all belonging given to patient. Discharged per wheelchair accompanoied by RN and friend Shanthi Quesada) in a stable condition.

## 2023-09-13 NOTE — H&P
History and Physical    Pt Name: Areli Rios  MRN: [id number]  YOB: 1950  Date of evaluation: 9/13/2023  Primary Care Physician: ZA Nino CNP    SUBJECTIVE:   History of Chief Complaint:    Areli Rios is a 72 y.o. female who is scheduled today for ENDOSCOPIC ULTRASOUND WITH PATHOLOGY. Patient is a limited historian but states she has a history of a gastric ulcer and had an endoscopy in 2021 but she cannot recall the results. She denies any gastrointestinal symptoms including nausea, vomiting, abdominal pain, dysphagia, diarrhea, constipation, rectal bleeding, weight fluctuation or appetite change. She has a history of GIST. Allergies  is allergic to allopurinol and morphine. Medications  Prior to Admission medications    Medication Sig Start Date End Date Taking?  Authorizing Provider   atorvastatin (LIPITOR) 10 MG tablet Take 1 tablet by mouth daily  Patient not taking: Reported on 9/13/2023 8/28/23   ZA Setrn NP   lisinopril-hydroCHLOROthiazide (PRINZIDE;ZESTORETIC) 20-12.5 MG per tablet take 1 tablet by mouth once daily 7/14/23   ZA Stern NP   HYDROcodone-acetaminophen ([address]) 5-325 MG per tablet take 1 tablet by mouth every 6 hours AS NEEDED FOR PAIN 12/27/22   Historical Provider, MD   Cyclobenzaprine HCl (FLEXERIL PO) Take 10 mg by mouth every 6 hours as needed    Carolina Martinez MD   [facility] Does not apply route 1/24/23   ZA Nino CNP   omeprazole (PRILOSEC) 40 MG delayed release capsule take 1 capsule by mouth every morning before breakfast  Patient taking differently: Indications: gets OTC 8/25/20   ZA Nino CNP     Past Medical History    has a past medical history of Arthritis, COVID-19, Gastric nodule, GERD (gastroesophageal reflux disease), GIST (gastrointestinal stromal tumor), non-malignant, History of stomach ulcers, Hypertension, Kidney stones, Pain, Under care of

## 2023-09-15 LAB — SURGICAL PATHOLOGY REPORT: NORMAL

## 2023-09-25 ENCOUNTER — OFFICE VISIT (OUTPATIENT)
Dept: UROLOGY | Age: 73
End: 2023-09-25
Payer: MEDICARE

## 2023-09-25 VITALS
TEMPERATURE: 97.3 F | BODY MASS INDEX: 33.49 KG/M2 | WEIGHT: 201 LBS | DIASTOLIC BLOOD PRESSURE: 84 MMHG | SYSTOLIC BLOOD PRESSURE: 134 MMHG | HEART RATE: 84 BPM | RESPIRATION RATE: 16 BRPM | OXYGEN SATURATION: 98 % | HEIGHT: 65 IN

## 2023-09-25 DIAGNOSIS — N20.0 KIDNEY STONE: Primary | ICD-10-CM

## 2023-09-25 DIAGNOSIS — R10.9 FLANK PAIN: ICD-10-CM

## 2023-09-25 DIAGNOSIS — R82.991 HYPOCITRATURIA: ICD-10-CM

## 2023-09-25 PROCEDURE — 3075F SYST BP GE 130 - 139MM HG: CPT | Performed by: UROLOGY

## 2023-09-25 PROCEDURE — G8427 DOCREV CUR MEDS BY ELIG CLIN: HCPCS | Performed by: UROLOGY

## 2023-09-25 PROCEDURE — G8417 CALC BMI ABV UP PARAM F/U: HCPCS | Performed by: UROLOGY

## 2023-09-25 PROCEDURE — 99213 OFFICE O/P EST LOW 20 MIN: CPT | Performed by: UROLOGY

## 2023-09-25 PROCEDURE — 3079F DIAST BP 80-89 MM HG: CPT | Performed by: UROLOGY

## 2023-09-25 PROCEDURE — 1036F TOBACCO NON-USER: CPT | Performed by: UROLOGY

## 2023-09-25 PROCEDURE — G8399 PT W/DXA RESULTS DOCUMENT: HCPCS | Performed by: UROLOGY

## 2023-09-25 PROCEDURE — 1090F PRES/ABSN URINE INCON ASSESS: CPT | Performed by: UROLOGY

## 2023-09-25 PROCEDURE — 1123F ACP DISCUSS/DSCN MKR DOCD: CPT | Performed by: UROLOGY

## 2023-09-25 PROCEDURE — 3017F COLORECTAL CA SCREEN DOC REV: CPT | Performed by: UROLOGY

## 2023-09-25 ASSESSMENT — ENCOUNTER SYMPTOMS
EYE REDNESS: 0
VOMITING: 0
ABDOMINAL PAIN: 0
SHORTNESS OF BREATH: 0
BACK PAIN: 0
EYE PAIN: 0
CONSTIPATION: 0
WHEEZING: 0
COUGH: 0
NAUSEA: 0

## 2023-09-25 NOTE — PROGRESS NOTES
[address]  [address] [facility] [address]  Dept: [address] [place] [address] Urology Office Note - Established    Patient:  Dick Monroy  YOB: 1950  Date: 9/25/2023    The patient is a 72 y.o. female whopresents today for evaluation of the following problems:   Chief Complaint   Patient presents with    Kindey stone     6 weeks w/Litholink and labs. Twyla Pinch \"Last 5-6 weeks I have been in chronic pain and passing kidney stones. \"       HPI  Is a very pleasant 80-year-old female who has a history of stones. We had done a 24-hour urine which shows mild hypocitraturia and also suboptimal urine volume. She has passed small stones continuously which she thinks are coming from her right side because she does have intermittent right flank pain. Interestingly, her CT scan from a few weeks ago only shows a few very small stones in the right kidney and a punctate stone in the left kidney. I did review the films myself and showed them to the patient. Summary of old records: N/A    Additional History: N/A    Procedures Today: N/A    Urinalysis today:  No results found for this visit on 09/25/23. Imaging Reviewed during this Office Visit: none  (results were independently reviewed by physician and radiology report verified)    AUA Symptom Score (9/25/2023): Last BUN and creatinine:  Lab Results   Component Value Date    BUN 21 08/15/2023    BUN 21 08/15/2023     Lab Results   Component Value Date    CREATININE 0.9 08/15/2023    CREATININE 0.9 08/15/2023       Additional Lab/Culture results: none    PAST MEDICAL, FAMILY AND SOCIAL HISTORY UPDATE:  Past Medical History:   Diagnosis Date    Arthritis     osteoarthritis    COVID-19 2020    tiredness X 7 days. No treatment or hospitalization.     Gastric nodule     patient reports    GERD (gastroesophageal reflux disease)     GIST

## 2023-09-25 NOTE — PROGRESS NOTES
Review of Systems   Constitutional:  Negative for chills, fatigue and fever. Eyes:  Negative for pain, redness and visual disturbance. Respiratory:  Negative for cough, shortness of breath and wheezing. Cardiovascular:  Negative for chest pain and leg swelling. Gastrointestinal:  Negative for abdominal pain, constipation, nausea and vomiting. Genitourinary:  Positive for flank pain (Left). Negative for difficulty urinating, dysuria, frequency, hematuria and urgency. Musculoskeletal:  Negative for back pain, joint swelling and myalgias. Skin:  Negative for rash and wound. Neurological:  Negative for dizziness, tremors and numbness. Hematological:  Does not bruise/bleed easily.

## 2023-12-04 ENCOUNTER — OFFICE VISIT (OUTPATIENT)
Dept: UROLOGY | Age: 73
End: 2023-12-04
Payer: MEDICARE

## 2023-12-04 VITALS
DIASTOLIC BLOOD PRESSURE: 84 MMHG | OXYGEN SATURATION: 97 % | HEIGHT: 65 IN | SYSTOLIC BLOOD PRESSURE: 136 MMHG | TEMPERATURE: 96.1 F | WEIGHT: 215.2 LBS | RESPIRATION RATE: 16 BRPM | BODY MASS INDEX: 35.85 KG/M2 | HEART RATE: 94 BPM

## 2023-12-04 DIAGNOSIS — N20.0 KIDNEY STONE: Primary | ICD-10-CM

## 2023-12-04 DIAGNOSIS — R82.991 HYPOCITRATURIA: ICD-10-CM

## 2023-12-04 PROCEDURE — 3075F SYST BP GE 130 - 139MM HG: CPT | Performed by: UROLOGY

## 2023-12-04 PROCEDURE — G8484 FLU IMMUNIZE NO ADMIN: HCPCS | Performed by: UROLOGY

## 2023-12-04 PROCEDURE — 99214 OFFICE O/P EST MOD 30 MIN: CPT | Performed by: UROLOGY

## 2023-12-04 PROCEDURE — 3079F DIAST BP 80-89 MM HG: CPT | Performed by: UROLOGY

## 2023-12-04 PROCEDURE — G8399 PT W/DXA RESULTS DOCUMENT: HCPCS | Performed by: UROLOGY

## 2023-12-04 PROCEDURE — 1123F ACP DISCUSS/DSCN MKR DOCD: CPT | Performed by: UROLOGY

## 2023-12-04 PROCEDURE — G8417 CALC BMI ABV UP PARAM F/U: HCPCS | Performed by: UROLOGY

## 2023-12-04 PROCEDURE — G8427 DOCREV CUR MEDS BY ELIG CLIN: HCPCS | Performed by: UROLOGY

## 2023-12-04 PROCEDURE — 3017F COLORECTAL CA SCREEN DOC REV: CPT | Performed by: UROLOGY

## 2023-12-04 PROCEDURE — 1090F PRES/ABSN URINE INCON ASSESS: CPT | Performed by: UROLOGY

## 2023-12-04 PROCEDURE — 1036F TOBACCO NON-USER: CPT | Performed by: UROLOGY

## 2023-12-04 RX ORDER — CYCLOBENZAPRINE HCL 10 MG
10 TABLET ORAL 2 TIMES DAILY PRN
COMMUNITY
Start: 2023-10-12

## 2023-12-04 RX ORDER — POTASSIUM CITRATE 10 MEQ/1
20 TABLET, EXTENDED RELEASE ORAL
Qty: 180 TABLET | Refills: 11 | Status: SHIPPED | OUTPATIENT
Start: 2023-12-04

## 2023-12-04 ASSESSMENT — ENCOUNTER SYMPTOMS
COUGH: 0
BACK PAIN: 0
ABDOMINAL PAIN: 0
WHEEZING: 0
CONSTIPATION: 0
EYE PAIN: 0
VOMITING: 0
SHORTNESS OF BREATH: 0
NAUSEA: 0
EYE REDNESS: 0

## 2023-12-04 NOTE — PROGRESS NOTES
[address]  [address] [facility] [address]  Dept: [address] [place] [address] Urology Office Note - Established    Patient:  Myah Urbina  YOB: 1950  Date: 12/4/2023    The patient is a 72 y.o. female whopresents today for evaluation of the following problems:   Chief Complaint   Patient presents with    Nephrolithiasis     3 months w/Litholink        HPI  This is a very pleasant 74-year-old female who has a history of stones. She had a low urine citrate and low urine volume on her first 24-hour urine. We had her supplement her diet with lemon juice and increase her hydration. Her urine volume is better but her citrate remains low. In fact it is lower than it was on her previous 24-hour urine. She does also continue to pass small stones. Summary of old records: N/A    Additional History: N/A    Procedures Today: N/A    Urinalysis today:  No results found for this visit on 12/04/23. Imaging Reviewed during this Office Visit: none  (results were independently reviewed by physician and radiology report verified)    AUA Symptom Score (12/4/2023): Last BUN and creatinine:  Lab Results   Component Value Date    BUN 21 08/15/2023    BUN 21 08/15/2023     Lab Results   Component Value Date    CREATININE 0.9 08/15/2023    CREATININE 0.9 08/15/2023       Additional Lab/Culture results: none    PAST MEDICAL, FAMILY AND SOCIAL HISTORY UPDATE:  Past Medical History:   Diagnosis Date    Arthritis     osteoarthritis    COVID-19 2020    tiredness X 7 days. No treatment or hospitalization.     Gastric nodule     patient reports    GERD (gastroesophageal reflux disease)     GIST (gastrointestinal stromal tumor), non-malignant     History of stomach ulcers     Hypertension     Kidney stones     Pain     R/L kidney/back    Under care of team     Urology Dr. Merlinda Bodo MD/ [place]/ last seen

## 2024-01-08 DIAGNOSIS — I10 ESSENTIAL HYPERTENSION: ICD-10-CM

## 2024-01-08 RX ORDER — LISINOPRIL AND HYDROCHLOROTHIAZIDE 20; 12.5 MG/1; MG/1
TABLET ORAL
Qty: 90 TABLET | Refills: 0 | Status: SHIPPED | OUTPATIENT
Start: 2024-01-08
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
## 2024-01-09 ENCOUNTER — TELEPHONE (OUTPATIENT)
Dept: FAMILY MEDICINE CLINIC | Age: 74
End: 2024-01-09

## 2024-01-09 NOTE — TELEPHONE ENCOUNTER
Pt called to schedule an over due appt, also stated that she started Lipitor about a month and a half ago, she took it for about 3 weeks, legs from the knee down were swollen and painful, pt did stop med after about 3 weeks, has not taken med in about 3 weeks, pain has eased up and still some swelling     Pt does not want to do an cholesterol med

## 2024-01-18 ENCOUNTER — TELEMEDICINE (OUTPATIENT)
Dept: FAMILY MEDICINE CLINIC | Age: 74
End: 2024-01-18

## 2024-01-18 ENCOUNTER — HOSPITAL ENCOUNTER (OUTPATIENT)
Age: 74
Setting detail: SPECIMEN
Discharge: HOME | End: 2024-01-18

## 2024-01-18 DIAGNOSIS — M79.89 LEG SWELLING: ICD-10-CM

## 2024-01-18 DIAGNOSIS — E55.9 VITAMIN D DEFICIENCY: Primary | ICD-10-CM

## 2024-01-18 DIAGNOSIS — R35.0 URINARY FREQUENCY: ICD-10-CM

## 2024-01-18 DIAGNOSIS — E83.52 HYPERCALCEMIA: ICD-10-CM

## 2024-01-18 DIAGNOSIS — R35.0 URINARY FREQUENCY: Primary | ICD-10-CM

## 2024-01-18 DIAGNOSIS — Z79.899 MEDICATION MANAGEMENT: ICD-10-CM

## 2024-01-18 DIAGNOSIS — E66.01 SEVERE OBESITY (BMI 35.0-39.9) WITH COMORBIDITY (HCC): ICD-10-CM

## 2024-01-18 DIAGNOSIS — Z13.29 SCREENING FOR THYROID DISORDER: ICD-10-CM

## 2024-01-18 DIAGNOSIS — C49.A0 STROMAL TUMOR OF DIGESTIVE SYSTEM (HCC): ICD-10-CM

## 2024-01-18 LAB
25(OH)D3 SERPL-MCNC: 22 NG/ML (ref 30–100)
ALBUMIN SERPL-MCNC: 4.4 G/DL (ref 3.5–5.2)
ALBUMIN/GLOB SERPL: 2 {RATIO} (ref 1–2.5)
ALP SERPL-CCNC: 99 U/L (ref 35–104)
ALT SERPL-CCNC: 41 U/L (ref 10–35)
ANION GAP SERPL CALCULATED.3IONS-SCNC: 9 MMOL/L (ref 9–16)
AST SERPL-CCNC: 38 U/L (ref 10–35)
BACTERIA URNS QL MICRO: ABNORMAL
BILIRUB DIRECT SERPL-MCNC: <0.2 MG/DL (ref 0–0.3)
BILIRUB INDIRECT SERPL-MCNC: 0.4 MG/DL (ref 0–1)
BILIRUB SERPL-MCNC: 0.5 MG/DL (ref 0–1.2)
BILIRUB UR QL STRIP: NEGATIVE
BUN SERPL-MCNC: 20 MG/DL (ref 8–23)
CALCIUM SERPL-MCNC: 10.3 MG/DL (ref 8.6–10.4)
CASTS #/AREA URNS LPF: ABNORMAL /LPF (ref 0–8)
CHLORIDE SERPL-SCNC: 99 MMOL/L (ref 98–107)
CLARITY UR: ABNORMAL
CO2 SERPL-SCNC: 28 MMOL/L (ref 20–31)
COLOR UR: YELLOW
CREAT SERPL-MCNC: 1 MG/DL (ref 0.5–0.9)
EPI CELLS #/AREA URNS HPF: ABNORMAL /HPF (ref 0–5)
ERYTHROCYTE [DISTWIDTH] IN BLOOD BY AUTOMATED COUNT: 14.6 % (ref 11.8–14.4)
GFR SERPL CREATININE-BSD FRML MDRD: 58 ML/MIN/1.73M2
GLOBULIN SER CALC-MCNC: 2.8 G/DL
GLUCOSE SERPL-MCNC: 118 MG/DL (ref 74–99)
GLUCOSE UR STRIP-MCNC: NEGATIVE MG/DL
HCT VFR BLD AUTO: 42.8 % (ref 36.3–47.1)
HGB BLD-MCNC: 13.9 G/DL (ref 11.9–15.1)
HGB UR QL STRIP.AUTO: ABNORMAL
KETONES UR STRIP-MCNC: NEGATIVE MG/DL
LEUKOCYTE ESTERASE UR QL STRIP: ABNORMAL
MAGNESIUM SERPL-MCNC: 2.1 MG/DL (ref 1.6–2.4)
MCH RBC QN AUTO: 29.4 PG (ref 25.2–33.5)
MCHC RBC AUTO-ENTMCNC: 32.5 G/DL (ref 28.4–34.8)
MCV RBC AUTO: 90.5 FL (ref 82.6–102.9)
NITRITE UR QL STRIP: POSITIVE
NRBC BLD-RTO: 0 PER 100 WBC
PH UR STRIP: 7.5 [PH] (ref 5–8)
PLATELET # BLD AUTO: 219 K/UL (ref 138–453)
PMV BLD AUTO: 11.5 FL (ref 8.1–13.5)
POTASSIUM SERPL-SCNC: 4.8 MMOL/L (ref 3.7–5.3)
PROT SERPL-MCNC: 7.2 G/DL (ref 6.6–8.7)
PROT UR STRIP-MCNC: ABNORMAL MG/DL
RBC # BLD AUTO: 4.73 M/UL (ref 3.95–5.11)
RBC #/AREA URNS HPF: ABNORMAL /HPF (ref 0–4)
SODIUM SERPL-SCNC: 136 MMOL/L (ref 136–145)
SP GR UR STRIP: 1.01 (ref 1–1.03)
TSH SERPL DL<=0.05 MIU/L-ACNC: 1.37 UIU/ML (ref 0.27–4.2)
UROBILINOGEN UR STRIP-ACNC: NORMAL EU/DL (ref 0–1)
WBC #/AREA URNS HPF: ABNORMAL /HPF (ref 0–5)
WBC OTHER # BLD: 9.4 K/UL (ref 3.5–11.3)

## 2024-01-18 RX ORDER — NITROFURANTOIN 25; 75 MG/1; MG/1
100 CAPSULE ORAL 2 TIMES DAILY
Qty: 14 CAPSULE | Refills: 0 | Status: SHIPPED | OUTPATIENT
Start: 2024-01-18 | End: 2024-01-25

## 2024-01-18 RX ORDER — ERGOCALCIFEROL 1.25 MG/1
50000 CAPSULE ORAL WEEKLY
Qty: 8 CAPSULE | Refills: 0 | Status: SHIPPED | OUTPATIENT
Start: 2024-01-18

## 2024-01-18 ASSESSMENT — PATIENT HEALTH QUESTIONNAIRE - PHQ9
SUM OF ALL RESPONSES TO PHQ QUESTIONS 1-9: 0
SUM OF ALL RESPONSES TO PHQ QUESTIONS 1-9: 0
1. LITTLE INTEREST OR PLEASURE IN DOING THINGS: 0
2. FEELING DOWN, DEPRESSED OR HOPELESS: 0
SUM OF ALL RESPONSES TO PHQ QUESTIONS 1-9: 0
SUM OF ALL RESPONSES TO PHQ9 QUESTIONS 1 & 2: 0
SUM OF ALL RESPONSES TO PHQ QUESTIONS 1-9: 0

## 2024-01-18 ASSESSMENT — ENCOUNTER SYMPTOMS
VOMITING: 0
COUGH: 0
SHORTNESS OF BREATH: 0
NAUSEA: 0

## 2024-01-18 NOTE — PROGRESS NOTES
Xiomara Monroy, was evaluated through a synchronous (real-time) audio-video encounter. The patient (or guardian if applicable) is aware that this is a billable service, which includes applicable co-pays. This Virtual Visit was conducted with patient's (and/or legal guardian's) consent. Patient identification was verified, and a caregiver was present when appropriate.   The patient was located at Home: [address] [place] [address]  Provider was located at Home (Appt Dept State): [place] Monroy (:  1950) is a Established patient, presenting virtually for evaluation of the following:    Assessment & Plan   Below is the assessment and plan developed based on review of pertinent history, physical exam, labs, studies, and medications.  1. Urinary frequency  -     nitrofurantoin, macrocrystal-monohydrate, (MACROBID) 100 MG capsule; Take 1 capsule by mouth 2 times daily for 7 days, Disp-14 capsule, R-0Normal  -     Urinalysis; Future  2. Stromal tumor of digestive system (HCC)  3. Severe obesity (BMI 35.0-39.9) with comorbidity (HCC)  4. Screening for thyroid disorder  -     TSH; Future  5. Leg swelling  -     Hepatic Function Panel; Future  6. Medication management  -     CBC; Future  -     Basic Metabolic Panel; Future  Pt will stop in for a urinalysis and lab work    No follow-ups on file.       Subjective   Urinary Frequency   This is a new problem. The current episode started in the past 7 days. The problem has been waxing and waning. The quality of the pain is described as burning. There has been no fever. There is No history of pyelonephritis. Associated symptoms include frequency, hesitancy and urgency. Pertinent negatives include no chills, discharge, flank pain, hematuria, nausea or vomiting. She has tried increased fluids for the symptoms. The treatment provided mild relief. Her past medical history is significant for kidney stones.     Did not tolerate statin. States when

## 2024-02-15 ENCOUNTER — TELEPHONE (OUTPATIENT)
Dept: FAMILY MEDICINE CLINIC | Age: 74
End: 2024-02-15

## 2024-02-15 RX ORDER — SULFAMETHOXAZOLE AND TRIMETHOPRIM 800; 160 MG/1; MG/1
1 TABLET ORAL 2 TIMES DAILY
Qty: 10 TABLET | Refills: 0 | Status: SHIPPED | OUTPATIENT
Start: 2024-02-15 | End: 2024-02-20

## 2024-02-15 NOTE — TELEPHONE ENCOUNTER
Pt states she was seen 01/18/2024 for UTI and she took the antibiotic. Pt states it never fully went away and now it is starting to get really bad again. Pt states \"I know I should of called sooner.\" Pt would like another antibiotic sent to her pharmacy.

## 2024-02-23 ASSESSMENT — PATIENT HEALTH QUESTIONNAIRE - PHQ9
SUM OF ALL RESPONSES TO PHQ9 QUESTIONS 1 & 2: 0
SUM OF ALL RESPONSES TO PHQ QUESTIONS 1-9: 0
2. FEELING DOWN, DEPRESSED OR HOPELESS: NOT AT ALL
1. LITTLE INTEREST OR PLEASURE IN DOING THINGS: NOT AT ALL
1. LITTLE INTEREST OR PLEASURE IN DOING THINGS: 0
SUM OF ALL RESPONSES TO PHQ QUESTIONS 1-9: 0
SUM OF ALL RESPONSES TO PHQ9 QUESTIONS 1 & 2: 0
2. FEELING DOWN, DEPRESSED OR HOPELESS: 0

## 2024-02-26 ENCOUNTER — OFFICE VISIT (OUTPATIENT)
Dept: FAMILY MEDICINE CLINIC | Age: 74
End: 2024-02-26
Payer: MEDICARE

## 2024-02-26 VITALS
WEIGHT: 216 LBS | HEIGHT: 65 IN | BODY MASS INDEX: 35.99 KG/M2 | RESPIRATION RATE: 20 BRPM | OXYGEN SATURATION: 98 % | HEART RATE: 83 BPM | SYSTOLIC BLOOD PRESSURE: 134 MMHG | DIASTOLIC BLOOD PRESSURE: 82 MMHG

## 2024-02-26 DIAGNOSIS — M25.562 PAIN AND SWELLING OF LEFT KNEE: ICD-10-CM

## 2024-02-26 DIAGNOSIS — M25.462 PAIN AND SWELLING OF LEFT KNEE: ICD-10-CM

## 2024-02-26 DIAGNOSIS — Z12.31 ENCOUNTER FOR SCREENING MAMMOGRAM FOR BREAST CANCER: Primary | ICD-10-CM

## 2024-02-26 DIAGNOSIS — R79.89 ELEVATED LFTS: ICD-10-CM

## 2024-02-26 PROCEDURE — 1090F PRES/ABSN URINE INCON ASSESS: CPT | Performed by: NURSE PRACTITIONER

## 2024-02-26 PROCEDURE — G8417 CALC BMI ABV UP PARAM F/U: HCPCS | Performed by: NURSE PRACTITIONER

## 2024-02-26 PROCEDURE — 1123F ACP DISCUSS/DSCN MKR DOCD: CPT | Performed by: NURSE PRACTITIONER

## 2024-02-26 PROCEDURE — 1036F TOBACCO NON-USER: CPT | Performed by: NURSE PRACTITIONER

## 2024-02-26 PROCEDURE — G8399 PT W/DXA RESULTS DOCUMENT: HCPCS | Performed by: NURSE PRACTITIONER

## 2024-02-26 PROCEDURE — 99214 OFFICE O/P EST MOD 30 MIN: CPT | Performed by: NURSE PRACTITIONER

## 2024-02-26 PROCEDURE — G8427 DOCREV CUR MEDS BY ELIG CLIN: HCPCS | Performed by: NURSE PRACTITIONER

## 2024-02-26 PROCEDURE — 3017F COLORECTAL CA SCREEN DOC REV: CPT | Performed by: NURSE PRACTITIONER

## 2024-02-26 PROCEDURE — G8484 FLU IMMUNIZE NO ADMIN: HCPCS | Performed by: NURSE PRACTITIONER

## 2024-02-26 PROCEDURE — 3079F DIAST BP 80-89 MM HG: CPT | Performed by: NURSE PRACTITIONER

## 2024-02-26 PROCEDURE — 3075F SYST BP GE 130 - 139MM HG: CPT | Performed by: NURSE PRACTITIONER

## 2024-02-26 RX ORDER — VALACYCLOVIR HYDROCHLORIDE 1 G/1
TABLET, FILM COATED ORAL
COMMUNITY
Start: 2024-01-28

## 2024-02-26 SDOH — ECONOMIC STABILITY: FOOD INSECURITY: WITHIN THE PAST 12 MONTHS, YOU WORRIED THAT YOUR FOOD WOULD RUN OUT BEFORE YOU GOT MONEY TO BUY MORE.: NEVER TRUE

## 2024-02-26 SDOH — ECONOMIC STABILITY: FOOD INSECURITY: WITHIN THE PAST 12 MONTHS, THE FOOD YOU BOUGHT JUST DIDN'T LAST AND YOU DIDN'T HAVE MONEY TO GET MORE.: NEVER TRUE

## 2024-02-26 SDOH — ECONOMIC STABILITY: INCOME INSECURITY: HOW HARD IS IT FOR YOU TO PAY FOR THE VERY BASICS LIKE FOOD, HOUSING, MEDICAL CARE, AND HEATING?: NOT HARD AT ALL

## 2024-02-26 ASSESSMENT — ENCOUNTER SYMPTOMS
SHORTNESS OF BREATH: 0
COUGH: 0

## 2024-02-26 NOTE — PROGRESS NOTES
present.   Skin:     General: Skin is warm and dry.   Neurological:      Mental Status: She is alert and oriented to person, place, and time.       An electronic signature was used to authenticate this note.    --ZA MARTI - CNP

## 2024-02-27 ENCOUNTER — HOSPITAL ENCOUNTER (OUTPATIENT)
Facility: CLINIC | Age: 74
Discharge: HOME | End: 2024-02-29
Payer: MEDICARE

## 2024-02-27 ENCOUNTER — HOSPITAL ENCOUNTER (OUTPATIENT)
Age: 74
Setting detail: SPECIMEN
Discharge: HOME | End: 2024-02-27

## 2024-02-27 ENCOUNTER — HOSPITAL ENCOUNTER (OUTPATIENT)
Dept: GENERAL RADIOLOGY | Facility: CLINIC | Age: 74
Discharge: HOME | End: 2024-02-29
Payer: MEDICARE

## 2024-02-27 DIAGNOSIS — M25.462 PAIN AND SWELLING OF LEFT KNEE: ICD-10-CM

## 2024-02-27 DIAGNOSIS — R79.89 ELEVATED LFTS: ICD-10-CM

## 2024-02-27 DIAGNOSIS — M25.562 PAIN AND SWELLING OF LEFT KNEE: ICD-10-CM

## 2024-02-27 LAB
ALBUMIN SERPL-MCNC: 4.4 G/DL (ref 3.5–5.2)
ALBUMIN/GLOB SERPL: 2 {RATIO} (ref 1–2.5)
ALP SERPL-CCNC: 79 U/L (ref 35–104)
ALT SERPL-CCNC: 33 U/L (ref 10–35)
AST SERPL-CCNC: 29 U/L (ref 10–35)
BILIRUB DIRECT SERPL-MCNC: <0.2 MG/DL (ref 0–0.3)
BILIRUB INDIRECT SERPL-MCNC: 0.4 MG/DL (ref 0–1)
BILIRUB SERPL-MCNC: 0.3 MG/DL (ref 0–1.2)
GLOBULIN SER CALC-MCNC: 2.6 G/DL
PROT SERPL-MCNC: 7 G/DL (ref 6.6–8.7)

## 2024-02-27 PROCEDURE — 73562 X-RAY EXAM OF KNEE 3: CPT

## 2024-02-29 RX ORDER — FUROSEMIDE 20 MG/1
20 TABLET ORAL DAILY
Qty: 3 TABLET | Refills: 0 | Status: SHIPPED | OUTPATIENT
Start: 2024-02-29 | End: 2024-03-03

## 2024-03-04 NOTE — TELEPHONE ENCOUNTER
Last visit: 02/26/2024  Last Med refill: 02/29/2024  Does patient have enough medication for 72 hours: YES    Next Visit Date:  Future Appointments   Date Time Provider Department Center   6/3/2024  8:20 AM Sander Anders MD .  [facility]       Health Maintenance   Topic Date Due    Annual Wellness Visit (Medicare)  01/25/2024    A1C test (Diabetic or Prediabetic)  08/15/2024    GFR test (Diabetes, CKD 3-4, OR last GFR 15-59)  01/18/2025    Depression Screen  02/23/2025    Breast cancer screen  03/24/2025    DTaP/Tdap/Td vaccine (2 - Td or Tdap) 08/21/2027    Lipids  08/15/2028    Colorectal Cancer Screen  02/28/2029    DEXA (modify frequency per FRAX score)  Completed    Flu vaccine  Completed    Shingles vaccine  Completed    Pneumococcal 65+ years Vaccine  Completed    COVID-19 Vaccine  Completed    Respiratory Syncytial Virus (RSV) Pregnant or age 60 yrs+  Completed    Hepatitis C screen  Completed    Hepatitis A vaccine  Aged Out    Hepatitis B vaccine  Aged Out    Hib vaccine  Aged Out    Polio vaccine  Aged Out    Meningococcal (ACWY) vaccine  Aged Out       Hemoglobin A1C (%)   Date Value   08/15/2023 5.7   04/14/2022 6.0   10/14/2020 5.9             ( goal A1C is < 7)   No components found for: \"LABMICR\"  LDL Cholesterol (mg/dL)   Date Value   08/15/2023 82   04/14/2022 109       (goal LDL is <100)   AST (U/L)   Date Value   02/27/2024 29     ALT (U/L)   Date Value   02/27/2024 33     BUN (mg/dL)   Date Value   01/18/2024 20     BP Readings from Last 3 Encounters:   02/26/24 134/82   12/04/23 136/84   09/25/23 134/84          (goal 120/80)    All Future Testing planned in [facility]  Lab Frequency Next Occurrence   LITHOLINK Once 07/31/2023   TSH With Reflex Ft4 Once 08/23/2023   Lipid Panel Once 02/28/2024   LITHOLINK Once 09/25/2023   LITHOLINK Once 12/04/2023   XR ABDOMEN (KUB) (SINGLE AP VIEW) Once 03/04/2024   ANALY DIGITAL SCREEN W OR WO CAD BILATERAL Once 02/26/2024               Patient Active

## 2024-03-05 RX ORDER — FUROSEMIDE 20 MG/1
TABLET ORAL
Qty: 3 TABLET | Refills: 0 | Status: SHIPPED | OUTPATIENT
Start: 2024-03-05

## 2024-03-10 DIAGNOSIS — E55.9 VITAMIN D DEFICIENCY: ICD-10-CM

## 2024-03-12 RX ORDER — ERGOCALCIFEROL 1.25 MG/1
50000 CAPSULE ORAL WEEKLY
Qty: 8 CAPSULE | Refills: 2 | Status: SHIPPED | OUTPATIENT
Start: 2024-03-12

## 2024-03-26 ENCOUNTER — OFFICE VISIT (OUTPATIENT)
Dept: FAMILY MEDICINE CLINIC | Age: 74
End: 2024-03-26

## 2024-03-26 VITALS
BODY MASS INDEX: 35.78 KG/M2 | SYSTOLIC BLOOD PRESSURE: 120 MMHG | OXYGEN SATURATION: 98 % | DIASTOLIC BLOOD PRESSURE: 70 MMHG | HEART RATE: 89 BPM | WEIGHT: 215 LBS

## 2024-03-26 DIAGNOSIS — I10 ESSENTIAL HYPERTENSION: ICD-10-CM

## 2024-03-26 DIAGNOSIS — M79.89 LEG SWELLING: ICD-10-CM

## 2024-03-26 DIAGNOSIS — M25.462 PAIN AND SWELLING OF LEFT KNEE: Primary | ICD-10-CM

## 2024-03-26 DIAGNOSIS — M25.562 PAIN AND SWELLING OF LEFT KNEE: Primary | ICD-10-CM

## 2024-03-26 DIAGNOSIS — R60.0 LOCALIZED EDEMA: ICD-10-CM

## 2024-03-26 RX ORDER — FUROSEMIDE 20 MG/1
20 TABLET ORAL 2 TIMES DAILY
COMMUNITY

## 2024-03-26 ASSESSMENT — ENCOUNTER SYMPTOMS
COUGH: 0
SHORTNESS OF BREATH: 0

## 2024-03-26 NOTE — PROGRESS NOTES
Xiomara Monroy (:  1950) is a 74 y.o. female,Established patient, here for evaluation of the following chief complaint(s):  Joint Swelling         ASSESSMENT/PLAN:  1. Pain and swelling of left knee  -     Hernesto Olivarez, , Orthopaedic Surgery, [facility]  2. Leg swelling  3. Essential hypertension  -     Echo (TTE) complete (PRN contrast/bubble/strain/3D); Future  4. Localized edema  -     Echo (TTE) complete (PRN contrast/bubble/strain/3D); Future      No follow-ups on file.         Subjective   SUBJECTIVE/OBJECTIVE:  HPI  Went to er for leg swelling. Vascular scan, bnp all wnl.  She does feel the lasix helps.  She states this has all started after taking lipitor.  Compression socks help.         Review of Systems   Constitutional:  Negative for chills and fever.   Respiratory:  Negative for cough and shortness of breath.    Cardiovascular:  Negative for chest pain, palpitations and leg swelling.     Objective   Vitals:    24 1400   BP: 120/70   Pulse: 89   SpO2: 98%     Wt Readings from Last 3 Encounters:   24 97.5 kg (215 lb)   24 98 kg (216 lb)   23 97.6 kg (215 lb 3.2 oz)       Physical Exam  Constitutional:       Appearance: She is well-developed.   HENT:      Right Ear: External ear normal.      Left Ear: External ear normal.      Nose: Nose normal.   Cardiovascular:      Rate and Rhythm: Normal rate and regular rhythm.      Heart sounds: Normal heart sounds, S1 normal and S2 normal.   Pulmonary:      Effort: Pulmonary effort is normal. No respiratory distress.      Breath sounds: Normal breath sounds.   Musculoskeletal:         General: No deformity. Normal range of motion.      Cervical back: Full passive range of motion without pain and normal range of motion.   Skin:     General: Skin is warm and dry.   Neurological:      Mental Status: She is alert and oriented to person, place, and time.            An electronic signature was used to authenticate this

## 2024-03-26 NOTE — PROGRESS NOTES
Patient is present complaining of left knee swelling  Patient went to [facility] ED for this on 3/18 and was given Lasix  States she has been wearing a compression stocking  States the swelling has been improving

## 2024-03-28 ENCOUNTER — OFFICE VISIT (OUTPATIENT)
Dept: ORTHOPEDIC SURGERY | Age: 74
End: 2024-03-28
Payer: MEDICARE

## 2024-03-28 VITALS — BODY MASS INDEX: 35.65 KG/M2 | WEIGHT: 214 LBS | HEIGHT: 65 IN

## 2024-03-28 DIAGNOSIS — M17.0 ARTHRITIS OF BOTH KNEES: ICD-10-CM

## 2024-03-28 DIAGNOSIS — M25.561 ACUTE PAIN OF BOTH KNEES: Primary | ICD-10-CM

## 2024-03-28 DIAGNOSIS — M25.561 PAIN IN BOTH KNEES, UNSPECIFIED CHRONICITY: Primary | ICD-10-CM

## 2024-03-28 DIAGNOSIS — M25.562 ACUTE PAIN OF BOTH KNEES: Primary | ICD-10-CM

## 2024-03-28 DIAGNOSIS — M25.562 PAIN IN BOTH KNEES, UNSPECIFIED CHRONICITY: Primary | ICD-10-CM

## 2024-03-28 DIAGNOSIS — M25.562 LEFT KNEE PAIN, UNSPECIFIED CHRONICITY: Primary | ICD-10-CM

## 2024-03-28 PROCEDURE — 1090F PRES/ABSN URINE INCON ASSESS: CPT

## 2024-03-28 PROCEDURE — 3017F COLORECTAL CA SCREEN DOC REV: CPT

## 2024-03-28 PROCEDURE — G8399 PT W/DXA RESULTS DOCUMENT: HCPCS

## 2024-03-28 PROCEDURE — G8417 CALC BMI ABV UP PARAM F/U: HCPCS

## 2024-03-28 PROCEDURE — 1036F TOBACCO NON-USER: CPT

## 2024-03-28 PROCEDURE — 20610 DRAIN/INJ JOINT/BURSA W/O US: CPT

## 2024-03-28 PROCEDURE — 99203 OFFICE O/P NEW LOW 30 MIN: CPT

## 2024-03-28 PROCEDURE — G8427 DOCREV CUR MEDS BY ELIG CLIN: HCPCS

## 2024-03-28 PROCEDURE — G8484 FLU IMMUNIZE NO ADMIN: HCPCS

## 2024-03-28 PROCEDURE — 1123F ACP DISCUSS/DSCN MKR DOCD: CPT

## 2024-03-28 RX ORDER — METHYLPREDNISOLONE ACETATE 80 MG/ML
80 INJECTION, SUSPENSION INTRA-ARTICULAR; INTRALESIONAL; INTRAMUSCULAR; SOFT TISSUE ONCE
Status: COMPLETED | OUTPATIENT
Start: 2024-03-28 | End: 2024-03-28

## 2024-03-28 RX ORDER — BUPIVACAINE HYDROCHLORIDE 2.5 MG/ML
5 INJECTION, SOLUTION INFILTRATION; PERINEURAL ONCE
Status: COMPLETED | OUTPATIENT
Start: 2024-03-28 | End: 2024-03-28

## 2024-03-28 RX ADMIN — BUPIVACAINE HYDROCHLORIDE 12.5 MG: 2.5 INJECTION, SOLUTION INFILTRATION; PERINEURAL at 09:35

## 2024-03-28 RX ADMIN — METHYLPREDNISOLONE ACETATE 80 MG: 80 INJECTION, SUSPENSION INTRA-ARTICULAR; INTRALESIONAL; INTRAMUSCULAR; SOFT TISSUE at 09:35

## 2024-03-28 ASSESSMENT — ENCOUNTER SYMPTOMS
COLOR CHANGE: 1
SHORTNESS OF BREATH: 0

## 2024-03-28 NOTE — PROGRESS NOTES
extremity edema. There is no skin warmth, skin lesions, or signs of infection appreciated.  There is tenderness over the medial joint and lateral joint lines with palpation.  There is a moderate knee effusion of the left knee.  Range of motion of the Right knee is 0-120 and range of motion of the left knee is 5-90.  No instability of the knee is appreciated at 0 and 30° of flexion.  There is a negative anterior drawer and Lachman's test.  There is no increased pain with valgus Jamal's testing.  No calf tenderness is noted bilaterally.  There is a negative hip log roll and Stinchfield test on the Bilateral.  Motor, sensory, vascular examination to the Bilateral lower extremity is intact.  There is restricted range of motion of the Bilateral ankles.     Radiology:    XR knee bilateral (4 or more views)    Please refer to radiology read from 3/28/24 for most recent imaging result.      Procedure:    After informed consent was obtained verbally, using a superior lateral approach to the  Left  knee, the skin was locally prepped with Betadine and locally anesthetized with 3cc 1% lidocaine.  The knee was then aspirated of  40 mL of straw color fluid and then injected with a mixture of 0.25% Marcaine plain and 80 mg of Depo-Medrol. A band-aid and ace wrap bandage was applied.  Patient tolerated the procedure well without postinjection complications.     ASSESSMENT:     1. Acute pain of both knees       PLAN:     The patient presents today for evaluation of her bilateral knee swelling and pain.  She has had right knee pain in the past, but notes that she noticed an increase in pain and swelling throughout both knees over the past 3 months.  She did start Lipitor 3 and half months ago and had a adverse reaction to this.  She has since stopped the Lipitor and has been put on Lasix for bilateral lower extremity swelling.  X-rays of the bilateral knees were taken today in the office which demonstrate severe degenerative

## 2024-03-28 NOTE — PATIENT INSTRUCTIONS
INJECTION CARE    The injection site should never get red, hot, or swollen and if it does the patient will contact our office right away. With a cortisone steroid injection, the patient may experience a increase in soreness the first 24-48 hours due to a cortisone flair and can take anti-inflammatories for a short period of time to reduce that soreness. With a lubrication injection, on rare occasion the patient may develop swelling and pain if having a reaction to the medication. If this happens, try an anti-inflammatory medication and ice as needed. If pain and swelling continues, call the office for further instructions. The patient should not submerge the injection site in water for a minimum of 24 hours to avoid infection. This means no lakes, pools, ponds, or hot tubs for 24 hours. If the patient is diabetic the injection may increase their blood sugar for up to one week. The patient can do this cortisone injection once every 3 months as needed and lubrication injections every 6 months.

## 2024-04-02 ENCOUNTER — HOSPITAL ENCOUNTER (OUTPATIENT)
Age: 74
Discharge: HOME | End: 2024-04-04
Payer: MEDICARE

## 2024-04-02 DIAGNOSIS — I10 ESSENTIAL HYPERTENSION: ICD-10-CM

## 2024-04-02 DIAGNOSIS — R60.0 LOCALIZED EDEMA: ICD-10-CM

## 2024-04-02 LAB
ECHO AO ROOT DIAM: 3.1 CM
ECHO AV AREA PEAK VELOCITY: 2.2 CM2
ECHO AV AREA VTI: 2.2 CM2
ECHO AV MEAN GRADIENT: 3 MMHG
ECHO AV MEAN VELOCITY: 0.8 M/S
ECHO AV PEAK GRADIENT: 6 MMHG
ECHO AV PEAK VELOCITY: 1.2 M/S
ECHO AV VELOCITY RATIO: 0.83
ECHO AV VTI: 27.9 CM
ECHO EST RA PRESSURE: 3 MMHG
ECHO LA DIAMETER: 3.9 CM
ECHO LA TO AORTIC ROOT RATIO: 1.26
ECHO LV E' LATERAL VELOCITY: 6 CM/S
ECHO LV E' SEPTAL VELOCITY: 6 CM/S
ECHO LV FRACTIONAL SHORTENING: 28 % (ref 28–44)
ECHO LV INTERNAL DIMENSION DIASTOLIC: 4.6 CM (ref 3.9–5.3)
ECHO LV INTERNAL DIMENSION SYSTOLIC: 3.3 CM
ECHO LV IVSD: 1 CM (ref 0.6–0.9)
ECHO LV MASS 2D: 158.8 G (ref 67–162)
ECHO LV POSTERIOR WALL DIASTOLIC: 1 CM (ref 0.6–0.9)
ECHO LV RELATIVE WALL THICKNESS RATIO: 0.43
ECHO LVOT AREA: 2.8 CM2
ECHO LVOT AV VTI INDEX: 0.77
ECHO LVOT DIAM: 1.9 CM
ECHO LVOT MEAN GRADIENT: 2 MMHG
ECHO LVOT PEAK GRADIENT: 4 MMHG
ECHO LVOT PEAK VELOCITY: 1 M/S
ECHO LVOT SV: 60.6 ML
ECHO LVOT VTI: 21.4 CM
ECHO MV A VELOCITY: 1.02 M/S
ECHO MV AREA VTI: 1.8 CM2
ECHO MV E DECELERATION TIME (DT): 311 MS
ECHO MV E VELOCITY: 0.76 M/S
ECHO MV E/A RATIO: 0.75
ECHO MV E/E' LATERAL: 12.67
ECHO MV E/E' RATIO (AVERAGED): 12.67
ECHO MV LVOT VTI INDEX: 1.6
ECHO MV MAX VELOCITY: 0.9 M/S
ECHO MV MEAN GRADIENT: 1 MMHG
ECHO MV MEAN VELOCITY: 0.4 M/S
ECHO MV PEAK GRADIENT: 3 MMHG
ECHO MV VTI: 34.3 CM
ECHO RA AREA 4C: 9.7 CM2
ECHO RIGHT VENTRICULAR SYSTOLIC PRESSURE (RVSP): 16 MMHG
ECHO RV TAPSE: 1.8 CM (ref 1.7–?)
ECHO TV REGURGITANT MAX VELOCITY: 1.77 M/S
ECHO TV REGURGITANT PEAK GRADIENT: 13 MMHG

## 2024-04-02 PROCEDURE — 93306 TTE W/DOPPLER COMPLETE: CPT | Performed by: INTERNAL MEDICINE

## 2024-04-02 PROCEDURE — 93306 TTE W/DOPPLER COMPLETE: CPT

## 2024-04-30 ENCOUNTER — OFFICE VISIT (OUTPATIENT)
Dept: ORTHOPEDIC SURGERY | Age: 74
End: 2024-04-30
Payer: MEDICARE

## 2024-04-30 VITALS — HEIGHT: 65 IN | WEIGHT: 214 LBS | BODY MASS INDEX: 35.65 KG/M2

## 2024-04-30 DIAGNOSIS — M25.561 ACUTE PAIN OF BOTH KNEES: ICD-10-CM

## 2024-04-30 DIAGNOSIS — M25.562 ACUTE PAIN OF BOTH KNEES: ICD-10-CM

## 2024-04-30 DIAGNOSIS — M17.0 ARTHRITIS OF BOTH KNEES: Primary | ICD-10-CM

## 2024-04-30 PROCEDURE — 20610 DRAIN/INJ JOINT/BURSA W/O US: CPT

## 2024-04-30 PROCEDURE — 1123F ACP DISCUSS/DSCN MKR DOCD: CPT

## 2024-04-30 PROCEDURE — 99213 OFFICE O/P EST LOW 20 MIN: CPT

## 2024-04-30 PROCEDURE — 1036F TOBACCO NON-USER: CPT

## 2024-04-30 PROCEDURE — 1090F PRES/ABSN URINE INCON ASSESS: CPT

## 2024-04-30 PROCEDURE — G8427 DOCREV CUR MEDS BY ELIG CLIN: HCPCS

## 2024-04-30 PROCEDURE — 3017F COLORECTAL CA SCREEN DOC REV: CPT

## 2024-04-30 PROCEDURE — G8399 PT W/DXA RESULTS DOCUMENT: HCPCS

## 2024-04-30 PROCEDURE — G8417 CALC BMI ABV UP PARAM F/U: HCPCS

## 2024-04-30 RX ORDER — METHYLPREDNISOLONE ACETATE 80 MG/ML
80 INJECTION, SUSPENSION INTRA-ARTICULAR; INTRALESIONAL; INTRAMUSCULAR; SOFT TISSUE ONCE
Status: COMPLETED | OUTPATIENT
Start: 2024-04-30 | End: 2024-04-30

## 2024-04-30 RX ORDER — BUPIVACAINE HYDROCHLORIDE 2.5 MG/ML
2 INJECTION, SOLUTION INFILTRATION; PERINEURAL ONCE
Status: COMPLETED | OUTPATIENT
Start: 2024-04-30 | End: 2024-04-30

## 2024-04-30 RX ADMIN — METHYLPREDNISOLONE ACETATE 80 MG: 80 INJECTION, SUSPENSION INTRA-ARTICULAR; INTRALESIONAL; INTRAMUSCULAR; SOFT TISSUE at 08:27

## 2024-04-30 RX ADMIN — BUPIVACAINE HYDROCHLORIDE 5 MG: 2.5 INJECTION, SOLUTION INFILTRATION; PERINEURAL at 08:27

## 2024-04-30 ASSESSMENT — ENCOUNTER SYMPTOMS
VOMITING: 0
NAUSEA: 0
COLOR CHANGE: 0

## 2024-04-30 NOTE — PROGRESS NOTES
[facility] ORTHO SPECIALISTS  [address]  [facility] [address]  Dept: [phone]  Dept Fax: [phone]        Ambulatory Follow Up      Subjective:   Xiomara Monroy is a 74 y.o. year old female who presents to our office today for routine followup regarding her   1. Arthritis of both knees    2. Acute pain of both knees    .    Chief Complaint   Patient presents with    Follow-up     B/L knee pain       Date of Injury: no known injuries.     HPI Xiomara Monroy is a 74 y.o. who presents today in follow for her bilateral knee pain.  The patient was last seen on 3/28/2024 and underwent treatment in the form of a left knee aspiration and corticosteroid injection.  She also was provided with a prescription for physical therapy for both knees.  The patient notes that she received significant improvement in her pain from the aspiration and steroid injection.  She also states that she has made good progress with physical therapy.  She has been going to physical therapy 3 times per week in [place].  She still feels like the left knee is more bothersome than the right.  She also states that the left knee is still more swollen than the right.  She describes the pain as primarily being along the medial joint space bilaterally.  She has continued to take Tylenol and Norco as needed for pain.  She denies any redness, warmth, fevers, or chills.    Review of Systems   Constitutional:  Negative for chills and fever.   Gastrointestinal:  Negative for nausea and vomiting.   Skin:  Negative for color change and rash.   Neurological:  Negative for weakness and numbness.         Objective :   There were no vitals taken for this visit. There is no height or weight on file to calculate BMI.  General: Xiomara Monroy is a 74 y.o. female who is alert and oriented and sitting comfortably in our office.  Ortho Exam  MS:  Evaluation of the Bilateral knee

## 2024-05-02 ENCOUNTER — OFFICE VISIT (OUTPATIENT)
Dept: UROLOGY | Age: 74
End: 2024-05-02
Payer: MEDICARE

## 2024-05-02 VITALS
BODY MASS INDEX: 35.65 KG/M2 | WEIGHT: 214 LBS | HEART RATE: 84 BPM | OXYGEN SATURATION: 97 % | DIASTOLIC BLOOD PRESSURE: 86 MMHG | HEIGHT: 65 IN | RESPIRATION RATE: 16 BRPM | TEMPERATURE: 97.7 F | SYSTOLIC BLOOD PRESSURE: 128 MMHG

## 2024-05-02 DIAGNOSIS — Z87.442 HISTORY OF KIDNEY STONES: ICD-10-CM

## 2024-05-02 DIAGNOSIS — R35.0 URINARY FREQUENCY: ICD-10-CM

## 2024-05-02 DIAGNOSIS — R39.12 WEAK URINARY STREAM: Primary | ICD-10-CM

## 2024-05-02 LAB — POST VOID RESIDUAL (PVR): 82 ML

## 2024-05-02 PROCEDURE — 51798 US URINE CAPACITY MEASURE: CPT | Performed by: NURSE PRACTITIONER

## 2024-05-02 PROCEDURE — G8427 DOCREV CUR MEDS BY ELIG CLIN: HCPCS | Performed by: NURSE PRACTITIONER

## 2024-05-02 PROCEDURE — 3017F COLORECTAL CA SCREEN DOC REV: CPT | Performed by: NURSE PRACTITIONER

## 2024-05-02 PROCEDURE — 1036F TOBACCO NON-USER: CPT | Performed by: NURSE PRACTITIONER

## 2024-05-02 PROCEDURE — G8399 PT W/DXA RESULTS DOCUMENT: HCPCS | Performed by: NURSE PRACTITIONER

## 2024-05-02 PROCEDURE — 99213 OFFICE O/P EST LOW 20 MIN: CPT | Performed by: NURSE PRACTITIONER

## 2024-05-02 PROCEDURE — G8417 CALC BMI ABV UP PARAM F/U: HCPCS | Performed by: NURSE PRACTITIONER

## 2024-05-02 PROCEDURE — 3079F DIAST BP 80-89 MM HG: CPT | Performed by: NURSE PRACTITIONER

## 2024-05-02 PROCEDURE — 1090F PRES/ABSN URINE INCON ASSESS: CPT | Performed by: NURSE PRACTITIONER

## 2024-05-02 PROCEDURE — 1123F ACP DISCUSS/DSCN MKR DOCD: CPT | Performed by: NURSE PRACTITIONER

## 2024-05-02 PROCEDURE — 3074F SYST BP LT 130 MM HG: CPT | Performed by: NURSE PRACTITIONER

## 2024-05-02 ASSESSMENT — ENCOUNTER SYMPTOMS
EYE REDNESS: 0
BACK PAIN: 0
SHORTNESS OF BREATH: 0
CONSTIPATION: 0
WHEEZING: 0
EYE PAIN: 0
COUGH: 0
NAUSEA: 0
ABDOMINAL PAIN: 0
VOMITING: 0

## 2024-05-02 NOTE — PROGRESS NOTES
Review of Systems   Constitutional:  Negative for chills, fatigue and fever.   Eyes:  Negative for pain, redness and visual disturbance.   Respiratory:  Negative for cough, shortness of breath and wheezing.    Cardiovascular:  Negative for chest pain and leg swelling.   Gastrointestinal:  Negative for abdominal pain, constipation, nausea and vomiting.   Genitourinary:  Positive for difficulty urinating, frequency and urgency. Negative for dysuria, flank pain and hematuria.   Musculoskeletal:  Negative for back pain, joint swelling and myalgias.   Skin:  Negative for rash and wound.   Neurological:  Negative for dizziness, tremors and numbness.   Hematological:  Does not bruise/bleed easily.

## 2024-05-02 NOTE — PROGRESS NOTES
[facility] PHYSICIANS [facility] UROLOGY CENTER  [address]  [facility] [address]  Dept: [phone]    [facility] Urology Office Note - Established    Patient:  Xiomara Monroy  YOB: 1950  Date: 5/2/2024    The patient is a 74 y.o. female whopresents today for evaluation of the following problems:   Chief Complaint   Patient presents with    OAB     Dribbling, going 20+ times a day, nocturia        HPI  Patient is a 74-year-old female with a history of stones presenting for difficulty with urination.  Over the last month she has developed urinary frequency with weak stream.  She states that she has a constant urge to void.  She is voiding every 15 minutes during the day and every hour at nighttime.  She is voiding in small amounts.  Her PVR today is 82 cc.  She denies any pelvic pressure or heaviness-1 vaginal birth, full-term.  Her bowels are moving okay.  No hematuria, dysuria, flank pain.  No chills        Summary of old records: N/A    Additional History: N/A    Procedures Today: N/A    Urinalysis today:  No results found for this visit on 05/02/24.    Imaging Reviewed during this Office Visit: none    Last BUN and creatinine:  Lab Results   Component Value Date    BUN 20 01/18/2024     Lab Results   Component Value Date    CREATININE 1.0 (H) 01/18/2024       Additional Lab/Culture results: none    PAST MEDICAL, FAMILY AND SOCIAL HISTORY UPDATE:  Past Medical History:   Diagnosis Date    Arthritis     osteoarthritis    Chronic back pain     COVID-19 2020    tiredness X 7 days. No treatment or hospitalization.    Gastric nodule     patient reports    GERD (gastroesophageal reflux disease)     GIST (gastrointestinal stromal tumor), non-malignant     History of stomach ulcers     Hypertension     Kidney stones     Obesity     Pain     R/L kidney/back    Under care of team     Urology Dr. Chago OLSON/ [place]/ last seen 8-2023    Under care of team

## 2024-05-07 ENCOUNTER — PROCEDURE VISIT (OUTPATIENT)
Dept: UROLOGY | Age: 74
End: 2024-05-07

## 2024-05-07 ENCOUNTER — TELEPHONE (OUTPATIENT)
Dept: UROLOGY | Age: 74
End: 2024-05-07

## 2024-05-07 VITALS — HEART RATE: 86 BPM | DIASTOLIC BLOOD PRESSURE: 95 MMHG | SYSTOLIC BLOOD PRESSURE: 142 MMHG | TEMPERATURE: 97.3 F

## 2024-05-07 DIAGNOSIS — R35.0 URINARY FREQUENCY: ICD-10-CM

## 2024-05-07 DIAGNOSIS — R39.12 WEAK URINARY STREAM: Primary | ICD-10-CM

## 2024-05-07 DIAGNOSIS — R39.14 FEELING OF INCOMPLETE BLADDER EMPTYING: ICD-10-CM

## 2024-05-07 DIAGNOSIS — R39.15 URINARY URGENCY: ICD-10-CM

## 2024-05-07 NOTE — PROGRESS NOTES
Urodynamic Procedure Note    Xiomara HARMAN Porfirio  1950        INDICATIONS FOR PROCEDURE:  The patient is a 74 y.o. female with a history of stones, urinary frequency, urinary urgency, and weak urinary stream.     The patient presents today for urodynamics to evaluate the cause of voiding dysfunction. The risks and benefits of the procedure, as well as possible alternatives and complications were discussed and the patient consented to the procedure.    Equipment: Proviation      DETAILS OF THE PROCEDURE:  Procedure:  The Patient was taken to the Urodynamics suite and a free urine flow was obtained followed by catheterization for residual urine. A double-lumen urodynamic catheter was introduced to the bladder for measuring bladder pressure, and for filling. EMG patch electrodes were placed perianally for recording activity of the anal sphincter. A vaginal catheter was inserted to record the abdominal pressure. The entire process was displayed and analyzed using the Proviation Urodynamic machine and software.    Uroflow was performed with the following results:  Voided volume: 94mL  Voiding time: 26s  Average flow: 4.1 mL/s  Max flow: 7.9 mL/s  PVR: 96 mL    A urethral catheter was placed, as was a vaginal catheter  and EMG electrodes. After an appropriate timeout was performed bladder  filling commenced. Filling parameters were as follows:  1st sensation: 150 mL   1st desire to void: 168mL  Strong desire to void: 219 mL  Max capacity: 244 mL    Cough: yes, no leak.  Detrusor contractions: no  Leakage: none.    The patient was then instructed to void.  Pressure flow study demonstrated the following:  Voided volume: 205 mL  Average flow: 6.1 mL/s  Max flow rate: 11.0 mL/s  pDet at max flow: 25 cmH2O  Max Pdet: 34 cmH2O  PVR: 38 mL    Summary: patient tolerated procedure well. UDS will be printed and provided to Dr. Anders for final interpretation. She will keep cysto as scheduled.

## 2024-05-16 ENCOUNTER — OFFICE VISIT (OUTPATIENT)
Dept: ORTHOPEDIC SURGERY | Age: 74
End: 2024-05-16
Payer: MEDICARE

## 2024-05-16 VITALS — HEIGHT: 65 IN | BODY MASS INDEX: 35.65 KG/M2 | WEIGHT: 214 LBS

## 2024-05-16 DIAGNOSIS — M25.462 KNEE EFFUSION, LEFT: ICD-10-CM

## 2024-05-16 DIAGNOSIS — M17.0 ARTHRITIS OF BOTH KNEES: Primary | ICD-10-CM

## 2024-05-16 PROCEDURE — 20610 DRAIN/INJ JOINT/BURSA W/O US: CPT

## 2024-05-16 RX ORDER — LIDOCAINE HYDROCHLORIDE 10 MG/ML
2 INJECTION, SOLUTION INFILTRATION; PERINEURAL ONCE
Status: COMPLETED | OUTPATIENT
Start: 2024-05-16 | End: 2024-05-16

## 2024-05-16 RX ORDER — METHYLPREDNISOLONE 4 MG/1
TABLET ORAL
Qty: 1 KIT | Refills: 0 | Status: SHIPPED | OUTPATIENT
Start: 2024-05-16 | End: 2024-05-22

## 2024-05-16 RX ADMIN — LIDOCAINE HYDROCHLORIDE 2 ML: 10 INJECTION, SOLUTION INFILTRATION; PERINEURAL at 08:31

## 2024-05-16 NOTE — PROGRESS NOTES
[facility] ORTHO SPECIALISTS  [address]  [facility] [address]  Dept: [phone]  Dept Fax: [phone]          Left knee Visit - Follow up    Subjective:     Chief Complaint   Patient presents with    Follow-up     Re-drain Lt knee      HPI:     Xiomara HuitronIdrisNohemy presents today for Left knee pain. The patient is here today for a left knee aspiration. She last had a knee aspiration and corticosteroid injection on 3/28/24 and this provided the patient with significant relief.  She has been working with physical therapy and this has been going well.  She is requesting a knee aspiration today as her knee has become swollen again and her physical therapist feels like this is inhibiting her progress.  We cannot do another corticosteroid injection as her last injection was only 1 and half months ago.  The patient understands this.  She denies any redness, warmth, fevers, or chills.    I have reviewed the CC, and if not present in this note, I have reviewed in the patient's chart.   I agree with the documentation provided by other staff and have reviewed their documentation prior to providing my signature indicating agreement.  Vitals:   Ht 1.651 m (5' 5\")   Wt 97.1 kg (214 lb)   BMI 35.61 kg/m²  Body mass index is 35.61 kg/m².    Exam: Examination of the left knee demonstrates that there is a moderate knee effusion.  There is no erythema, ecchymosis, or skin lesions.  Range of motion of the knee includes 3-100.      Assessment:     1. Arthritis of both knees    2. Knee effusion, left          Procedure:   Procedure: Yes    After informed consent was obtained verbally, using a superior lateral approach to the  Left  knee, the skin was locally prepped with Betadine and locally anesthetized with ethyl chloride spray.  The knee was then aspirated of  0 mL of fluid. A band-aid and ace wrap bandage was applied. Patient tolerated the procedure well

## 2024-06-13 ENCOUNTER — TELEMEDICINE (OUTPATIENT)
Dept: FAMILY MEDICINE CLINIC | Age: 74
End: 2024-06-13

## 2024-06-13 DIAGNOSIS — E87.6 HYPOKALEMIA: ICD-10-CM

## 2024-06-13 DIAGNOSIS — I10 ESSENTIAL HYPERTENSION: ICD-10-CM

## 2024-06-13 DIAGNOSIS — Z09 HOSPITAL DISCHARGE FOLLOW-UP: ICD-10-CM

## 2024-06-13 DIAGNOSIS — Z12.31 ENCOUNTER FOR SCREENING MAMMOGRAM FOR BREAST CANCER: ICD-10-CM

## 2024-06-13 DIAGNOSIS — E87.1 HYPONATREMIA: Primary | ICD-10-CM

## 2024-06-13 RX ORDER — LISINOPRIL AND HYDROCHLOROTHIAZIDE 20; 12.5 MG/1; MG/1
1 TABLET ORAL DAILY
Qty: 90 TABLET | Refills: 1 | Status: SHIPPED | OUTPATIENT
Start: 2024-06-13

## 2024-06-13 RX ORDER — LISINOPRIL 20 MG/1
20 TABLET ORAL DAILY
COMMUNITY
Start: 2024-06-03 | End: 2024-06-13

## 2024-06-13 ASSESSMENT — ENCOUNTER SYMPTOMS
COUGH: 0
SHORTNESS OF BREATH: 0

## 2024-06-13 NOTE — PROGRESS NOTES
Post-Discharge Transitional Care Follow Up      Xiomara HARMAN Porfirio   YOB: 1950    Date of Office Visit:  6/13/2024  Date of Hospital Admission: 9/13/23  Date of Hospital Discharge: 9/13/23  Readmission Risk Score(high >=14%. Medium >=10%):No data recorded    Care management risk score Rising risk (score 2-5) and Complex Care (Scores >=6): No Risk Score On File     Non face to face  following discharge, date last encounter closed (first attempt may have been earlier): *No documented post hospital discharge outreach found in the last 14 days     Call initiated 2 business days of discharge: *No response recorded in the last 14 days     Below is the assessment and plan developed based on review of pertinent history, physical exam, labs, studies, and medications.  Hyponatremia  -     Basic Metabolic Panel; Future  Essential hypertension  -     lisinopril-hydroCHLOROthiazide (PRINZIDE;ZESTORETIC) 20-12.5 MG per tablet; Take 1 tablet by mouth daily, Disp-90 tablet, R-1Normal  Hypokalemia  -     Basic Metabolic Panel; Future  Hospital discharge follow-up  -     [place] DISCHARGE MEDS RECONCILED W/ CURRENT OUTPATIENT MED LIST      Medical Decision Making: moderate complexity  No follow-ups on file.         Subjective:   HPI    Inpatient course: Discharge summary reviewed- see chart.  Pt states she woke up not feeling well. She eventually went to the er with the help of friends. She was admitted to the hospital. She was found to be slightly hyponatremic and hypokalemic- her hctz was stopped.  She does report more leg swelling now.  These lab values are an uncommon finding and she would like to go back on her water pill.  Will restart and recheck lab in a month. Otherwise she is back to baseline.     Follows with gi on July 9    Patient Active Problem List   Diagnosis    Essential hypertension    Osteopenia    Status post total replacement of left hip    Obesity (BMI 30-39.9)    Hyperglycemia    High

## 2024-06-13 NOTE — PROGRESS NOTES
Patient is present for f/u from [facility]  Patient was in the hospital 5/30-6/03  States while in the hospital she took a shower and slipped and fell  States she hit her right side and her head    Patient would like to know her potassium and sodium levels are okay

## 2024-06-17 ENCOUNTER — HOSPITAL ENCOUNTER (OUTPATIENT)
Dept: GENERAL RADIOLOGY | Facility: CLINIC | Age: 74
Discharge: HOME | End: 2024-06-19
Payer: MEDICARE

## 2024-06-17 ENCOUNTER — HOSPITAL ENCOUNTER (OUTPATIENT)
Facility: CLINIC | Age: 74
Discharge: HOME | End: 2024-06-19
Payer: MEDICARE

## 2024-06-17 DIAGNOSIS — N20.0 KIDNEY STONE: ICD-10-CM

## 2024-06-17 DIAGNOSIS — R82.991 HYPOCITRATURIA: ICD-10-CM

## 2024-06-17 PROCEDURE — 74018 RADEX ABDOMEN 1 VIEW: CPT

## 2024-06-17 NOTE — PROGRESS NOTES
Cystoscopy Operative Note (6/24/24):  Surgeon: Sander Anders MD  Anesthesia: Urethral 2% Xylocaine  Indications:   1. Weak urinary stream    2. Urinary frequency      Position: Dorsal Lithotomy    Findings:   Risks and Benefits discussed with patient prior to procedure.  The patient was prepped and draped in the usual sterile fashion.  The flexible cystoscope was advanced through the urethra and into the bladder.  The bladder was thoroughly inspected and the following was noted:    Vagina: normal appearing vagina with normal color and discharge, no lesions  Residual Urine: moderate  Urethra: normal appearing urethra with no masses, tenderness or lesions  Bladder: No tumors or CIS noted.  No bladder diverticulum.  There was none trabeculation noted.  Ureters: Clear efflux from both ureters.  Orifices with normal configuration and location.    The cystoscope was removed.  The patient tolerated the procedure well.    Agree with the ROS entered by the MA.    Patient has incomplete bladder emptying.  InterStim PNE.  Continue Urocit-K for stone prevention.

## 2024-06-24 ENCOUNTER — PROCEDURE VISIT (OUTPATIENT)
Dept: UROLOGY | Age: 74
End: 2024-06-24
Payer: MEDICARE

## 2024-06-24 VITALS — SYSTOLIC BLOOD PRESSURE: 128 MMHG | DIASTOLIC BLOOD PRESSURE: 88 MMHG | HEART RATE: 102 BPM | TEMPERATURE: 97.5 F

## 2024-06-24 DIAGNOSIS — R39.12 WEAK URINARY STREAM: Primary | ICD-10-CM

## 2024-06-24 DIAGNOSIS — R39.14 FEELING OF INCOMPLETE BLADDER EMPTYING: ICD-10-CM

## 2024-06-24 PROCEDURE — 52000 CYSTOURETHROSCOPY: CPT | Performed by: UROLOGY

## 2024-06-25 ENCOUNTER — TELEPHONE (OUTPATIENT)
Dept: UROLOGY | Age: 74
End: 2024-06-25

## 2024-07-02 ENCOUNTER — OFFICE VISIT (OUTPATIENT)
Dept: ORTHOPEDIC SURGERY | Age: 74
End: 2024-07-02
Payer: MEDICARE

## 2024-07-02 VITALS — BODY MASS INDEX: 35.65 KG/M2 | WEIGHT: 214 LBS | HEIGHT: 65 IN

## 2024-07-02 DIAGNOSIS — M25.561 CHRONIC PAIN OF BOTH KNEES: ICD-10-CM

## 2024-07-02 DIAGNOSIS — M17.0 ARTHRITIS OF BOTH KNEES: Primary | ICD-10-CM

## 2024-07-02 DIAGNOSIS — G89.29 CHRONIC PAIN OF BOTH KNEES: ICD-10-CM

## 2024-07-02 DIAGNOSIS — M25.562 CHRONIC PAIN OF BOTH KNEES: ICD-10-CM

## 2024-07-02 PROCEDURE — G8417 CALC BMI ABV UP PARAM F/U: HCPCS

## 2024-07-02 PROCEDURE — 99213 OFFICE O/P EST LOW 20 MIN: CPT

## 2024-07-02 PROCEDURE — 3017F COLORECTAL CA SCREEN DOC REV: CPT

## 2024-07-02 PROCEDURE — 1123F ACP DISCUSS/DSCN MKR DOCD: CPT

## 2024-07-02 PROCEDURE — G8427 DOCREV CUR MEDS BY ELIG CLIN: HCPCS

## 2024-07-02 PROCEDURE — G8399 PT W/DXA RESULTS DOCUMENT: HCPCS

## 2024-07-02 PROCEDURE — 1036F TOBACCO NON-USER: CPT

## 2024-07-02 PROCEDURE — 1090F PRES/ABSN URINE INCON ASSESS: CPT

## 2024-07-02 ASSESSMENT — ENCOUNTER SYMPTOMS
VOMITING: 0
COLOR CHANGE: 0
NAUSEA: 0

## 2024-07-02 NOTE — PROGRESS NOTES
[facility] ORTHO SPECIALISTS  [address]  [facility] [facility] [address]  Dept: [phone]  Dept Fax: [phone]        Ambulatory Follow Up      Subjective:   Xiomara Monroy is a 74 y.o. year old female who presents to our office today for routine followup regarding her   1. Arthritis of both knees    2. Chronic pain of both knees    .    Chief Complaint   Patient presents with    Follow-up     F/U pain and swelling in Lt knee.  Had a fall during her recent hospital stay at [facility]. She slipped in the shower and fell on her right side. She pulled from the shower knob with her left shoulder and is now having an increase in pain. She is disappointed because the doctors at [place] did not do a work up on her or any X-Ray's          HPI Xiomara Monroy  is a 74 y.o.   female who presents today in follow for her left knee pain.  The patient was last seen on 5/16/2024 and underwent treatment in the form of an attempted knee aspiration.  Unfortunately no aspirate was obtained.  She was instructed to continue working with physical therapy and she was prescribed a Medrol Dosepak.  She last had a corticosteroid injection on 3/28/2024 and the injection provided her with significant relief.  Unfortunately she sustained a fall approximately 2 weeks ago when she was at [facility] for a bowel obstruction.  She states that she was in the shower when she fell and landed onto her right side.  She states that when she fell she had pain throughout her head as well as the right buttock and right knee.  The fall was not documented at the hospital and no images were obtained after the fall.  She does state that she is feeling better since the fall.  She did have a significant increase in her knee pain, but states that she has increased her physical therapy and that this has helped.  She went to physical therapy yesterday and they did dry needling, soft

## 2024-07-17 ENCOUNTER — TELEPHONE (OUTPATIENT)
Dept: GASTROENTEROLOGY | Age: 74
End: 2024-07-17

## 2024-07-17 NOTE — TELEPHONE ENCOUNTER
Procedure scheduled/Hamdani  Procedure: EUS  Dx: GIST tumor  Ordering: Jesse  Date: 9/3/24  Time: 1:00pm/arrive 11:30am  Hospital: [facility]  Bowel prep instructions sent to patient: none  Patient advised by phone/mail:  phone/EUS instructions mailed

## 2024-07-17 NOTE — TELEPHONE ENCOUNTER
TBS for followup EUS/Hamdani  Dx: GIST  Referring: Jesse  Writer jayesh for patient to return call to schedule procedure.

## 2024-07-18 ENCOUNTER — HOSPITAL ENCOUNTER (OUTPATIENT)
Age: 74
Discharge: HOME | End: 2024-07-18
Payer: MEDICARE

## 2024-07-18 ENCOUNTER — TELEPHONE (OUTPATIENT)
Dept: UROLOGY | Age: 74
End: 2024-07-18

## 2024-07-18 LAB
ALANINE AMINOTRANSFERASE, FIBROMETER: NORMAL
ALPHA-2-MACROGLOBULIN, FIBROMETER: NORMAL
ASPARTATE AMINOTRANSFERASE, FIBROMETER: NORMAL
CIRRHOMETER PATIENT SCORE: NORMAL
EER FIBROMETER REPORT: NORMAL
FIBROMETER INTERPRETATION: NORMAL
FIBROMETER PATIENT SCORE: NORMAL
FIBROMETER PLATELET COUNT: 278
FIBROMETER PROTHROMBIN INDEX: NORMAL
FIBROSIS METAVIR CLASSIFICATION: NORMAL
GAMMA GLUTAMYL TRANSFERASE, FIBROMETER: NORMAL
INFLAMETER METAVIR CLASSIFICATION: NORMAL
INFLAMETER PATIENT SCORE: NORMAL
UREA NITROGEN, FIBROMETER: NORMAL

## 2024-07-18 PROCEDURE — 84460 ALANINE AMINO (ALT) (SGPT): CPT

## 2024-07-18 PROCEDURE — 82977 ASSAY OF GGT: CPT

## 2024-07-18 PROCEDURE — 83883 ASSAY NEPHELOMETRY NOT SPEC: CPT

## 2024-07-18 PROCEDURE — 84520 ASSAY OF UREA NITROGEN: CPT

## 2024-07-18 PROCEDURE — 84450 TRANSFERASE (AST) (SGOT): CPT

## 2024-07-18 PROCEDURE — 36415 COLL VENOUS BLD VENIPUNCTURE: CPT

## 2024-07-18 NOTE — TELEPHONE ENCOUNTER
Patient called in and needs to cancel her PNE on 7/22. Patient has a family emergency and is leaving town. Patient stated, \" She will call us back to get rescheduled once she gets back in town.\"

## 2024-07-23 LAB
ALANINE AMINOTRANSFERASE, FIBROMETER: 29 U/L (ref 5–40)
ALPHA-2-MACROGLOBULIN, FIBROMETER: 230 MG/DL (ref 131–293)
ASPARTATE AMINOTRANSFERASE, FIBROMETER: 30 U/L (ref 9–40)
CIRRHOMETER PATIENT SCORE: 0
EER FIBROMETER REPORT: ABNORMAL
FIBROMETER INTERPRETATION: ABNORMAL
FIBROMETER PATIENT SCORE: 0.21
FIBROMETER PLATELET COUNT: 278
FIBROMETER PROTHROMBIN INDEX: 115 % (ref 90–120)
FIBROSIS METAVIR CLASSIFICATION: ABNORMAL
GAMMA GLUTAMYL TRANSFERASE, FIBROMETER: 60 U/L (ref 7–33)
INFLAMETER METAVIR CLASSIFICATION: ABNORMAL
INFLAMETER PATIENT SCORE: 0.18
UREA NITROGEN, FIBROMETER: 25 MG/DL (ref 7–20)

## 2024-08-20 ENCOUNTER — OFFICE VISIT (OUTPATIENT)
Dept: FAMILY MEDICINE CLINIC | Age: 74
End: 2024-08-20

## 2024-08-20 VITALS
SYSTOLIC BLOOD PRESSURE: 120 MMHG | DIASTOLIC BLOOD PRESSURE: 84 MMHG | BODY MASS INDEX: 35.32 KG/M2 | HEIGHT: 65 IN | OXYGEN SATURATION: 98 % | WEIGHT: 212 LBS | HEART RATE: 77 BPM

## 2024-08-20 DIAGNOSIS — Z86.39 HISTORY OF ELEVATED GLUCOSE: ICD-10-CM

## 2024-08-20 DIAGNOSIS — Z00.00 MEDICARE ANNUAL WELLNESS VISIT, SUBSEQUENT: ICD-10-CM

## 2024-08-20 DIAGNOSIS — Z23 NEED FOR INFLUENZA VACCINATION: Primary | ICD-10-CM

## 2024-08-20 LAB — HBA1C MFR BLD: 5.6 %

## 2024-08-20 RX ORDER — POTASSIUM CITRATE 10 MEQ/1
TABLET, EXTENDED RELEASE ORAL
COMMUNITY
Start: 2024-07-12

## 2024-08-20 ASSESSMENT — PATIENT HEALTH QUESTIONNAIRE - PHQ9
SUM OF ALL RESPONSES TO PHQ QUESTIONS 1-9: 0
1. LITTLE INTEREST OR PLEASURE IN DOING THINGS: NOT AT ALL
2. FEELING DOWN, DEPRESSED OR HOPELESS: NOT AT ALL
SUM OF ALL RESPONSES TO PHQ9 QUESTIONS 1 & 2: 0
SUM OF ALL RESPONSES TO PHQ QUESTIONS 1-9: 0

## 2024-08-20 ASSESSMENT — LIFESTYLE VARIABLES
HOW OFTEN DO YOU HAVE A DRINK CONTAINING ALCOHOL: 2-4 TIMES A MONTH
HOW MANY STANDARD DRINKS CONTAINING ALCOHOL DO YOU HAVE ON A TYPICAL DAY: 1 OR 2

## 2024-08-20 NOTE — PROGRESS NOTES
or treatment                           Objective   Vitals:    08/20/24 0737   BP: 120/84   Site: Left Upper Arm   Position: Sitting   Cuff Size: Large Adult   Pulse: 77   SpO2: 98%   Weight: 96.2 kg (212 lb)   Height: 1.651 m (5' 5\")      Body mass index is 35.28 kg/m².                  Allergies   Allergen Reactions    Allopurinol Other (See Comments)     \"made gout worse\"    Atorvastatin Other (See Comments), Rash and Swelling    Morphine Other (See Comments)     Does not like how it makes her feel     Prior to Visit Medications    Medication Sig Taking? Authorizing Provider   potassium citrate (UROCIT-K) 10 MEQ (1080 MG) extended release tablet  Yes ProviderLawrence MD   lisinopril-hydroCHLOROthiazide (PRINZIDE;ZESTORETIC) 20-12.5 MG per tablet Take 1 tablet by mouth daily Yes Adenike John APRN - CNP   valACYclovir (VALTREX) 1 g tablet take 2 tablets by mouth AT ONSET repeat in 12 hours for OUTBREAKS...  (REFER TO PRESCRIPTION NOTES). Yes ProviderLawrence MD   cyclobenzaprine (FLEXERIL) 10 MG tablet Take 1 tablet by mouth 2 times daily as needed Yes ProviderLawrence MD   HYDROcodone-acetaminophen (NORCO) 5-325 MG per tablet take 1 tablet by mouth every 6 hours AS NEEDED FOR PAIN Yes ProviderLawrence MD   Handicap Placard MISC by Does not apply route Yes Adenike John APRN - CNP       CareTeam (Including outside providers/suppliers regularly involved in providing care):   Patient Care Team:  Adenike John APRN - CNP as PCP - General (Certified Nurse Practitioner)  Adenike John APRN - CNP as PCP - Empaneled Provider  Adenike John APRN - CNP as Referring Physician (Certified Nurse Practitioner)  Sander Anders MD as Consulting Physician (Urology)      Reviewed and updated this visit:  Allergies  Meds

## 2024-09-03 ENCOUNTER — ANESTHESIA (OUTPATIENT)
Dept: OPERATING ROOM | Age: 74
End: 2024-09-03
Payer: MEDICARE

## 2024-09-03 ENCOUNTER — HOSPITAL ENCOUNTER (OUTPATIENT)
Age: 74
Setting detail: OUTPATIENT SURGERY
Discharge: HOME | End: 2024-09-03
Attending: INTERNAL MEDICINE | Admitting: INTERNAL MEDICINE
Payer: MEDICARE

## 2024-09-03 ENCOUNTER — HOSPITAL ENCOUNTER (OUTPATIENT)
Dept: ULTRASOUND IMAGING | Age: 74
Discharge: HOME | End: 2024-09-05
Payer: MEDICARE

## 2024-09-03 ENCOUNTER — ANESTHESIA EVENT (OUTPATIENT)
Dept: OPERATING ROOM | Age: 74
End: 2024-09-03
Payer: MEDICARE

## 2024-09-03 VITALS
OXYGEN SATURATION: 97 % | DIASTOLIC BLOOD PRESSURE: 94 MMHG | TEMPERATURE: 97.2 F | RESPIRATION RATE: 19 BRPM | HEART RATE: 76 BPM | SYSTOLIC BLOOD PRESSURE: 157 MMHG

## 2024-09-03 DIAGNOSIS — R10.9 ABDOMINAL PAIN, UNSPECIFIED ABDOMINAL LOCATION: ICD-10-CM

## 2024-09-03 LAB
BUN BLD-MCNC: 27 MG/DL (ref 8–26)
CA-I BLD-SCNC: 1.36 MMOL/L (ref 1.15–1.33)
CHLORIDE BLD-SCNC: 104 MMOL/L (ref 98–107)
CO2 BLD CALC-SCNC: 26 MMOL/L (ref 22–30)
CRITICAL ACTION: NORMAL
CRITICAL NOTIFICATION DATE/TIME: NORMAL
CRITICAL VALUE READ BACK: NO
EGFR, POC: 78 ML/MIN/1.73M2
GLUCOSE BLD-MCNC: 92 MG/DL (ref 74–100)
HCO3 VENOUS: 25.7 MMOL/L (ref 22–29)
O2 SAT, VEN: 56.4 % (ref 60–85)
PCO2 VENOUS: 40.8 MM HG (ref 41–51)
PH VENOUS: 7.41 (ref 7.32–7.43)
PO2 VENOUS: 29.3 MM HG (ref 30–50)
POC ANION GAP: 11 MMOL/L (ref 7–16)
POC CREATININE: 0.8 MG/DL (ref 0.51–1.19)
POSITIVE BASE EXCESS, VEN: 0.9 MMOL/L (ref 0–3)
POTASSIUM BLD-SCNC: 4.2 MMOL/L (ref 3.5–4.5)
SODIUM BLD-SCNC: 140 MMOL/L (ref 138–146)

## 2024-09-03 PROCEDURE — 82565 ASSAY OF CREATININE: CPT

## 2024-09-03 PROCEDURE — 3700000000 HC ANESTHESIA ATTENDED CARE: Performed by: INTERNAL MEDICINE

## 2024-09-03 PROCEDURE — 82803 BLOOD GASES ANY COMBINATION: CPT

## 2024-09-03 PROCEDURE — 84520 ASSAY OF UREA NITROGEN: CPT

## 2024-09-03 PROCEDURE — 3609018500 HC EGD US SCOPE W/ADJACENT STRUCTURES: Performed by: INTERNAL MEDICINE

## 2024-09-03 PROCEDURE — 2580000003 HC RX 258: Performed by: ANESTHESIOLOGY

## 2024-09-03 PROCEDURE — 7100000001 HC PACU RECOVERY - ADDTL 15 MIN: Performed by: INTERNAL MEDICINE

## 2024-09-03 PROCEDURE — 3700000001 HC ADD 15 MINUTES (ANESTHESIA): Performed by: INTERNAL MEDICINE

## 2024-09-03 PROCEDURE — 76975 GI ENDOSCOPIC ULTRASOUND: CPT | Performed by: INTERNAL MEDICINE

## 2024-09-03 PROCEDURE — 80051 ELECTROLYTE PANEL: CPT

## 2024-09-03 PROCEDURE — 43259 EGD US EXAM DUODENUM/JEJUNUM: CPT | Performed by: INTERNAL MEDICINE

## 2024-09-03 PROCEDURE — 82947 ASSAY GLUCOSE BLOOD QUANT: CPT

## 2024-09-03 PROCEDURE — 7100000000 HC PACU RECOVERY - FIRST 15 MIN: Performed by: INTERNAL MEDICINE

## 2024-09-03 PROCEDURE — 2500000003 HC RX 250 WO HCPCS: Performed by: NURSE ANESTHETIST, CERTIFIED REGISTERED

## 2024-09-03 PROCEDURE — 6360000002 HC RX W HCPCS: Performed by: NURSE ANESTHETIST, CERTIFIED REGISTERED

## 2024-09-03 PROCEDURE — 82330 ASSAY OF CALCIUM: CPT

## 2024-09-03 PROCEDURE — 3609017100 HC EGD: Performed by: INTERNAL MEDICINE

## 2024-09-03 PROCEDURE — 43235 EGD DIAGNOSTIC BRUSH WASH: CPT | Performed by: INTERNAL MEDICINE

## 2024-09-03 PROCEDURE — 7100000010 HC PHASE II RECOVERY - FIRST 15 MIN: Performed by: INTERNAL MEDICINE

## 2024-09-03 RX ORDER — DIPHENHYDRAMINE HYDROCHLORIDE 50 MG/ML
12.5 INJECTION INTRAMUSCULAR; INTRAVENOUS
Status: DISCONTINUED | OUTPATIENT
Start: 2024-09-03 | End: 2024-09-03 | Stop reason: HOSPADM

## 2024-09-03 RX ORDER — HYDRALAZINE HYDROCHLORIDE 20 MG/ML
10 INJECTION INTRAMUSCULAR; INTRAVENOUS
Status: DISCONTINUED | OUTPATIENT
Start: 2024-09-03 | End: 2024-09-03 | Stop reason: HOSPADM

## 2024-09-03 RX ORDER — MEPERIDINE HYDROCHLORIDE 50 MG/ML
12.5 INJECTION INTRAMUSCULAR; INTRAVENOUS; SUBCUTANEOUS EVERY 5 MIN PRN
Status: DISCONTINUED | OUTPATIENT
Start: 2024-09-03 | End: 2024-09-03 | Stop reason: HOSPADM

## 2024-09-03 RX ORDER — METOCLOPRAMIDE HYDROCHLORIDE 5 MG/ML
10 INJECTION INTRAMUSCULAR; INTRAVENOUS
Status: DISCONTINUED | OUTPATIENT
Start: 2024-09-03 | End: 2024-09-03 | Stop reason: HOSPADM

## 2024-09-03 RX ORDER — NALOXONE HYDROCHLORIDE 0.4 MG/ML
INJECTION, SOLUTION INTRAMUSCULAR; INTRAVENOUS; SUBCUTANEOUS PRN
Status: DISCONTINUED | OUTPATIENT
Start: 2024-09-03 | End: 2024-09-03 | Stop reason: HOSPADM

## 2024-09-03 RX ORDER — DROPERIDOL 2.5 MG/ML
0.62 INJECTION, SOLUTION INTRAMUSCULAR; INTRAVENOUS
Status: DISCONTINUED | OUTPATIENT
Start: 2024-09-03 | End: 2024-09-03 | Stop reason: HOSPADM

## 2024-09-03 RX ORDER — SODIUM CHLORIDE 0.9 % (FLUSH) 0.9 %
5-40 SYRINGE (ML) INJECTION PRN
Status: DISCONTINUED | OUTPATIENT
Start: 2024-09-03 | End: 2024-09-03 | Stop reason: HOSPADM

## 2024-09-03 RX ORDER — SODIUM CHLORIDE 0.9 % (FLUSH) 0.9 %
5-40 SYRINGE (ML) INJECTION EVERY 12 HOURS SCHEDULED
Status: DISCONTINUED | OUTPATIENT
Start: 2024-09-03 | End: 2024-09-03 | Stop reason: HOSPADM

## 2024-09-03 RX ORDER — PROPOFOL 10 MG/ML
INJECTION, EMULSION INTRAVENOUS CONTINUOUS PRN
Status: DISCONTINUED | OUTPATIENT
Start: 2024-09-03 | End: 2024-09-03 | Stop reason: SDUPTHER

## 2024-09-03 RX ORDER — LIDOCAINE HYDROCHLORIDE 10 MG/ML
INJECTION, SOLUTION EPIDURAL; INFILTRATION; INTRACAUDAL; PERINEURAL PRN
Status: DISCONTINUED | OUTPATIENT
Start: 2024-09-03 | End: 2024-09-03 | Stop reason: SDUPTHER

## 2024-09-03 RX ORDER — SODIUM CHLORIDE 9 MG/ML
INJECTION, SOLUTION INTRAVENOUS PRN
Status: DISCONTINUED | OUTPATIENT
Start: 2024-09-03 | End: 2024-09-03 | Stop reason: HOSPADM

## 2024-09-03 RX ORDER — PROPOFOL 10 MG/ML
INJECTION, EMULSION INTRAVENOUS PRN
Status: DISCONTINUED | OUTPATIENT
Start: 2024-09-03 | End: 2024-09-03 | Stop reason: SDUPTHER

## 2024-09-03 RX ORDER — SODIUM CHLORIDE, SODIUM LACTATE, POTASSIUM CHLORIDE, CALCIUM CHLORIDE 600; 310; 30; 20 MG/100ML; MG/100ML; MG/100ML; MG/100ML
INJECTION, SOLUTION INTRAVENOUS CONTINUOUS
Status: DISCONTINUED | OUTPATIENT
Start: 2024-09-03 | End: 2024-09-03 | Stop reason: HOSPADM

## 2024-09-03 RX ADMIN — PROPOFOL 100 MG: 10 INJECTION, EMULSION INTRAVENOUS at 13:17

## 2024-09-03 RX ADMIN — PROPOFOL 150 MCG/KG/MIN: 10 INJECTION, EMULSION INTRAVENOUS at 13:17

## 2024-09-03 RX ADMIN — LIDOCAINE HYDROCHLORIDE 25 MG: 10 INJECTION, SOLUTION EPIDURAL; INFILTRATION; INTRACAUDAL; PERINEURAL at 13:17

## 2024-09-03 RX ADMIN — SODIUM CHLORIDE, POTASSIUM CHLORIDE, SODIUM LACTATE AND CALCIUM CHLORIDE: 600; 310; 30; 20 INJECTION, SOLUTION INTRAVENOUS at 12:20

## 2024-09-03 ASSESSMENT — PAIN - FUNCTIONAL ASSESSMENT: PAIN_FUNCTIONAL_ASSESSMENT: NONE - DENIES PAIN

## 2024-09-03 NOTE — H&P
History and Physical    Pt Name: Xiomara Monroy  MRN: [id number]  YOB: 1950  Date of evaluation: 9/3/2024  Primary Care Physician: Adenike John APRN - CNP    SUBJECTIVE:   History of Chief Complaint:    Xiomara Monroy is a 74 y.o. female who is scheduled today for ENDOSCOPIC ULTRASOUND, PATHOLOGY. She reports having annual surveillance of a GIST and presents for that today. She reports she was diagnosed with GIST approximately 5 years ago. She denies any GI complaints.   Allergies  is allergic to allopurinol, atorvastatin, and morphine.  Medications  Prior to Admission medications    Medication Sig Start Date End Date Taking? Authorizing Provider   potassium citrate (UROCIT-K) 10 MEQ (1080 MG) extended release tablet  7/12/24  Yes ProviderLawrence MD   lisinopril-hydroCHLOROthiazide (PRINZIDE;ZESTORETIC) 20-12.5 MG per tablet Take 1 tablet by mouth daily 6/13/24  Yes Adenike John APRN - CNP   valACYclovir (VALTREX) 1 g tablet take 2 tablets by mouth AT ONSET repeat in 12 hours for OUTBREAKS...  (REFER TO PRESCRIPTION NOTES). 1/28/24  Yes Provider, MD Lawrence   cyclobenzaprine (FLEXERIL) 10 MG tablet Take 1 tablet by mouth 2 times daily as needed 10/12/23  Yes Provider, MD Lawrence   HYDROcodone-acetaminophen (NORCO) 5-325 MG per tablet take 1 tablet by mouth every 6 hours AS NEEDED FOR PAIN 12/27/22  Yes Provider, Historical, MD   Handicap Placard MISC by Does not apply route 1/24/23  Yes Adenike John APRN - CNP     Past Medical History    has a past medical history of Arthritis, Chronic back pain, COVID-19, Gastric nodule, GERD (gastroesophageal reflux disease), GIST (gastrointestinal stromal tumor), non-malignant, History of stomach ulcers, Hypertension, Kidney stones, Obesity, Pain, Under care of team, Under care of team, and Wellness examination.  Past Surgical History   has a past surgical history that includes Cholecystectomy, laparoscopic

## 2024-09-03 NOTE — DISCHARGE INSTRUCTIONS
[facility]    POST-ENDOSCOPY INSTRUCTIONS    1. ACTIVITY   No driving, operating machinery, or making important decisions for 24 hours.    Resume normal activity after 24 hours.  You may return to work after 24 hours.    2. DIET        Resume your usual diet unless specified below.   ***    3. MEDICATIONS    Resume your usual medications.     Do not consume alcohol, tranquilizers, or sleeping medications for 24 hour unless advised by your physician.                 4. PHYSICIAN FOLLOW-UP / INSTRUCTIONS    Please call the office/clinic in 10 days for biopsy results:      [  ] GI office:  [phone]          Follow up with your family physician as planned.    6. NORMAL CHANGES YOU MAY EXPERIENCE AFTER ENDOSCOPY:         EGD/EUS         Sore throat after EGD/EUS       A bloated feeling and belching from       air in stomach                You may feel fatigued for the next 24-48    hours due to the prep and sedation    7. CALL YOUR PHYSICIAN IF YOU EXPERIENCE ANY OF THE FOLLOWING      A.  Passing blood rectally or vomiting blood (color may be red or black)      B.  Severe abdominal pain or tenderness (that is not relieved by passing air)      C.  Fever, chills, or excessive sweating      D.  Persistent nausea or vomiting      E.  Redness or swelling at the IV site    If you have additional questions, PLEASE call your doctor or the [facility] GI Unit ([phone])

## 2024-09-03 NOTE — OP NOTE
Operative Note      Patient: Xiomara Monroy  YOB: 1950  MRN: [id number]    Date of Procedure: 9/3/2024    Pre-Op Diagnosis Codes:      * GIST, non-malignant [D21.4]    Post-Op Diagnosis:  Gastric GIST       Procedure(s):  ENDOSCOPIC ULTRASOUND, PATHOLOGY  ESOPHAGOGASTRODUODENOSCOPY    Surgeon(s):  Vick Mane MD    Assistant:   * No surgical staff found *    Anesthesia: General    Estimated Blood Loss (mL): Minimal    Complications: None    Specimens:   * No specimens in log *    Implants:  * No implants in log *      Drains: * No LDAs found *    Findings:  Infection Present At Time Of Surgery (PATOS) (choose all levels that have infection present):  No infection present        Description of Procedure:  Informed consent was obtained from the patient after explanation of the procedure including indications, description of the procedure,  benefits and possible risks and complications of the procedure, and alternatives. Questions were answered.  The patient's history was reviewed and a directed physical examination was performed prior to the procedure.    Patient was monitored throughout the procedure with pulse oximetry and periodic assessment of vital signs. Patient was sedated as noted above. With the patient in the left lateral decubitus position, the Olympus videoendoscope followed by endoechoendoscope was placed in the patient's mouth and under direct visualization passed into the esophagus. The scope was passed to the 2nd portion of the duodenum.  The patient tolerated the procedure well and was taken to the recovery area in good condition.    EGD Findings::   Esophagus: normal.   Stomach: A small to medium sized subepithelial nodule was seen in the fundus of the stomach.                     Exam of the stomach was otherwise within normal limit on direct and retroflexion view.  Duodenum: Normal    EUS findings:  Stomach: An oval, hypoechoic, homogeneous, solid mass measuring 8 x 11

## 2024-09-03 NOTE — ANESTHESIA PRE PROCEDURE
Department of Anesthesiology  Preprocedure Note       Name:  Xiomara Monroy   Age:  74 y.o.  :  1950                                          MRN:  [id number]         Date:  9/3/2024      Surgeon: Surgeon(s):  Vick Mane MD    Procedure: Procedure(s):  ENDOSCOPIC ULTRASOUND, PATHOLOGY    Medications prior to admission:   Prior to Admission medications    Medication Sig Start Date End Date Taking? Authorizing Provider   potassium citrate (UROCIT-K) 10 MEQ (1080 MG) extended release tablet  24   Lawrence Barnhart MD   lisinopril-hydroCHLOROthiazide (PRINZIDE;ZESTORETIC) 20-12.5 MG per tablet Take 1 tablet by mouth daily 24   Adenike John APRN - CNP   valACYclovir (VALTREX) 1 g tablet take 2 tablets by mouth AT ONSET repeat in 12 hours for OUTBREAKS...  (REFER TO PRESCRIPTION NOTES). 24   Lawrence Barnhart MD   cyclobenzaprine (FLEXERIL) 10 MG tablet Take 1 tablet by mouth 2 times daily as needed 10/12/23   Lawrence Barnhart MD   HYDROcodone-acetaminophen (NORCO) 5-325 MG per tablet take 1 tablet by mouth every 6 hours AS NEEDED FOR PAIN 22   Lawrence Barnhart MD   Handicap Placard MISC by Does not apply route 23   Adenike John APRN - CNP       Current medications:    No current facility-administered medications for this encounter.       Allergies:    Allergies   Allergen Reactions   • Allopurinol Other (See Comments)     \"made gout worse\"   • Atorvastatin Other (See Comments), Rash and Swelling   • Morphine Other (See Comments)     Does not like how it makes her feel       Problem List:    Patient Active Problem List   Diagnosis Code   • Essential hypertension I10   • Osteopenia M85.80   • Status post total replacement of left hip Z96.642   • Obesity (BMI 30-39.9) E66.9   • Hyperglycemia R73.9   • High triglycerides E78.1   • Stromal tumor of digestive system (HCC) C49.A0       Past Medical History:        Diagnosis Date   •

## 2024-09-06 NOTE — ANESTHESIA POSTPROCEDURE EVALUATION
POST- ANESTHESIA EVALUATION       Pt Name: Xiomara Monroy  MRN: [id number]  YOB: 1950  Date of evaluation: 9/6/2024  Time:  7:31 AM      BP (!) 157/94   Pulse 76   Temp 97.2 °F (36.2 °C) (Temporal)   Resp 19   SpO2 97%      Consciousness Level  Awake  Cardiopulmonary Status  Stable  Pain Adequately Treated YES  Nausea / Vomiting  NO  Adequate Hydration  YES  Anesthesia Related Complications NONE      Electronically signed by Joel Jones MD on 9/6/2024 at 7:31 AM     Department of Anesthesiology  Postprocedure Note    Patient: Xiomara Monroy  MRN: [id number]  YOB: 1950  Date of evaluation: 9/6/2024    Procedure Summary       Date: 09/03/24 Room / Location: [address]    Anesthesia Start: 1311 Anesthesia Stop: 1344    Procedures:       ENDOSCOPIC ULTRASOUND, PATHOLOGY      ESOPHAGOGASTRODUODENOSCOPY Diagnosis:       GIST, non-malignant      (GIST, non-malignant [D21.4])    Surgeons: Vick Mane MD Responsible Provider: Joel Jones MD    Anesthesia Type: MAC ASA Status: 2            Anesthesia Type: No value filed.    Jarad Phase I: Jarad Score: 10    Jarad Phase II: Jarad Score: 10    Anesthesia Post Evaluation    No notable events documented.  
No

## 2024-09-09 ENCOUNTER — OFFICE VISIT (OUTPATIENT)
Dept: UROLOGY | Age: 74
End: 2024-09-09
Payer: MEDICARE

## 2024-09-09 ENCOUNTER — HOSPITAL ENCOUNTER (OUTPATIENT)
Age: 74
Setting detail: SPECIMEN
Discharge: HOME | End: 2024-09-09

## 2024-09-09 VITALS
OXYGEN SATURATION: 97 % | BODY MASS INDEX: 35.82 KG/M2 | HEIGHT: 65 IN | TEMPERATURE: 97.7 F | DIASTOLIC BLOOD PRESSURE: 92 MMHG | HEART RATE: 98 BPM | SYSTOLIC BLOOD PRESSURE: 139 MMHG | WEIGHT: 215 LBS

## 2024-09-09 DIAGNOSIS — R30.0 DYSURIA: ICD-10-CM

## 2024-09-09 DIAGNOSIS — R35.0 URINARY FREQUENCY: Primary | ICD-10-CM

## 2024-09-09 DIAGNOSIS — R35.0 URINARY FREQUENCY: ICD-10-CM

## 2024-09-09 DIAGNOSIS — R39.15 URGENCY OF URINATION: ICD-10-CM

## 2024-09-09 DIAGNOSIS — R39.12 WEAK URINARY STREAM: ICD-10-CM

## 2024-09-09 PROCEDURE — G8399 PT W/DXA RESULTS DOCUMENT: HCPCS | Performed by: UROLOGY

## 2024-09-09 PROCEDURE — 3017F COLORECTAL CA SCREEN DOC REV: CPT | Performed by: UROLOGY

## 2024-09-09 PROCEDURE — G8417 CALC BMI ABV UP PARAM F/U: HCPCS | Performed by: UROLOGY

## 2024-09-09 PROCEDURE — 1090F PRES/ABSN URINE INCON ASSESS: CPT | Performed by: UROLOGY

## 2024-09-09 PROCEDURE — 1036F TOBACCO NON-USER: CPT | Performed by: UROLOGY

## 2024-09-09 PROCEDURE — 99214 OFFICE O/P EST MOD 30 MIN: CPT | Performed by: UROLOGY

## 2024-09-09 PROCEDURE — 3075F SYST BP GE 130 - 139MM HG: CPT | Performed by: UROLOGY

## 2024-09-09 PROCEDURE — 3080F DIAST BP >= 90 MM HG: CPT | Performed by: UROLOGY

## 2024-09-09 PROCEDURE — 1123F ACP DISCUSS/DSCN MKR DOCD: CPT | Performed by: UROLOGY

## 2024-09-09 PROCEDURE — G8427 DOCREV CUR MEDS BY ELIG CLIN: HCPCS | Performed by: UROLOGY

## 2024-09-09 RX ORDER — SULFAMETHOXAZOLE/TRIMETHOPRIM 800-160 MG
1 TABLET ORAL 2 TIMES DAILY
Qty: 20 TABLET | Refills: 0 | Status: SHIPPED | OUTPATIENT
Start: 2024-09-09 | End: 2024-09-19

## 2024-09-09 ASSESSMENT — ENCOUNTER SYMPTOMS
COUGH: 0
EYE PAIN: 0
VOMITING: 0
WHEEZING: 0
NAUSEA: 0
BACK PAIN: 0
CONSTIPATION: 0
DIARRHEA: 0
SHORTNESS OF BREATH: 0
ABDOMINAL PAIN: 0
EYE REDNESS: 0

## 2024-09-11 LAB
MICROORGANISM SPEC CULT: ABNORMAL
SERVICE CMNT-IMP: ABNORMAL
SPECIMEN DESCRIPTION: ABNORMAL

## 2024-09-12 RX ORDER — NITROFURANTOIN 25; 75 MG/1; MG/1
100 CAPSULE ORAL 2 TIMES DAILY
Qty: 14 CAPSULE | Refills: 0 | Status: SHIPPED | OUTPATIENT
Start: 2024-09-12 | End: 2024-09-19

## 2024-09-16 ENCOUNTER — PROCEDURE VISIT (OUTPATIENT)
Dept: UROLOGY | Age: 74
End: 2024-09-16
Payer: MEDICARE

## 2024-09-16 VITALS — HEART RATE: 94 BPM | SYSTOLIC BLOOD PRESSURE: 127 MMHG | TEMPERATURE: 97.2 F | DIASTOLIC BLOOD PRESSURE: 77 MMHG

## 2024-09-16 DIAGNOSIS — R39.14 FEELING OF INCOMPLETE BLADDER EMPTYING: Primary | ICD-10-CM

## 2024-09-16 DIAGNOSIS — R39.15 URGENCY OF URINATION: ICD-10-CM

## 2024-09-16 PROCEDURE — 64561 IMPLANT NEUROELECTRODES: CPT | Performed by: UROLOGY

## 2024-09-18 ENCOUNTER — TELEPHONE (OUTPATIENT)
Dept: UROLOGY | Age: 74
End: 2024-09-18

## 2024-09-19 ENCOUNTER — PROCEDURE VISIT (OUTPATIENT)
Dept: UROLOGY | Age: 74
End: 2024-09-19

## 2024-09-19 VITALS
OXYGEN SATURATION: 97 % | WEIGHT: 215 LBS | SYSTOLIC BLOOD PRESSURE: 138 MMHG | BODY MASS INDEX: 35.82 KG/M2 | DIASTOLIC BLOOD PRESSURE: 87 MMHG | HEIGHT: 65 IN | TEMPERATURE: 97.5 F | HEART RATE: 106 BPM

## 2024-09-19 DIAGNOSIS — R39.14 FEELING OF INCOMPLETE BLADDER EMPTYING: Primary | ICD-10-CM

## 2024-09-19 DIAGNOSIS — R35.0 URINARY FREQUENCY: ICD-10-CM

## 2024-09-19 DIAGNOSIS — R39.15 URGENCY OF URINATION: ICD-10-CM

## 2024-09-19 PROCEDURE — 99024 POSTOP FOLLOW-UP VISIT: CPT | Performed by: NURSE PRACTITIONER

## 2024-09-23 ENCOUNTER — TELEPHONE (OUTPATIENT)
Dept: UROLOGY | Age: 74
End: 2024-09-23

## 2024-09-23 ENCOUNTER — PROCEDURE VISIT (OUTPATIENT)
Dept: UROLOGY | Age: 74
End: 2024-09-23

## 2024-09-23 ENCOUNTER — HOSPITAL ENCOUNTER (OUTPATIENT)
Age: 74
Setting detail: SPECIMEN
Discharge: HOME | End: 2024-09-23

## 2024-09-23 VITALS
DIASTOLIC BLOOD PRESSURE: 87 MMHG | WEIGHT: 215 LBS | HEART RATE: 90 BPM | TEMPERATURE: 97.9 F | HEIGHT: 65 IN | BODY MASS INDEX: 35.82 KG/M2 | SYSTOLIC BLOOD PRESSURE: 138 MMHG

## 2024-09-23 DIAGNOSIS — R35.0 URINARY FREQUENCY: ICD-10-CM

## 2024-09-23 DIAGNOSIS — R39.15 URGENCY OF URINATION: Primary | ICD-10-CM

## 2024-09-23 DIAGNOSIS — N39.41 URGE URINARY INCONTINENCE: ICD-10-CM

## 2024-09-23 DIAGNOSIS — R39.15 URGENCY OF URINATION: ICD-10-CM

## 2024-09-23 PROCEDURE — 99024 POSTOP FOLLOW-UP VISIT: CPT | Performed by: NURSE PRACTITIONER

## 2024-09-23 RX ORDER — NITROFURANTOIN 25; 75 MG/1; MG/1
100 CAPSULE ORAL 2 TIMES DAILY
Qty: 14 CAPSULE | Refills: 0 | Status: SHIPPED | OUTPATIENT
Start: 2024-09-23 | End: 2024-09-24

## 2024-09-23 RX ORDER — SULFAMETHOXAZOLE/TRIMETHOPRIM 800-160 MG
1 TABLET ORAL 2 TIMES DAILY
Qty: 14 TABLET | Refills: 0 | Status: SHIPPED | OUTPATIENT
Start: 2024-09-23 | End: 2024-09-23

## 2024-09-24 LAB
MICROORGANISM SPEC CULT: NORMAL
SERVICE CMNT-IMP: NORMAL
SPECIMEN DESCRIPTION: NORMAL

## 2024-09-24 RX ORDER — CEPHALEXIN 500 MG/1
500 CAPSULE ORAL 3 TIMES DAILY
Qty: 15 CAPSULE | Refills: 0 | Status: SHIPPED | OUTPATIENT
Start: 2024-09-24 | End: 2024-09-29

## 2024-10-11 ENCOUNTER — OFFICE VISIT (OUTPATIENT)
Dept: FAMILY MEDICINE CLINIC | Age: 74
End: 2024-10-11

## 2024-10-11 VITALS
TEMPERATURE: 97.9 F | HEART RATE: 84 BPM | SYSTOLIC BLOOD PRESSURE: 154 MMHG | OXYGEN SATURATION: 98 % | DIASTOLIC BLOOD PRESSURE: 94 MMHG

## 2024-10-11 DIAGNOSIS — J01.90 ACUTE VIRAL SINUSITIS: Primary | ICD-10-CM

## 2024-10-11 DIAGNOSIS — B97.89 ACUTE VIRAL SINUSITIS: Primary | ICD-10-CM

## 2024-10-11 RX ORDER — AZELASTINE 1 MG/ML
2 SPRAY, METERED NASAL 2 TIMES DAILY
Qty: 60 ML | Refills: 0 | Status: CANCELLED | OUTPATIENT
Start: 2024-10-11

## 2024-10-11 RX ORDER — LORATADINE 10 MG/1
10 TABLET ORAL DAILY
Qty: 15 TABLET | Refills: 0 | Status: CANCELLED | OUTPATIENT
Start: 2024-10-11 | End: 2024-10-26

## 2024-10-11 ASSESSMENT — ENCOUNTER SYMPTOMS
HOARSE VOICE: 1
SINUS PRESSURE: 1
EYE ITCHING: 0
RHINORRHEA: 1
TROUBLE SWALLOWING: 0
SORE THROAT: 1
EYE REDNESS: 0
SINUS COMPLAINT: 1
EYE PAIN: 0
VOICE CHANGE: 1
SINUS PAIN: 1
COUGH: 0
SHORTNESS OF BREATH: 0

## 2024-10-11 NOTE — PROGRESS NOTES
[facility] PHYSICIANS [facility], [facility] FAM[facility]IC[facility]  [address]  [address]  [facility] [address]  Dept: [phone]  Dept Fax: [phone]    Xiomara Monroy is a 74 y.o. female who presents to the urgent care today for her medical conditions/complaints as notedbelow.  Xiomara Monroy is c/o of Sinus Problem (Onset this morning PND, burning in nose, throat  irritation and headache.)      HPI:     Patient presents to the Walk In Clinic for evaluation of sinus congestion with PND, onset this morning. Patient is inquiring about antibiotic to get ahead of the illness.     Hx of sinusitis, followed with ENT in the past    Patient Care Team:  Adenike John APRN - CNP as PCP - General (Certified Nurse Practitioner)  Adenike John APRN - CNP as PCP - Empaneled Provider  Adenike John APRN - CNP as Referring Physician (Certified Nurse Practitioner)  Sander Anders MD as Consulting Physician (Urology)      Sinus Problem  This is a new problem. The current episode started today (about 8 hours ago.). The problem has been gradually worsening since onset. There has been no fever. The pain is moderate. Associated symptoms include congestion, headaches, a hoarse voice, sinus pressure and a sore throat (irritated). Pertinent negatives include no coughing, diaphoresis, ear pain, neck pain, shortness of breath or sneezing. Treatments tried: allegra, saline spray. The treatment provided no relief.       Past Medical History:   Diagnosis Date    Arthritis     osteoarthritis    Chronic back pain     COVID-19 2020    tiredness X 7 days. No treatment or hospitalization.    Gastric nodule     patient reports    GERD (gastroesophageal reflux disease)     GIST (gastrointestinal stromal tumor), non-malignant     History of stomach ulcers     Hypertension     Kidney stones     Obesity     Pain     R/L kidney/back    Under care of team     Urology

## 2024-10-14 ENCOUNTER — HOSPITAL ENCOUNTER (OUTPATIENT)
Dept: CT IMAGING | Age: 74
Discharge: HOME | End: 2024-10-16
Attending: UROLOGY
Payer: MEDICARE

## 2024-10-14 ENCOUNTER — OFFICE VISIT (OUTPATIENT)
Dept: UROLOGY | Age: 74
End: 2024-10-14
Payer: MEDICARE

## 2024-10-14 VITALS
HEIGHT: 65 IN | TEMPERATURE: 97.3 F | HEART RATE: 104 BPM | BODY MASS INDEX: 35.82 KG/M2 | OXYGEN SATURATION: 96 % | DIASTOLIC BLOOD PRESSURE: 77 MMHG | SYSTOLIC BLOOD PRESSURE: 140 MMHG | WEIGHT: 215 LBS

## 2024-10-14 DIAGNOSIS — N20.0 KIDNEY STONE: Primary | ICD-10-CM

## 2024-10-14 DIAGNOSIS — R10.9 FLANK PAIN: ICD-10-CM

## 2024-10-14 DIAGNOSIS — N20.0 KIDNEY STONE: ICD-10-CM

## 2024-10-14 DIAGNOSIS — N39.41 URGE URINARY INCONTINENCE: ICD-10-CM

## 2024-10-14 PROCEDURE — 99214 OFFICE O/P EST MOD 30 MIN: CPT | Performed by: UROLOGY

## 2024-10-14 PROCEDURE — 3017F COLORECTAL CA SCREEN DOC REV: CPT | Performed by: UROLOGY

## 2024-10-14 PROCEDURE — 3077F SYST BP >= 140 MM HG: CPT | Performed by: UROLOGY

## 2024-10-14 PROCEDURE — G8417 CALC BMI ABV UP PARAM F/U: HCPCS | Performed by: UROLOGY

## 2024-10-14 PROCEDURE — 3078F DIAST BP <80 MM HG: CPT | Performed by: UROLOGY

## 2024-10-14 PROCEDURE — 1036F TOBACCO NON-USER: CPT | Performed by: UROLOGY

## 2024-10-14 PROCEDURE — G8482 FLU IMMUNIZE ORDER/ADMIN: HCPCS | Performed by: UROLOGY

## 2024-10-14 PROCEDURE — 1090F PRES/ABSN URINE INCON ASSESS: CPT | Performed by: UROLOGY

## 2024-10-14 PROCEDURE — G8399 PT W/DXA RESULTS DOCUMENT: HCPCS | Performed by: UROLOGY

## 2024-10-14 PROCEDURE — 74176 CT ABD & PELVIS W/O CONTRAST: CPT

## 2024-10-14 PROCEDURE — G8427 DOCREV CUR MEDS BY ELIG CLIN: HCPCS | Performed by: UROLOGY

## 2024-10-14 PROCEDURE — 0509F URINE INCON PLAN DOCD: CPT | Performed by: UROLOGY

## 2024-10-14 PROCEDURE — 1123F ACP DISCUSS/DSCN MKR DOCD: CPT | Performed by: UROLOGY

## 2024-10-14 ASSESSMENT — ENCOUNTER SYMPTOMS
CONSTIPATION: 0
DIARRHEA: 0
SHORTNESS OF BREATH: 0
WHEEZING: 0
EYE REDNESS: 0
BACK PAIN: 0
NAUSEA: 0
ABDOMINAL PAIN: 0
VOMITING: 0
COUGH: 0
EYE PAIN: 0

## 2024-10-14 NOTE — PROGRESS NOTES
Review of Systems   Constitutional:  Negative for chills, fatigue and fever.   Eyes:  Negative for pain, redness and visual disturbance.   Respiratory:  Negative for cough, shortness of breath and wheezing.    Cardiovascular:  Negative for chest pain and leg swelling.   Gastrointestinal:  Negative for abdominal pain, constipation, diarrhea, nausea and vomiting.   Genitourinary:  Positive for flank pain. Negative for difficulty urinating, dysuria, frequency, hematuria and urgency.   Musculoskeletal:  Negative for back pain, joint swelling and myalgias.   Skin:  Negative for rash and wound.   Neurological:  Negative for dizziness, tremors, weakness and numbness.   Hematological:  Does not bruise/bleed easily.     
Order Specific Question:   Reason for exam:     Answer:   flank pain, kidney stone            Sander Anders MD    Agree with the ROS entered by the MA.

## 2024-10-21 ENCOUNTER — OFFICE VISIT (OUTPATIENT)
Dept: UROLOGY | Age: 74
End: 2024-10-21
Payer: MEDICARE

## 2024-10-21 VITALS
WEIGHT: 215 LBS | DIASTOLIC BLOOD PRESSURE: 84 MMHG | OXYGEN SATURATION: 99 % | HEART RATE: 99 BPM | TEMPERATURE: 97.8 F | SYSTOLIC BLOOD PRESSURE: 138 MMHG | HEIGHT: 65 IN | BODY MASS INDEX: 35.82 KG/M2

## 2024-10-21 DIAGNOSIS — N39.41 URGE URINARY INCONTINENCE: ICD-10-CM

## 2024-10-21 DIAGNOSIS — R10.9 FLANK PAIN: ICD-10-CM

## 2024-10-21 DIAGNOSIS — R35.0 URINARY FREQUENCY: ICD-10-CM

## 2024-10-21 DIAGNOSIS — R39.15 URGENCY OF URINATION: ICD-10-CM

## 2024-10-21 DIAGNOSIS — N20.0 KIDNEY STONE: Primary | ICD-10-CM

## 2024-10-21 PROCEDURE — 3017F COLORECTAL CA SCREEN DOC REV: CPT | Performed by: UROLOGY

## 2024-10-21 PROCEDURE — 0509F URINE INCON PLAN DOCD: CPT | Performed by: UROLOGY

## 2024-10-21 PROCEDURE — G8399 PT W/DXA RESULTS DOCUMENT: HCPCS | Performed by: UROLOGY

## 2024-10-21 PROCEDURE — G8482 FLU IMMUNIZE ORDER/ADMIN: HCPCS | Performed by: UROLOGY

## 2024-10-21 PROCEDURE — 99214 OFFICE O/P EST MOD 30 MIN: CPT | Performed by: UROLOGY

## 2024-10-21 PROCEDURE — 3075F SYST BP GE 130 - 139MM HG: CPT | Performed by: UROLOGY

## 2024-10-21 PROCEDURE — 1090F PRES/ABSN URINE INCON ASSESS: CPT | Performed by: UROLOGY

## 2024-10-21 PROCEDURE — 3079F DIAST BP 80-89 MM HG: CPT | Performed by: UROLOGY

## 2024-10-21 PROCEDURE — 1123F ACP DISCUSS/DSCN MKR DOCD: CPT | Performed by: UROLOGY

## 2024-10-21 PROCEDURE — G8427 DOCREV CUR MEDS BY ELIG CLIN: HCPCS | Performed by: UROLOGY

## 2024-10-21 PROCEDURE — G8417 CALC BMI ABV UP PARAM F/U: HCPCS | Performed by: UROLOGY

## 2024-10-21 PROCEDURE — 1036F TOBACCO NON-USER: CPT | Performed by: UROLOGY

## 2024-10-21 ASSESSMENT — ENCOUNTER SYMPTOMS
BACK PAIN: 1
NAUSEA: 0
CONSTIPATION: 0
VOMITING: 0
ABDOMINAL PAIN: 0
EYE PAIN: 0
COUGH: 0
DIARRHEA: 0
WHEEZING: 0
SHORTNESS OF BREATH: 0
EYE REDNESS: 0

## 2024-10-21 NOTE — PROGRESS NOTES
Review of Systems   Constitutional:  Negative for chills, fatigue and fever.   Eyes:  Negative for pain, redness and visual disturbance.   Respiratory:  Negative for cough, shortness of breath and wheezing.    Cardiovascular:  Negative for chest pain and leg swelling.   Gastrointestinal:  Negative for abdominal pain, constipation, diarrhea, nausea and vomiting.   Genitourinary:  Negative for difficulty urinating, dysuria, flank pain, frequency, hematuria and urgency.   Musculoskeletal:  Positive for back pain (Left side). Negative for joint swelling and myalgias.   Skin:  Negative for rash and wound.   Neurological:  Negative for dizziness, tremors, weakness and numbness.   Hematological:  Does not bruise/bleed easily.     
    lisinopril-hydroCHLOROthiazide (PRINZIDE;ZESTORETIC) 20-12.5 MG per tablet Take 1 tablet by mouth daily 90 tablet 1    valACYclovir (VALTREX) 1 g tablet take 2 tablets by mouth AT ONSET repeat in 12 hours for OUTBREAKS...  (REFER TO PRESCRIPTION NOTES).      cyclobenzaprine (FLEXERIL) 10 MG tablet Take 1 tablet by mouth 2 times daily as needed      HYDROcodone-acetaminophen (NORCO) 5-325 MG per tablet take 1 tablet by mouth every 6 hours AS NEEDED FOR PAIN      Handicap Placard MISC by Does not apply route 1 each 0      (All medications reviewed and updated by provider sincelast office visit or hospitalization)   Allopurinol, Atorvastatin, Macrobid [nitrofurantoin], and Morphine  Social History     Tobacco Use   Smoking Status Never    Passive exposure: Past   Smokeless Tobacco Never      (If patient a smoker, smoking cessation counseling offered)     Social History     Substance and Sexual Activity   Alcohol Use Yes    Alcohol/week: 4.0 standard drinks of alcohol    Types: 4 Glasses of wine per week    Comment: 2 drinks weekly       REVIEW OF SYSTEMS:  Review of Systems      Physical Exam:      Vitals:    10/21/24 1256   BP: 138/84   Pulse: 99   Temp: 97.8 °F (36.6 °C)   SpO2: 99%     Body mass index is 35.78 kg/m².  Patient is a 74 y.o. female in noacute distress and alert and oriented to person, place and time.  Physical Exam  Constitutional: Patient in no acute distress.  Neuro: Alert andoriented to person, place and time.  Psych: Mood normal, affect normal  Skin: No rash noted        and Plan      1. Kidney stone    2. Urge urinary incontinence    3. Urgency of urination    4. Urinary frequency    5. Flank pain           Plan:   Cysto left urs hll stent st v's  We will postpone InterStim until after above.  I did explain to patient that it is a nonobstructing stone but she is having significant symptoms and we will treat the stone to eliminate this variable as a possible cause for her significant left flank

## 2024-10-22 ENCOUNTER — ANESTHESIA EVENT (OUTPATIENT)
Dept: OPERATING ROOM | Age: 74
End: 2024-10-22
Payer: MEDICARE

## 2024-10-22 ENCOUNTER — TELEPHONE (OUTPATIENT)
Dept: UROLOGY | Age: 74
End: 2024-10-22

## 2024-10-22 NOTE — H&P
Additional Contrast? None    Result Date: 10/14/2024  EXAMINATION: CT OF THE ABDOMEN AND PELVIS WITHOUT CONTRAST 10/14/2024 11:44 am TECHNIQUE: CT of the abdomen and pelvis was performed without the administration of intravenous contrast. Multiplanar reformatted images are provided for review. Automated exposure control, iterative reconstruction, and/or weight based adjustment of the mA/kV was utilized to reduce the radiation dose to as low as reasonably achievable. COMPARISON: 09/05/2023 HISTORY: ORDERING SYSTEM PROVIDED HISTORY: Kidney stone TECHNOLOGIST PROVIDED HISTORY: flank pain, kidney stone Reason for Exam: flank pain, kidney stone, hx kidney stone Additional signs and symptoms: Pt c/o left flank pain x 2 weeks, no hematuria Relevant Medical/Surgical History: Hx cholecystectomy FINDINGS: Lower Chest: The visualized heart and lungs show no acute abnormalities. Organs: Cholecystectomy.  Liver, spleen, pancreas and adrenal glands show no significant abnormalities. 2 mm nonobstructing calculus lower pole right kidney and punctate calculus in the midpole left kidney.  Mild right hydronephrosis.  No right ureteric calculus.  Visualized left ureter unremarkable.  There is a portion of the distal left ureter as it approaches the urinary bladder which is obscured by streak artifact from left hip prosthesis.  There is a calcification close to the urinary bladder however this is unchanged from 2023 and is probably a phlebolith. GI/Bowel: There is limited evaluation due to absence of oral contrast. Stomach grossly normal.  No evidence for bowel obstruction no acute infective process.  Normal appendix with appendicoliths noted. Pelvis: Visualized uterus and urinary bladder unremarkable.  Streak artifact from left hip prosthesis obscures portions of the pelvis. Peritoneum/Retroperitoneum: No free fluid or significant lymphadenopathy. Bones/Soft Tissues: Degenerative changes.     1. A 2 mm right and punctate left

## 2024-10-22 NOTE — TELEPHONE ENCOUNTER
Contacted/spoke with patient. Patient informed of time change for procedure on 10/23/24. Patient verbalizes understanding and call was ended.     Arrival: 10:45am  Procedure Time: 12:45pm

## 2024-10-23 ENCOUNTER — HOSPITAL ENCOUNTER (OUTPATIENT)
Age: 74
Setting detail: OUTPATIENT SURGERY
Discharge: HOME | End: 2024-10-23
Attending: UROLOGY | Admitting: UROLOGY
Payer: MEDICARE

## 2024-10-23 ENCOUNTER — APPOINTMENT (OUTPATIENT)
Dept: GENERAL RADIOLOGY | Age: 74
End: 2024-10-23
Attending: UROLOGY
Payer: MEDICARE

## 2024-10-23 ENCOUNTER — ANESTHESIA (OUTPATIENT)
Dept: OPERATING ROOM | Age: 74
End: 2024-10-23
Payer: MEDICARE

## 2024-10-23 VITALS
WEIGHT: 215 LBS | OXYGEN SATURATION: 93 % | DIASTOLIC BLOOD PRESSURE: 98 MMHG | SYSTOLIC BLOOD PRESSURE: 163 MMHG | HEART RATE: 82 BPM | TEMPERATURE: 96.8 F | HEIGHT: 65 IN | RESPIRATION RATE: 15 BRPM | BODY MASS INDEX: 35.82 KG/M2

## 2024-10-23 LAB
BUN BLD-MCNC: 25 MG/DL (ref 8–26)
CA-I BLD-SCNC: 1.36 MMOL/L (ref 1.15–1.33)
CHLORIDE BLD-SCNC: 101 MMOL/L (ref 98–107)
CO2 BLD CALC-SCNC: 27 MMOL/L (ref 22–30)
EGFR, POC: 78 ML/MIN/1.73M2
GLUCOSE BLD-MCNC: 95 MG/DL (ref 74–100)
HCT VFR BLD AUTO: 52 % (ref 36–46)
POC ANION GAP: 9 MMOL/L (ref 7–16)
POC CREATININE: 0.8 MG/DL (ref 0.51–1.19)
POC HEMOGLOBIN (CALC): 17.6 G/DL (ref 12–16)
POTASSIUM BLD-SCNC: 4 MMOL/L (ref 3.5–4.5)
SODIUM BLD-SCNC: 136 MMOL/L (ref 138–146)

## 2024-10-23 PROCEDURE — C2617 STENT, NON-COR, TEM W/O DEL: HCPCS | Performed by: UROLOGY

## 2024-10-23 PROCEDURE — 80051 ELECTROLYTE PANEL: CPT

## 2024-10-23 PROCEDURE — 7100000011 HC PHASE II RECOVERY - ADDTL 15 MIN: Performed by: UROLOGY

## 2024-10-23 PROCEDURE — 82330 ASSAY OF CALCIUM: CPT

## 2024-10-23 PROCEDURE — 3600000013 HC SURGERY LEVEL 3 ADDTL 15MIN: Performed by: UROLOGY

## 2024-10-23 PROCEDURE — 93005 ELECTROCARDIOGRAM TRACING: CPT | Performed by: ANESTHESIOLOGY

## 2024-10-23 PROCEDURE — 6360000002 HC RX W HCPCS: Performed by: PHYSICIAN ASSISTANT

## 2024-10-23 PROCEDURE — 7100000001 HC PACU RECOVERY - ADDTL 15 MIN: Performed by: UROLOGY

## 2024-10-23 PROCEDURE — 6360000004 HC RX CONTRAST MEDICATION: Performed by: UROLOGY

## 2024-10-23 PROCEDURE — 3700000000 HC ANESTHESIA ATTENDED CARE: Performed by: UROLOGY

## 2024-10-23 PROCEDURE — 85014 HEMATOCRIT: CPT

## 2024-10-23 PROCEDURE — 3600000003 HC SURGERY LEVEL 3 BASE: Performed by: UROLOGY

## 2024-10-23 PROCEDURE — 82565 ASSAY OF CREATININE: CPT

## 2024-10-23 PROCEDURE — 6360000002 HC RX W HCPCS: Performed by: NURSE ANESTHETIST, CERTIFIED REGISTERED

## 2024-10-23 PROCEDURE — 7100000010 HC PHASE II RECOVERY - FIRST 15 MIN: Performed by: UROLOGY

## 2024-10-23 PROCEDURE — 2580000003 HC RX 258: Performed by: UROLOGY

## 2024-10-23 PROCEDURE — 7100000000 HC PACU RECOVERY - FIRST 15 MIN: Performed by: UROLOGY

## 2024-10-23 PROCEDURE — 82947 ASSAY GLUCOSE BLOOD QUANT: CPT

## 2024-10-23 PROCEDURE — 2709999900 HC NON-CHARGEABLE SUPPLY: Performed by: UROLOGY

## 2024-10-23 PROCEDURE — C1747 HC ENDOSCOPE, SINGLE, URINARY TRACT: HCPCS | Performed by: UROLOGY

## 2024-10-23 PROCEDURE — 2580000003 HC RX 258

## 2024-10-23 PROCEDURE — C1769 GUIDE WIRE: HCPCS | Performed by: UROLOGY

## 2024-10-23 PROCEDURE — 84520 ASSAY OF UREA NITROGEN: CPT

## 2024-10-23 PROCEDURE — 3700000001 HC ADD 15 MINUTES (ANESTHESIA): Performed by: UROLOGY

## 2024-10-23 PROCEDURE — 2500000003 HC RX 250 WO HCPCS

## 2024-10-23 PROCEDURE — 6360000002 HC RX W HCPCS

## 2024-10-23 DEVICE — URETERAL STENT
Type: IMPLANTABLE DEVICE | Site: URETER | Status: FUNCTIONAL
Brand: POLARIS™ ULTRA

## 2024-10-23 RX ORDER — SODIUM CHLORIDE 0.9 % (FLUSH) 0.9 %
5-40 SYRINGE (ML) INJECTION EVERY 12 HOURS SCHEDULED
Status: CANCELLED | OUTPATIENT
Start: 2024-10-23

## 2024-10-23 RX ORDER — SODIUM CHLORIDE, SODIUM LACTATE, POTASSIUM CHLORIDE, CALCIUM CHLORIDE 600; 310; 30; 20 MG/100ML; MG/100ML; MG/100ML; MG/100ML
INJECTION, SOLUTION INTRAVENOUS
Status: DISCONTINUED | OUTPATIENT
Start: 2024-10-23 | End: 2024-10-23 | Stop reason: SDUPTHER

## 2024-10-23 RX ORDER — MIDAZOLAM HYDROCHLORIDE 2 MG/2ML
1 INJECTION, SOLUTION INTRAMUSCULAR; INTRAVENOUS EVERY 10 MIN PRN
Status: CANCELLED | OUTPATIENT
Start: 2024-10-23

## 2024-10-23 RX ORDER — TAMSULOSIN HYDROCHLORIDE 0.4 MG/1
0.4 CAPSULE ORAL DAILY
Qty: 7 CAPSULE | Refills: 0 | Status: SHIPPED | OUTPATIENT
Start: 2024-10-23 | End: 2024-10-30

## 2024-10-23 RX ORDER — FENTANYL CITRATE 50 UG/ML
25 INJECTION, SOLUTION INTRAMUSCULAR; INTRAVENOUS EVERY 5 MIN PRN
Status: DISCONTINUED | OUTPATIENT
Start: 2024-10-23 | End: 2024-10-23 | Stop reason: HOSPADM

## 2024-10-23 RX ORDER — SODIUM CHLORIDE 0.9 % (FLUSH) 0.9 %
5-40 SYRINGE (ML) INJECTION PRN
Status: DISCONTINUED | OUTPATIENT
Start: 2024-10-23 | End: 2024-10-23 | Stop reason: HOSPADM

## 2024-10-23 RX ORDER — PROPOFOL 10 MG/ML
INJECTION, EMULSION INTRAVENOUS
Status: DISCONTINUED | OUTPATIENT
Start: 2024-10-23 | End: 2024-10-23 | Stop reason: SDUPTHER

## 2024-10-23 RX ORDER — FENTANYL CITRATE 50 UG/ML
50 INJECTION, SOLUTION INTRAMUSCULAR; INTRAVENOUS EVERY 5 MIN PRN
Status: DISCONTINUED | OUTPATIENT
Start: 2024-10-23 | End: 2024-10-23 | Stop reason: HOSPADM

## 2024-10-23 RX ORDER — SODIUM CHLORIDE 0.9 % (FLUSH) 0.9 %
5-40 SYRINGE (ML) INJECTION PRN
Status: CANCELLED | OUTPATIENT
Start: 2024-10-23

## 2024-10-23 RX ORDER — LIDOCAINE HYDROCHLORIDE 10 MG/ML
INJECTION, SOLUTION EPIDURAL; INFILTRATION; INTRACAUDAL; PERINEURAL
Status: DISCONTINUED | OUTPATIENT
Start: 2024-10-23 | End: 2024-10-23 | Stop reason: SDUPTHER

## 2024-10-23 RX ORDER — MAGNESIUM HYDROXIDE 1200 MG/15ML
LIQUID ORAL CONTINUOUS PRN
Status: DISCONTINUED | OUTPATIENT
Start: 2024-10-23 | End: 2024-10-23 | Stop reason: HOSPADM

## 2024-10-23 RX ORDER — SODIUM CHLORIDE 9 MG/ML
INJECTION, SOLUTION INTRAVENOUS PRN
Status: CANCELLED | OUTPATIENT
Start: 2024-10-23

## 2024-10-23 RX ORDER — SODIUM CHLORIDE 0.9 % (FLUSH) 0.9 %
5-40 SYRINGE (ML) INJECTION EVERY 12 HOURS SCHEDULED
Status: DISCONTINUED | OUTPATIENT
Start: 2024-10-23 | End: 2024-10-23 | Stop reason: HOSPADM

## 2024-10-23 RX ORDER — DEXAMETHASONE SODIUM PHOSPHATE 10 MG/ML
INJECTION, SOLUTION INTRAMUSCULAR; INTRAVENOUS
Status: DISCONTINUED | OUTPATIENT
Start: 2024-10-23 | End: 2024-10-23 | Stop reason: SDUPTHER

## 2024-10-23 RX ORDER — CEFADROXIL 500 MG/1
500 CAPSULE ORAL 2 TIMES DAILY
Qty: 6 CAPSULE | Refills: 0 | Status: SHIPPED | OUTPATIENT
Start: 2024-10-23 | End: 2024-10-26

## 2024-10-23 RX ORDER — OXYBUTYNIN CHLORIDE 10 MG/1
10 TABLET, EXTENDED RELEASE ORAL DAILY
Qty: 5 TABLET | Refills: 0 | Status: SHIPPED | OUTPATIENT
Start: 2024-10-23 | End: 2024-10-28

## 2024-10-23 RX ORDER — FENTANYL CITRATE 50 UG/ML
INJECTION, SOLUTION INTRAMUSCULAR; INTRAVENOUS
Status: DISCONTINUED | OUTPATIENT
Start: 2024-10-23 | End: 2024-10-23 | Stop reason: SDUPTHER

## 2024-10-23 RX ORDER — ONDANSETRON 2 MG/ML
4 INJECTION INTRAMUSCULAR; INTRAVENOUS
Status: DISCONTINUED | OUTPATIENT
Start: 2024-10-23 | End: 2024-10-23 | Stop reason: HOSPADM

## 2024-10-23 RX ORDER — SODIUM CHLORIDE 9 MG/ML
INJECTION, SOLUTION INTRAVENOUS PRN
Status: DISCONTINUED | OUTPATIENT
Start: 2024-10-23 | End: 2024-10-23 | Stop reason: HOSPADM

## 2024-10-23 RX ORDER — ONDANSETRON 2 MG/ML
INJECTION INTRAMUSCULAR; INTRAVENOUS
Status: DISCONTINUED | OUTPATIENT
Start: 2024-10-23 | End: 2024-10-23 | Stop reason: SDUPTHER

## 2024-10-23 RX ORDER — NALOXONE HYDROCHLORIDE 0.4 MG/ML
INJECTION, SOLUTION INTRAMUSCULAR; INTRAVENOUS; SUBCUTANEOUS PRN
Status: DISCONTINUED | OUTPATIENT
Start: 2024-10-23 | End: 2024-10-23 | Stop reason: HOSPADM

## 2024-10-23 RX ADMIN — FENTANYL CITRATE 50 MCG: 50 INJECTION, SOLUTION INTRAMUSCULAR; INTRAVENOUS at 14:00

## 2024-10-23 RX ADMIN — FENTANYL CITRATE 25 MCG: 50 INJECTION, SOLUTION INTRAMUSCULAR; INTRAVENOUS at 13:41

## 2024-10-23 RX ADMIN — DEXAMETHASONE SODIUM PHOSPHATE 8 MG: 10 INJECTION, SOLUTION INTRAMUSCULAR; INTRAVENOUS at 13:46

## 2024-10-23 RX ADMIN — PROPOFOL 200 MG: 10 INJECTION, EMULSION INTRAVENOUS at 13:38

## 2024-10-23 RX ADMIN — FENTANYL CITRATE 50 MCG: 50 INJECTION, SOLUTION INTRAMUSCULAR; INTRAVENOUS at 13:53

## 2024-10-23 RX ADMIN — SODIUM CHLORIDE, POTASSIUM CHLORIDE, SODIUM LACTATE AND CALCIUM CHLORIDE: 600; 310; 30; 20 INJECTION, SOLUTION INTRAVENOUS at 13:32

## 2024-10-23 RX ADMIN — FENTANYL CITRATE 25 MCG: 50 INJECTION, SOLUTION INTRAMUSCULAR; INTRAVENOUS at 13:49

## 2024-10-23 RX ADMIN — ONDANSETRON 4 MG: 2 INJECTION INTRAMUSCULAR; INTRAVENOUS at 14:14

## 2024-10-23 RX ADMIN — FENTANYL CITRATE 50 MCG: 50 INJECTION, SOLUTION INTRAMUSCULAR; INTRAVENOUS at 14:03

## 2024-10-23 RX ADMIN — Medication 2000 MG: at 13:53

## 2024-10-23 RX ADMIN — LIDOCAINE HYDROCHLORIDE 50 MG: 10 INJECTION, SOLUTION EPIDURAL; INFILTRATION; INTRACAUDAL; PERINEURAL at 13:38

## 2024-10-23 ASSESSMENT — PAIN - FUNCTIONAL ASSESSMENT: PAIN_FUNCTIONAL_ASSESSMENT: NONE - DENIES PAIN

## 2024-10-23 NOTE — OP NOTE
Operative Note      Patient: Xiomara Monroy  YOB: 1950  MRN: [id number]    Date of Procedure: 10/23/2024    Pre-Op Diagnosis Codes:      * Kidney stone [N20.0]    Post-Op Diagnosis: Same       Procedures:  Cystoscopy  Left ureteroscopy  Left retrograde pyelogram  Left ureteral stent insertion (6 Italian by 26 cm)    Surgeon(s):  Sander Anders MD    Assistant:   Resident: Dania Morgan MD; Tong Leal MD    Anesthesia: General    Estimated Blood Loss (mL): Minimal    Complications: None    Specimens:   * No specimens in log *    Implants:  Implant Name Type Inv. Item Serial No.  Lot No. LRB No. Used Action   STENT URET 6FR L26CM PERCFLX HYDR+ DBL PGTL THRD 2 - [id number]  STENT URET 6FR L26CM PERCFLX HYDR+ DBL PGTL THRD 2  [employer] [facility] UROLOGY-WD [id number] Left 1 Implanted         Drains: * No LDAs found *    Findings:  Infection Present At Time Of Surgery (PATOS) (choose all levels that have infection present):  No infection present  Other Findings: No stones identified in the left renal collecting system    Indications for procedure:  Patient is a 74-year-old female with a history of bilateral nephrolithiasis.  About a week or so ago patient started developing severe left-sided flank pain.  CT abdomen/pelvis showed nonobstructing bilateral renal stones, which were less than 2 mm in size.  We discussed diet the stones could be treated.  Risk, benefits, alternatives were discussed the patient but informed consent was obtained.      DETAILS OF THE PROCEDURE:  The patient was correctly identified in the preoperative holding area.  She was brought back to the operating room and General anesthesia was administered.  She was then prepped and draped in the usual sterile fashion in the dorsal lithotomy position. EPC cuffs were on, in place, and fully functional.  She was given 2gm ancef IV  for antibiotic prophylaxis.  After appropriate time-out was performed with all parties agreeing,  a 22 Fr cystoscope with a 30 degree lens was inserted into the bladder.   Bladder was inspected including the dome posterior and lateral walls as well as the anterior wall and trigone.  These were found to be free of tumors or mucosal abnormalities.      We identified the left ureteral orifice.  We cannulated with a 0.035 Glidewire.  Using a dual-lumen catheter we advanced a second Glidewire in the left renal collecting system.  Placement was confirmed with fluoroscopy.  Dual-lumen catheter was then removed.  Over the working wire a flexible ureteroscope was advanced into the ureter was advanced at the level of the left renal collecting system.  Pan pyeloscopy was performed, however, no stones were identified.  We performed a left retrograde pyelogram and abnormal filling defects were seen.  Repeat pan pyeloscopy was performed and no stones were identified.  We then withdrew the ureteroscope slowly and did not identify any stones in the ureter.  The ureter was free of trauma.  Over the safety wire a 6 Serbian by 26 cm double-J ureteral stent was advanced up in the left renal collecting system.  Placement was confirmed with fluoroscopy.  Wire was then removed and there was appropriate placement within the left renal pelvis and the bladder.  The stent was left with strings.  These were attached to the inner left thigh of the patient with benzoin and Steri-Strips.    Dr. Anders was scrubbed and present for the procedure.     The patient tolerated the procedure well and was sent to PACU for postoperative monitoring.    DISPOSITION:  The patient was discharged home in stable condition She was instructed to remove the stent via the externalized string in 3 days.     Dania Morgan MD  Urology Resident, PGY-3

## 2024-10-23 NOTE — DISCHARGE INSTRUCTIONS
No alcoholic beverages, no driving or operating machinery, no making important decisions for 24 hours.   You may have a normal diet but should eat lightly day of surgery.  Drink plenty of fluids.  Urinate within 8 hours after surgery, if unable to urinate call your doctor            Ureteroscopy Discharge Instructions:    Take prescriptions as directed, ensuring you take entire course of antibiotic.  No driving while taking narcotic pain medication. Wean off narcotics as soon as able.   OK to shower after discharge.  May resume regular diet.  No heavy lifting >10lbs day of procedure, avoid strenuous activity, may walk.  You may have a string taped to your pelvis, genitalia, or inner thigh. Please take care while showering/wiping that you do not tug on the string and dislodge your indwelling stent early.    OK to remove stent by pulling the string straight out in 3 days. Please call the office if you have difficulty removing your stent  You may see blood in the urine after the procedure and entire time stent is in place. This should resolve over the next couple days.  Please stay hydrated.  You may experience flank pain, and/or frequency/urgency of urination while the stent is in place.  Please use Flomax (Tamsulosin) to help with these symptoms.  Please call attending physician or hospital  with questions.  Please call or present to ED for fever >101 F, intractable nausea and vomiting, or uncontrolled pain.  Follow up with Dr. Anders in 6 weeks with renal ultrasound prior to appointment. Call office to confirm appointment.     Pt should pull stent in the morning of 10/26/24.  There may be some pain associated with the stent removal, which is usually self limiting.  We suggest using the pain medication prescribed for you and a nonsteroidal anti-inflammatory such as Ibuprofen, if you are able to take this medication, to control symptoms. Take Ibuprofen as directed for 24 hrs after stent pull.  Please stay

## 2024-10-23 NOTE — ANESTHESIA PRE PROCEDURE
09:50 AM    GFRAA >60 04/14/2022 08:00 AM    LABGLOM 58 01/18/2024 09:50 AM    GLUCOSE 118 01/18/2024 09:50 AM    CALCIUM 10.3 01/18/2024 09:50 AM    BILITOT 0.3 02/27/2024 10:50 AM    ALKPHOS 79 02/27/2024 10:50 AM    AST 29 02/27/2024 10:50 AM    ALT 33 02/27/2024 10:50 AM       POC Tests:   Recent Labs     10/23/24  1157   POCGLU 95   POCNA 136*   POCK 4.0   POCCL 101   POCBUN 25   POCHCT 52*       Coags: No results found for: \"PROTIME\", \"INR\", \"APTT\"    HCG (If Applicable): No results found for: \"PREGTESTUR\", \"PREGSERUM\", \"HCG\", \"HCGQUANT\"     ABGs: No results found for: \"PHART\", \"PO2ART\", \"RJT5PNX\", \"LXB8LZE\", \"BEART\", \"G7MBUIRV\"     Type & Screen (If Applicable):  No results found for: \"LABABO\"    Drug/Infectious Status (If Applicable):  Lab Results   Component Value Date/Time    HEPCAB NONREACTIVE 08/31/2017 08:13 AM       COVID-19 Screening (If Applicable): No results found for: \"COVID19\"        TTE 4/2/2024    Left Ventricle: Normal left ventricular systolic function with a visually estimated EF of 55 - 60%. Left ventricle size is normal. Normal wall thickness. Normal wall motion.    Mitral Valve: Mild regurgitation.    Tricuspid Valve: Mild regurgitation. Normal RVSP. The estimated RVSP is 16 mmHg.    Image quality is adequate.      EKG  10/23/2024  Sinus rhythm with occasional Premature atrial complexes        Anesthesia Evaluation  Patient summary reviewed and Nursing notes reviewed  Airway: Mallampati: III  TM distance: >3 FB   Neck ROM: full  Mouth opening: > = 3 FB   Dental:    (+) other      Pulmonary:Negative Pulmonary ROS and normal exam                               Cardiovascular:    (+) hypertension:      ECG reviewed      Echocardiogram reviewed                  Neuro/Psych:   Negative Neuro/Psych ROS              GI/Hepatic/Renal:   (+) GERD:          Endo/Other: Negative Endo/Other ROS                    Abdominal:   (+) obese          Vascular: negative vascular ROS.         Other Findings:

## 2024-10-24 LAB
EKG ATRIAL RATE: 77 BPM
EKG P AXIS: 25 DEGREES
EKG P-R INTERVAL: 142 MS
EKG Q-T INTERVAL: 396 MS
EKG QRS DURATION: 82 MS
EKG QTC CALCULATION (BAZETT): 448 MS
EKG R AXIS: 23 DEGREES
EKG T AXIS: 28 DEGREES
EKG VENTRICULAR RATE: 77 BPM

## 2024-10-24 PROCEDURE — 93010 ELECTROCARDIOGRAM REPORT: CPT | Performed by: INTERNAL MEDICINE

## 2024-10-24 NOTE — ANESTHESIA POSTPROCEDURE EVALUATION
Department of Anesthesiology  Postprocedure Note    Patient: Xiomara Monroy  MRN: [id number]  YOB: 1950  Date of evaluation: 10/24/2024    Procedure Summary       Date: 10/23/24 Room / Location: [address]    Anesthesia Start: 1332 Anesthesia Stop: 1432    Procedure: HOLMIUM LASER STANDBY CYSTOSCOPY, URETEROSCOPY, STENT PLACEMENT (Left: Ureter) Diagnosis:       Kidney stone      (Kidney stone [N20.0])    Surgeons: Sander Anders MD Responsible Provider: Yemi Da Silva MD    Anesthesia Type: general ASA Status: 3            Anesthesia Type: No value filed.    Jarad Phase I: Jarad Score: 10    Jarad Phase II: Jarad Score: 10    Anesthesia Post Evaluation    Patient location during evaluation: PACU  Patient participation: complete - patient participated  Level of consciousness: awake and alert  Airway patency: patent  Nausea & Vomiting: no nausea and no vomiting  Cardiovascular status: blood pressure returned to baseline  Respiratory status: acceptable  Hydration status: euvolemic  Pain management: adequate    There were no known notable events for this encounter.

## 2024-11-01 ENCOUNTER — TELEPHONE (OUTPATIENT)
Dept: UROLOGY | Age: 74
End: 2024-11-01

## 2024-11-06 ENCOUNTER — OFFICE VISIT (OUTPATIENT)
Dept: UROLOGY | Age: 74
End: 2024-11-06
Payer: MEDICARE

## 2024-11-06 VITALS
OXYGEN SATURATION: 96 % | BODY MASS INDEX: 36.32 KG/M2 | DIASTOLIC BLOOD PRESSURE: 84 MMHG | TEMPERATURE: 97.9 F | HEIGHT: 65 IN | SYSTOLIC BLOOD PRESSURE: 136 MMHG | WEIGHT: 218 LBS | HEART RATE: 87 BPM

## 2024-11-06 DIAGNOSIS — N39.41 URGE URINARY INCONTINENCE: ICD-10-CM

## 2024-11-06 DIAGNOSIS — R10.9 FLANK PAIN: Primary | ICD-10-CM

## 2024-11-06 DIAGNOSIS — R35.0 URINARY FREQUENCY: ICD-10-CM

## 2024-11-06 PROCEDURE — G8399 PT W/DXA RESULTS DOCUMENT: HCPCS | Performed by: NURSE PRACTITIONER

## 2024-11-06 PROCEDURE — G8417 CALC BMI ABV UP PARAM F/U: HCPCS | Performed by: NURSE PRACTITIONER

## 2024-11-06 PROCEDURE — G8427 DOCREV CUR MEDS BY ELIG CLIN: HCPCS | Performed by: NURSE PRACTITIONER

## 2024-11-06 PROCEDURE — 3075F SYST BP GE 130 - 139MM HG: CPT | Performed by: NURSE PRACTITIONER

## 2024-11-06 PROCEDURE — 1123F ACP DISCUSS/DSCN MKR DOCD: CPT | Performed by: NURSE PRACTITIONER

## 2024-11-06 PROCEDURE — 1036F TOBACCO NON-USER: CPT | Performed by: NURSE PRACTITIONER

## 2024-11-06 PROCEDURE — 3079F DIAST BP 80-89 MM HG: CPT | Performed by: NURSE PRACTITIONER

## 2024-11-06 PROCEDURE — 1090F PRES/ABSN URINE INCON ASSESS: CPT | Performed by: NURSE PRACTITIONER

## 2024-11-06 PROCEDURE — 1159F MED LIST DOCD IN RCRD: CPT | Performed by: NURSE PRACTITIONER

## 2024-11-06 PROCEDURE — 3017F COLORECTAL CA SCREEN DOC REV: CPT | Performed by: NURSE PRACTITIONER

## 2024-11-06 PROCEDURE — 99213 OFFICE O/P EST LOW 20 MIN: CPT | Performed by: NURSE PRACTITIONER

## 2024-11-06 PROCEDURE — G8482 FLU IMMUNIZE ORDER/ADMIN: HCPCS | Performed by: NURSE PRACTITIONER

## 2024-11-06 PROCEDURE — 0509F URINE INCON PLAN DOCD: CPT | Performed by: NURSE PRACTITIONER

## 2024-11-06 ASSESSMENT — ENCOUNTER SYMPTOMS
BACK PAIN: 0
WHEEZING: 0
DIARRHEA: 0
VOMITING: 0
COUGH: 0
NAUSEA: 0
CONSTIPATION: 0
EYE REDNESS: 0
EYE PAIN: 0
ABDOMINAL PAIN: 0
SHORTNESS OF BREATH: 0

## 2024-11-06 NOTE — TELEPHONE ENCOUNTER
Patient was seen in the office on 11/06/24 for an OV. Patient informed CNP, [place]ivage she is not interested in having interstim done at this time. Patient to follow up in 3 months to re evaluate.

## 2024-11-06 NOTE — PROGRESS NOTES
Review of Systems   Constitutional:  Negative for chills, fatigue and fever.   Eyes:  Negative for pain, redness and visual disturbance.   Respiratory:  Negative for cough, shortness of breath and wheezing.    Cardiovascular:  Negative for chest pain and leg swelling.   Gastrointestinal:  Negative for abdominal pain, constipation, diarrhea, nausea and vomiting.   Genitourinary:  Positive for flank pain. Negative for difficulty urinating, dysuria, frequency, hematuria and urgency.   Musculoskeletal:  Negative for back pain, joint swelling and myalgias.   Skin:  Negative for rash and wound.   Neurological:  Negative for dizziness, tremors, weakness and numbness.   Hematological:  Does not bruise/bleed easily.

## 2024-11-06 NOTE — PROGRESS NOTES
[facility], [facility]  [address]  [facility] [address]  Dept: [phone]    [facility] Urology Office Note - Established    Patient:  Xiomara Monroy  YOB: 1950  Date: 11/6/2024    The patient is a 74 y.o. female whopresents today for evaluation of the following problems:   Chief Complaint   Patient presents with    Other     Flank Pain       HPI  Patient is a 74-year-old female who is presenting for follow-up.  She has urinary frequency and urgency with occasional urge incontinence.   She was previously supposed to be scheduled for an InterStim device but developed left flank pain, so that was held off on.  She had a CT which demonstrated small nonobstructing 2 mm left renal stones.  She had complained of severe pain thus desired to proceed with stone treatment although we had explained to her that was unlikely to be the cause of her pain as they were nonobstructing.    She did undergo procedure on 10/23/2024.  No stones were visualized at that time.  She had a stent placed which she removed herself at home.  She continues to have intermittent left flank pain.  Pain is a gradual onset and worsens throughout the day, generally last for an entire day.  She takes Flexeril which does help the pain.  She states that since the procedure her urinary issues have improved.  She was voiding every 45 minutes prior to the procedure, she is now voiding every 2 hours.  Her nocturia has improved as well.  She is up only 1-2 times a night.  Mild urgency, rare episodes of urge incontinence.  She denies any hematuria or dysuria.       Summary of old records: N/A    Additional History: N/A    Procedures Today: N/A    Urinalysis today:  No results found for this visit on 11/06/24.    Imaging Reviewed during this Office Visit: none    Last BUN and creatinine:  Lab Results   Component Value Date    BUN 20 01/18/2024     Lab Results

## 2024-11-08 ENCOUNTER — PATIENT MESSAGE (OUTPATIENT)
Dept: UROLOGY | Age: 74
End: 2024-11-08

## 2024-11-08 DIAGNOSIS — R10.9 FLANK PAIN: Primary | ICD-10-CM

## 2024-11-11 ENCOUNTER — OFFICE VISIT (OUTPATIENT)
Dept: FAMILY MEDICINE CLINIC | Age: 74
End: 2024-11-11

## 2024-11-11 VITALS
DIASTOLIC BLOOD PRESSURE: 80 MMHG | BODY MASS INDEX: 35.94 KG/M2 | OXYGEN SATURATION: 99 % | WEIGHT: 216 LBS | SYSTOLIC BLOOD PRESSURE: 136 MMHG | HEART RATE: 89 BPM

## 2024-11-11 DIAGNOSIS — I10 ESSENTIAL HYPERTENSION: ICD-10-CM

## 2024-11-11 DIAGNOSIS — B96.89 ACUTE BACTERIAL SINUSITIS: ICD-10-CM

## 2024-11-11 DIAGNOSIS — J01.90 ACUTE BACTERIAL SINUSITIS: ICD-10-CM

## 2024-11-11 DIAGNOSIS — N30.00 ACUTE CYSTITIS WITHOUT HEMATURIA: ICD-10-CM

## 2024-11-11 DIAGNOSIS — R35.0 URINARY FREQUENCY: Primary | ICD-10-CM

## 2024-11-11 LAB
BILIRUBIN, POC: ABNORMAL
BLOOD URINE, POC: ABNORMAL
CLARITY, POC: ABNORMAL
COLOR, POC: YELLOW
GLUCOSE URINE, POC: ABNORMAL MG/DL
KETONES, POC: ABNORMAL MG/DL
LEUKOCYTE EST, POC: ABNORMAL
NITRITE, POC: ABNORMAL
PH, POC: 8
PROTEIN, POC: ABNORMAL MG/DL
SPECIFIC GRAVITY, POC: 1.01
UROBILINOGEN, POC: 0.2 MG/DL

## 2024-11-11 RX ORDER — CEPHALEXIN 500 MG/1
500 CAPSULE ORAL 2 TIMES DAILY
Qty: 14 CAPSULE | Refills: 0 | Status: SHIPPED | OUTPATIENT
Start: 2024-11-11 | End: 2024-11-18

## 2024-11-11 RX ORDER — LISINOPRIL AND HYDROCHLOROTHIAZIDE 12.5; 2 MG/1; MG/1
1 TABLET ORAL DAILY
Qty: 90 TABLET | Refills: 1 | Status: SHIPPED | OUTPATIENT
Start: 2024-11-11

## 2024-11-11 ASSESSMENT — ENCOUNTER SYMPTOMS: BOWEL INCONTINENCE: 0

## 2024-11-11 NOTE — ASSESSMENT & PLAN NOTE
Stable continue med.     Orders:    lisinopril-hydroCHLOROthiazide (PRINZIDE;ZESTORETIC) 20-12.5 MG per tablet; Take 1 tablet by mouth daily

## 2024-11-11 NOTE — PROGRESS NOTES
Xiomara Monroy (:  1950) is a 74 y.o. female,Established patient, here for evaluation of the following chief complaint(s):  Flank Pain (States she has left side pain that has been going on for months/States she has a similar pain when she has a kidney stone/States she has been seeing nephrology but they referred her back to PCP, states they don't feel she has a kidney stone)         Assessment & Plan  Essential hypertension   Stable continue med.     Orders:    lisinopril-hydroCHLOROthiazide (PRINZIDE;ZESTORETIC) 20-12.5 MG per tablet; Take 1 tablet by mouth daily    Urinary frequency       Orders:    POCT Urinalysis No Micro (Auto)    Acute cystitis without hematuria   Acute condition, new, add keflex.     Orders:    cephALEXin (KEFLEX) 500 MG capsule; Take 1 capsule by mouth 2 times daily for 7 days    Acute bacterial sinusitis       Orders:    cephALEXin (KEFLEX) 500 MG capsule; Take 1 capsule by mouth 2 times daily for 7 days      No follow-ups on file.       Subjective   Flank Pain  This is a recurrent problem. The current episode started more than 1 month ago. The pain is present in the lumbar spine. The quality of the pain is described as aching and cramping. Exacerbated by: nothing. Pertinent negatives include no bladder incontinence or bowel incontinence.     Left flank pain.  Known kidney stones. Had a stent, felt she passed the stones.  The pain has never completley resolved. She was told to return to pcp.  She said she thinks she has a \"low grade infection somewhere\" that just never fully goes away.   Was seen in urgent care a month ago, was dx with viral sinusitis. Still having pain.  Still a lot of drainage.      Feels she has a uti.  Having frequency, urgency, burning. Contact urology on Friday but hasn't gotten a response.       Still thinks the lipitor are in her legs causing leg pain. Has been seeing pt.         Review of Systems   Gastrointestinal:  Negative for bowel

## 2024-11-25 ENCOUNTER — HOSPITAL ENCOUNTER (OUTPATIENT)
Age: 74
Setting detail: SPECIMEN
Discharge: HOME | End: 2024-11-25

## 2024-11-25 ENCOUNTER — TELEPHONE (OUTPATIENT)
Dept: FAMILY MEDICINE CLINIC | Age: 74
End: 2024-11-25

## 2024-11-25 DIAGNOSIS — R35.0 URINARY FREQUENCY: ICD-10-CM

## 2024-11-25 DIAGNOSIS — R35.0 URINARY FREQUENCY: Primary | ICD-10-CM

## 2024-11-25 NOTE — TELEPHONE ENCOUNTER
Please drop off another urine so we can send for culture to make sure we are treating with the right antibiotic

## 2024-11-25 NOTE — TELEPHONE ENCOUNTER
Pt. Called stating she finished taking the antibiotic, she states Friday she felt better but know her symptoms are back she states can you send her in another antibiotic.       Xiomara   [phone]

## 2024-11-26 LAB
MICROORGANISM SPEC CULT: NORMAL
SERVICE CMNT-IMP: NORMAL
SPECIMEN DESCRIPTION: NORMAL

## 2024-12-17 ENCOUNTER — OFFICE VISIT (OUTPATIENT)
Dept: FAMILY MEDICINE CLINIC | Age: 74
End: 2024-12-17

## 2024-12-17 ENCOUNTER — HOSPITAL ENCOUNTER (OUTPATIENT)
Age: 74
Setting detail: SPECIMEN
Discharge: HOME | End: 2024-12-17

## 2024-12-17 VITALS
WEIGHT: 218 LBS | BODY MASS INDEX: 36.28 KG/M2 | OXYGEN SATURATION: 98 % | DIASTOLIC BLOOD PRESSURE: 80 MMHG | SYSTOLIC BLOOD PRESSURE: 132 MMHG | HEART RATE: 83 BPM

## 2024-12-17 DIAGNOSIS — N89.8 VAGINAL IRRITATION: ICD-10-CM

## 2024-12-17 DIAGNOSIS — R73.9 HYPERGLYCEMIA: ICD-10-CM

## 2024-12-17 DIAGNOSIS — N89.8 VAGINAL IRRITATION: Primary | ICD-10-CM

## 2024-12-17 LAB
CANDIDA SPECIES: POSITIVE
GARDNERELLA VAGINALIS: POSITIVE
HBA1C MFR BLD: 5.9 %
SOURCE: ABNORMAL
TRICHOMONAS: NEGATIVE

## 2024-12-17 RX ORDER — FLUCONAZOLE 150 MG/1
150 TABLET ORAL ONCE
Qty: 1 TABLET | Refills: 0 | Status: SHIPPED | OUTPATIENT
Start: 2024-12-17 | End: 2024-12-17

## 2024-12-17 ASSESSMENT — ENCOUNTER SYMPTOMS
COUGH: 0
SHORTNESS OF BREATH: 0

## 2024-12-17 NOTE — PROGRESS NOTES
Xiomara Monroy (:  1950) is a 74 y.o. female,Established patient, here for evaluation of the following chief complaint(s):  Skin Problem (States she was recently on antibiotics for a UTI/States her skin around her genital area is very raw and has been bleeding /States he is having itching also/States this is also in her anal area ) and Leg Pain (States she is still having this pain after being on atorvastatin/States she has been doing PT which helps some )         Assessment & Plan  Vaginal irritation   New, not at goal (unstable), continue current plan pending work up below  Declines further sti testing.     Orders:    Vaginitis DNA Probe; Future    fluconazole (DIFLUCAN) 150 MG tablet; Take 1 tablet by mouth once for 1 dose    Hyperglycemia       Orders:    POCT glycosylated hemoglobin (Hb A1C)      No follow-ups on file.       Subjective   Skin Problem  Pertinent negatives include no cough, fever or shortness of breath.   Leg Pain     Patient reports continued genital irritation for more than a month.  Patient states she did see a dermatologist who examine the area and told her to buy some topical antifungal cream, she reports this did not help.  She was treated by this office for UTI on .  Patient reports the first few days of the antibiotic the rash and excoriation was completely cleared up but then returned.  We did check a urine for another UTI with a culture that was normal.  Patient denies any vaginal discharge.  She has been with the same sexual partner for 2 years declines further STI testing.    Review of Systems   Constitutional:  Negative for chills and fever.   Respiratory:  Negative for cough and shortness of breath.    Cardiovascular:  Negative for chest pain, palpitations and leg swelling.     Objective   Vitals:    24 1329   BP: 132/80   Pulse: 83   SpO2: 98%     Wt Readings from Last 3 Encounters:   24 98.9 kg (218 lb)   24 98 kg (216 lb)   24

## 2024-12-19 RX ORDER — METRONIDAZOLE 500 MG/1
500 TABLET ORAL 2 TIMES DAILY
Qty: 14 TABLET | Refills: 0 | Status: SHIPPED | OUTPATIENT
Start: 2024-12-19 | End: 2024-12-26

## 2024-12-19 NOTE — TELEPHONE ENCOUNTER
Last ov 10/21/24, next appointment 2/10/25  Plan:   Cysto left urs hll stent st v's  We will postpone InterStim until after above.  I did explain to patient that it is a nonobstructing stone but she is having significant symptoms and we will treat the stone to eliminate this variable as a possible cause for her significant left flank pain

## 2024-12-23 RX ORDER — POTASSIUM CITRATE 10 MEQ/1
TABLET, EXTENDED RELEASE ORAL
Qty: 180 TABLET | Refills: 3 | Status: SHIPPED | OUTPATIENT
Start: 2024-12-23

## 2024-12-27 ENCOUNTER — HOSPITAL ENCOUNTER (EMERGENCY)
Age: 74
Discharge: HOME | End: 2024-12-27
Attending: EMERGENCY MEDICINE
Payer: MEDICARE

## 2024-12-27 ENCOUNTER — APPOINTMENT (OUTPATIENT)
Dept: GENERAL RADIOLOGY | Age: 74
End: 2024-12-27
Payer: MEDICARE

## 2024-12-27 VITALS
SYSTOLIC BLOOD PRESSURE: 141 MMHG | RESPIRATION RATE: 17 BRPM | OXYGEN SATURATION: 94 % | DIASTOLIC BLOOD PRESSURE: 73 MMHG | BODY MASS INDEX: 29.95 KG/M2 | TEMPERATURE: 98.1 F | WEIGHT: 180 LBS | HEART RATE: 113 BPM

## 2024-12-27 DIAGNOSIS — R11.2 NAUSEA VOMITING AND DIARRHEA: Primary | ICD-10-CM

## 2024-12-27 DIAGNOSIS — R19.7 NAUSEA VOMITING AND DIARRHEA: Primary | ICD-10-CM

## 2024-12-27 LAB
ALBUMIN SERPL-MCNC: 5.1 G/DL (ref 3.5–5.2)
ALBUMIN/GLOB SERPL: 1.5 {RATIO} (ref 1–2.5)
ALP SERPL-CCNC: 99 U/L (ref 35–104)
ALT SERPL-CCNC: 95 U/L (ref 10–35)
ANION GAP SERPL CALCULATED.3IONS-SCNC: 20 MMOL/L (ref 9–16)
AST SERPL-CCNC: 100 U/L (ref 10–35)
BASOPHILS # BLD: <0.03 K/UL (ref 0–0.2)
BASOPHILS NFR BLD: 0 % (ref 0–2)
BILIRUB SERPL-MCNC: 0.8 MG/DL (ref 0–1.2)
BUN SERPL-MCNC: 27 MG/DL (ref 8–23)
CALCIUM SERPL-MCNC: 11.1 MG/DL (ref 8.6–10.4)
CHLORIDE SERPL-SCNC: 97 MMOL/L (ref 98–107)
CO2 SERPL-SCNC: 18 MMOL/L (ref 20–31)
CREAT SERPL-MCNC: 0.9 MG/DL (ref 0.6–0.9)
EOSINOPHIL # BLD: <0.03 K/UL (ref 0–0.44)
EOSINOPHILS RELATIVE PERCENT: 0 % (ref 1–4)
ERYTHROCYTE [DISTWIDTH] IN BLOOD BY AUTOMATED COUNT: 13.9 % (ref 11.8–14.4)
FLUAV AG SPEC QL: NEGATIVE
FLUBV AG SPEC QL: NEGATIVE
GFR, ESTIMATED: 68 ML/MIN/1.73M2
GLUCOSE SERPL-MCNC: 201 MG/DL (ref 74–99)
HCT VFR BLD AUTO: 46.9 % (ref 36.3–47.1)
HGB BLD-MCNC: 15.8 G/DL (ref 11.9–15.1)
IMM GRANULOCYTES # BLD AUTO: 0.07 K/UL (ref 0–0.3)
IMM GRANULOCYTES NFR BLD: 1 %
LIPASE SERPL-CCNC: 25 U/L (ref 13–60)
LYMPHOCYTES NFR BLD: 0.88 K/UL (ref 1.1–3.7)
LYMPHOCYTES RELATIVE PERCENT: 6 % (ref 24–43)
MCH RBC QN AUTO: 28.7 PG (ref 25.2–33.5)
MCHC RBC AUTO-ENTMCNC: 33.7 G/DL (ref 28.4–34.8)
MCV RBC AUTO: 85.3 FL (ref 82.6–102.9)
MONOCYTES NFR BLD: 0.19 K/UL (ref 0.1–1.2)
MONOCYTES NFR BLD: 1 % (ref 3–12)
NEUTROPHILS NFR BLD: 92 % (ref 36–65)
NEUTS SEG NFR BLD: 12.66 K/UL (ref 1.5–8.1)
NRBC BLD-RTO: 0 PER 100 WBC
PLATELET # BLD AUTO: 257 K/UL (ref 138–453)
PMV BLD AUTO: 11.5 FL (ref 8.1–13.5)
POTASSIUM SERPL-SCNC: 3.5 MMOL/L (ref 3.7–5.3)
PROT SERPL-MCNC: 8.5 G/DL (ref 6.6–8.7)
RBC # BLD AUTO: 5.5 M/UL (ref 3.95–5.11)
SODIUM SERPL-SCNC: 135 MMOL/L (ref 136–145)
TROPONIN I SERPL HS-MCNC: 10 NG/L (ref 0–14)
WBC OTHER # BLD: 13.8 K/UL (ref 3.5–11.3)

## 2024-12-27 PROCEDURE — 84484 ASSAY OF TROPONIN QUANT: CPT

## 2024-12-27 PROCEDURE — 80053 COMPREHEN METABOLIC PANEL: CPT

## 2024-12-27 PROCEDURE — 71045 X-RAY EXAM CHEST 1 VIEW: CPT

## 2024-12-27 PROCEDURE — 96376 TX/PRO/DX INJ SAME DRUG ADON: CPT | Performed by: EMERGENCY MEDICINE

## 2024-12-27 PROCEDURE — 93005 ELECTROCARDIOGRAM TRACING: CPT

## 2024-12-27 PROCEDURE — 83690 ASSAY OF LIPASE: CPT

## 2024-12-27 PROCEDURE — 99285 EMERGENCY DEPT VISIT HI MDM: CPT | Performed by: EMERGENCY MEDICINE

## 2024-12-27 PROCEDURE — 96375 TX/PRO/DX INJ NEW DRUG ADDON: CPT | Performed by: EMERGENCY MEDICINE

## 2024-12-27 PROCEDURE — 96374 THER/PROPH/DIAG INJ IV PUSH: CPT | Performed by: EMERGENCY MEDICINE

## 2024-12-27 PROCEDURE — 6360000002 HC RX W HCPCS

## 2024-12-27 PROCEDURE — 6370000000 HC RX 637 (ALT 250 FOR IP)

## 2024-12-27 PROCEDURE — 2580000003 HC RX 258

## 2024-12-27 PROCEDURE — 85025 COMPLETE CBC W/AUTO DIFF WBC: CPT

## 2024-12-27 PROCEDURE — 87804 INFLUENZA ASSAY W/OPTIC: CPT

## 2024-12-27 PROCEDURE — 96361 HYDRATE IV INFUSION ADD-ON: CPT | Performed by: EMERGENCY MEDICINE

## 2024-12-27 RX ORDER — POTASSIUM CHLORIDE 7.45 MG/ML
10 INJECTION INTRAVENOUS ONCE
Status: DISCONTINUED | OUTPATIENT
Start: 2024-12-27 | End: 2024-12-27

## 2024-12-27 RX ORDER — ONDANSETRON 2 MG/ML
4 INJECTION INTRAMUSCULAR; INTRAVENOUS ONCE
Status: COMPLETED | OUTPATIENT
Start: 2024-12-27 | End: 2024-12-27

## 2024-12-27 RX ORDER — DIPHENHYDRAMINE HYDROCHLORIDE 50 MG/ML
25 INJECTION INTRAMUSCULAR; INTRAVENOUS ONCE
Status: COMPLETED | OUTPATIENT
Start: 2024-12-27 | End: 2024-12-27

## 2024-12-27 RX ORDER — PROCHLORPERAZINE EDISYLATE 5 MG/ML
10 INJECTION INTRAMUSCULAR; INTRAVENOUS ONCE
Status: COMPLETED | OUTPATIENT
Start: 2024-12-27 | End: 2024-12-27

## 2024-12-27 RX ORDER — DIPHENHYDRAMINE HCL 25 MG
25 CAPSULE ORAL EVERY 4 HOURS PRN
Qty: 30 CAPSULE | Refills: 0 | Status: SHIPPED | OUTPATIENT
Start: 2024-12-27 | End: 2025-01-01

## 2024-12-27 RX ORDER — PROCHLORPERAZINE MALEATE 10 MG
10 TABLET ORAL EVERY 6 HOURS PRN
Qty: 12 TABLET | Refills: 0 | Status: SHIPPED | OUTPATIENT
Start: 2024-12-27 | End: 2024-12-28

## 2024-12-27 RX ORDER — 0.9 % SODIUM CHLORIDE 0.9 %
1000 INTRAVENOUS SOLUTION INTRAVENOUS ONCE
Status: COMPLETED | OUTPATIENT
Start: 2024-12-27 | End: 2024-12-27

## 2024-12-27 RX ORDER — POTASSIUM CHLORIDE 1500 MG/1
40 TABLET, EXTENDED RELEASE ORAL ONCE
Status: COMPLETED | OUTPATIENT
Start: 2024-12-27 | End: 2024-12-27

## 2024-12-27 RX ADMIN — ONDANSETRON 4 MG: 2 INJECTION INTRAMUSCULAR; INTRAVENOUS at 14:08

## 2024-12-27 RX ADMIN — SODIUM CHLORIDE 1000 ML: 9 INJECTION, SOLUTION INTRAVENOUS at 14:08

## 2024-12-27 RX ADMIN — DIPHENHYDRAMINE HYDROCHLORIDE 25 MG: 50 INJECTION INTRAMUSCULAR; INTRAVENOUS at 15:06

## 2024-12-27 RX ADMIN — ONDANSETRON 4 MG: 2 INJECTION INTRAMUSCULAR; INTRAVENOUS at 12:40

## 2024-12-27 RX ADMIN — PROCHLORPERAZINE EDISYLATE 10 MG: 5 INJECTION INTRAMUSCULAR; INTRAVENOUS at 15:07

## 2024-12-27 RX ADMIN — POTASSIUM CHLORIDE 40 MEQ: 1500 TABLET, EXTENDED RELEASE ORAL at 16:10

## 2024-12-27 ASSESSMENT — PAIN SCALES - GENERAL
PAINLEVEL_OUTOF10: 5
PAINLEVEL_OUTOF10: 0

## 2024-12-27 ASSESSMENT — PAIN - FUNCTIONAL ASSESSMENT: PAIN_FUNCTIONAL_ASSESSMENT: 0-10

## 2024-12-27 ASSESSMENT — PAIN DESCRIPTION - LOCATION: LOCATION: ABDOMEN

## 2024-12-27 NOTE — ED PROVIDER NOTES
[facility] EMERGENCY DEPARTMENT     Emergency Department     Faculty Attestation    I performed a history and physical examination of the patient and discussed management with the resident. I reviewed the resident’s note and agree with the documented findings and plan of care. Any areas of disagreement are noted on the chart. I was personally present for the key portions of any procedures. I have documented in the chart those procedures where I was not present during the key portions. I have reviewed the emergency nurses triage note. I agree with the chief complaint, past medical history, past surgical history, allergies, medications, social and family history as documented unless otherwise noted below. For Physician Assistant/ Nurse Practitioner cases/documentation I have personally evaluated this patient and have completed at least one if not all key elements of the E/M (history, physical exam, and MDM). Additional findings are as noted.    Note Started: 1:07 PM EST    Patient here with vomiting diarrhea began last night 9 PM.  Independent history from significant other.  States that when out with friends yesterday for the holidays stated the just nothing tasted right.  No one else with whom she went out is sick or having similar symptoms.  No blood in the emesis or stools.  No recent travel no antibiotics.  Some radiation of epigastric pain into her chest has a known ulcer she follows with GI.  On exam appears uncomfortable but nontoxic.  Lungs clear heart regular equal pulses throughout.  Abdomen soft mild diffuse tenderness worse epigastric no pulsatile mass.  Distal pedal pulses intact.  Will check labs meds reevaluate    EKG interpretation: Sinus rhythm 91 normal intervals normal axis no acute ST or T changes no acute finding    Critical Care     none    Eran Bueno MD, FACEP, FAAEM  Attending Emergency  Physician           Eran Bueno MD  12/27/24 [id number]

## 2024-12-27 NOTE — DISCHARGE INSTRUCTIONS
You were seen in the ER today for nausea, vomiting, diarrhea.  Flu is negative.  This could be due to another viral illness though.  Your labs show dehydration.  You were given fluids in the department as well as nausea medication.  You have been sent home with nausea medication, use as directed.    Drink plenty of fluids. Start your diet back with bland food such as crackers, soup, applesauce, toast, rice. Advance slowly as tolerated. It is more important that you stay hydrated than eat for the first few days after a stomach bug.      Please follow-up with your primary care doctor within the next few days for reevaluation. Return to the ER if new or worsening symptoms or any other concerns.

## 2024-12-27 NOTE — ED TRIAGE NOTES
Pt arrived to ED 35 via triage.   Pt co vomiting and diarrhea.   Pt states that it started around 9pm last night.   Pt denies any CP or SOB.   Pt is resting on stretcher with call light within reach.  Breathing is non labored and no acute distress is noted.   Will continue to follow plan of care

## 2024-12-27 NOTE — ED PROVIDER NOTES
[facility] EMERGENCY DEPARTMENT  Emergency Department Encounter  Emergency Medicine Resident     Pt Name:Xiomara Monroy  MRN: [id number]  Birthdate 1950  Date of evaluation: 12/27/24  PCP:  Adenike John APRN - CNP  Note Started: 12:17 PM EST      CHIEF COMPLAINT       Chief Complaint   Patient presents with    Vomiting    Diarrhea       HISTORY OF PRESENT ILLNESS  (Location/Symptom, Timing/Onset, Context/Setting, Quality, Duration, Modifying Factors, Severity.)      Xiomara Monroy is a 74 y.o. female who presents with nausea, vomiting, diarrhea that began last night.  She was out with friends yesterday and noticed that food and drinks were not sitting well, she felt somewhat nauseous.  Around 9 PM last night she started vomiting and has not stopped.  She has been unable to keep down any food or fluids since last night.  No one else that ate the same food is ill, no recent sick contacts.  She does note some associated epigastric shortness of breath, abdominal pain, and chest pain, though she does have a history of ulcers.  No fever, chills, cough, runny nose, sore throat, headache, urinary symptoms.    PAST MEDICAL / SURGICAL / SOCIAL / FAMILY HISTORY      has a past medical history of Arthritis, Chronic back pain, COVID-19, Gastric nodule, GERD (gastroesophageal reflux disease), GIST (gastrointestinal stromal tumor), non-malignant, History of stomach ulcers, Hypertension, Kidney stones, Obesity, Pain, Under care of team, Under care of team, and Wellness examination.     has a past surgical history that includes Cholecystectomy, laparoscopic (2012); Tubal ligation; Dilation and curettage of uterus (1982); Strabismus surgery (Bilateral); pr arthrp acetblr/prox fem prostc agrft/algrft (Left, 11/14/2017); Colonoscopy; Upper gastrointestinal endoscopy (09/15/2021); Upper gastrointestinal endoscopy (09/15/2021); other surgical history (09/13/2023); Upper gastrointestinal endoscopy

## 2024-12-28 ENCOUNTER — APPOINTMENT (OUTPATIENT)
Dept: CT IMAGING | Age: 74
End: 2024-12-28
Payer: MEDICARE

## 2024-12-28 ENCOUNTER — HOSPITAL ENCOUNTER (EMERGENCY)
Age: 74
Discharge: HOME | End: 2024-12-28
Attending: EMERGENCY MEDICINE
Payer: MEDICARE

## 2024-12-28 VITALS
HEART RATE: 92 BPM | OXYGEN SATURATION: 99 % | RESPIRATION RATE: 21 BRPM | DIASTOLIC BLOOD PRESSURE: 110 MMHG | SYSTOLIC BLOOD PRESSURE: 184 MMHG | TEMPERATURE: 98.3 F

## 2024-12-28 DIAGNOSIS — K52.9 GASTROENTERITIS: Primary | ICD-10-CM

## 2024-12-28 LAB
ALBUMIN SERPL-MCNC: 4.5 G/DL (ref 3.5–5.2)
ALBUMIN/GLOB SERPL: 1.3 {RATIO} (ref 1–2.5)
ALP SERPL-CCNC: 92 U/L (ref 35–104)
ALT SERPL-CCNC: 91 U/L (ref 10–35)
ANION GAP SERPL CALCULATED.3IONS-SCNC: 19 MMOL/L (ref 9–16)
AST SERPL-CCNC: 77 U/L (ref 10–35)
BACTERIA URNS QL MICRO: ABNORMAL
BASOPHILS # BLD: <0.03 K/UL (ref 0–0.2)
BASOPHILS NFR BLD: 0 % (ref 0–2)
BILIRUB SERPL-MCNC: 0.5 MG/DL (ref 0–1.2)
BILIRUB UR QL STRIP: NEGATIVE
BUN SERPL-MCNC: 27 MG/DL (ref 8–23)
CALCIUM SERPL-MCNC: 10.8 MG/DL (ref 8.6–10.4)
CASTS #/AREA URNS LPF: ABNORMAL /LPF (ref 0–8)
CHLORIDE SERPL-SCNC: 99 MMOL/L (ref 98–107)
CLARITY UR: CLEAR
CO2 SERPL-SCNC: 17 MMOL/L (ref 20–31)
COLOR UR: YELLOW
CREAT SERPL-MCNC: 1 MG/DL (ref 0.6–0.9)
EOSINOPHIL # BLD: <0.03 K/UL (ref 0–0.44)
EOSINOPHILS RELATIVE PERCENT: 0 % (ref 1–4)
EPI CELLS #/AREA URNS HPF: ABNORMAL /HPF (ref 0–5)
ERYTHROCYTE [DISTWIDTH] IN BLOOD BY AUTOMATED COUNT: 14.3 % (ref 11.8–14.4)
GFR, ESTIMATED: 60 ML/MIN/1.73M2
GLUCOSE SERPL-MCNC: 148 MG/DL (ref 74–99)
GLUCOSE UR STRIP-MCNC: NEGATIVE MG/DL
HCT VFR BLD AUTO: 45.7 % (ref 36.3–47.1)
HGB BLD-MCNC: 15.3 G/DL (ref 11.9–15.1)
HGB UR QL STRIP.AUTO: NEGATIVE
IMM GRANULOCYTES # BLD AUTO: 0.05 K/UL (ref 0–0.3)
IMM GRANULOCYTES NFR BLD: 0 %
KETONES UR STRIP-MCNC: ABNORMAL MG/DL
LEUKOCYTE ESTERASE UR QL STRIP: NEGATIVE
LIPASE SERPL-CCNC: 26 U/L (ref 13–60)
LYMPHOCYTES NFR BLD: 1.77 K/UL (ref 1.1–3.7)
LYMPHOCYTES RELATIVE PERCENT: 13 % (ref 24–43)
MCH RBC QN AUTO: 28.5 PG (ref 25.2–33.5)
MCHC RBC AUTO-ENTMCNC: 33.5 G/DL (ref 28.4–34.8)
MCV RBC AUTO: 85.3 FL (ref 82.6–102.9)
MONOCYTES NFR BLD: 0.84 K/UL (ref 0.1–1.2)
MONOCYTES NFR BLD: 6 % (ref 3–12)
NEUTROPHILS NFR BLD: 81 % (ref 36–65)
NEUTS SEG NFR BLD: 10.46 K/UL (ref 1.5–8.1)
NITRITE UR QL STRIP: NEGATIVE
NRBC BLD-RTO: 0 PER 100 WBC
PH UR STRIP: 7.5 [PH] (ref 5–8)
PLATELET # BLD AUTO: 248 K/UL (ref 138–453)
PMV BLD AUTO: 11.1 FL (ref 8.1–13.5)
POTASSIUM SERPL-SCNC: 3.9 MMOL/L (ref 3.7–5.3)
PROT SERPL-MCNC: 7.9 G/DL (ref 6.6–8.7)
PROT UR STRIP-MCNC: ABNORMAL MG/DL
RBC # BLD AUTO: 5.36 M/UL (ref 3.95–5.11)
RBC #/AREA URNS HPF: ABNORMAL /HPF (ref 0–4)
SARS-COV-2 RDRP RESP QL NAA+PROBE: NOT DETECTED
SODIUM SERPL-SCNC: 135 MMOL/L (ref 136–145)
SP GR UR STRIP: 1.01 (ref 1–1.03)
SPECIMEN DESCRIPTION: NORMAL
TROPONIN I SERPL HS-MCNC: 14 NG/L (ref 0–14)
UROBILINOGEN UR STRIP-ACNC: NORMAL EU/DL (ref 0–1)
WBC #/AREA URNS HPF: ABNORMAL /HPF (ref 0–5)
WBC OTHER # BLD: 13.2 K/UL (ref 3.5–11.3)

## 2024-12-28 PROCEDURE — 74174 CTA ABD&PLVS W/CONTRAST: CPT

## 2024-12-28 PROCEDURE — 83690 ASSAY OF LIPASE: CPT

## 2024-12-28 PROCEDURE — 96375 TX/PRO/DX INJ NEW DRUG ADDON: CPT

## 2024-12-28 PROCEDURE — 93005 ELECTROCARDIOGRAM TRACING: CPT

## 2024-12-28 PROCEDURE — 84484 ASSAY OF TROPONIN QUANT: CPT

## 2024-12-28 PROCEDURE — 81001 URINALYSIS AUTO W/SCOPE: CPT

## 2024-12-28 PROCEDURE — 87635 SARS-COV-2 COVID-19 AMP PRB: CPT

## 2024-12-28 PROCEDURE — 80053 COMPREHEN METABOLIC PANEL: CPT

## 2024-12-28 PROCEDURE — 87086 URINE CULTURE/COLONY COUNT: CPT

## 2024-12-28 PROCEDURE — 71275 CT ANGIOGRAPHY CHEST: CPT

## 2024-12-28 PROCEDURE — 6360000002 HC RX W HCPCS: Performed by: EMERGENCY MEDICINE

## 2024-12-28 PROCEDURE — 6360000004 HC RX CONTRAST MEDICATION: Performed by: EMERGENCY MEDICINE

## 2024-12-28 PROCEDURE — 96374 THER/PROPH/DIAG INJ IV PUSH: CPT

## 2024-12-28 PROCEDURE — 6360000002 HC RX W HCPCS

## 2024-12-28 PROCEDURE — 99285 EMERGENCY DEPT VISIT HI MDM: CPT

## 2024-12-28 PROCEDURE — 85025 COMPLETE CBC W/AUTO DIFF WBC: CPT

## 2024-12-28 PROCEDURE — 2580000003 HC RX 258

## 2024-12-28 RX ORDER — PROCHLORPERAZINE MALEATE 10 MG
10 TABLET ORAL EVERY 6 HOURS PRN
Qty: 12 TABLET | Refills: 0 | Status: SHIPPED | OUTPATIENT
Start: 2024-12-28 | End: 2024-12-31

## 2024-12-28 RX ORDER — LABETALOL HYDROCHLORIDE 5 MG/ML
10 INJECTION, SOLUTION INTRAVENOUS ONCE
Status: COMPLETED | OUTPATIENT
Start: 2024-12-28 | End: 2024-12-28

## 2024-12-28 RX ORDER — IOPAMIDOL 755 MG/ML
100 INJECTION, SOLUTION INTRAVASCULAR
Status: COMPLETED | OUTPATIENT
Start: 2024-12-28 | End: 2024-12-28

## 2024-12-28 RX ORDER — ONDANSETRON 4 MG/1
4 TABLET, ORALLY DISINTEGRATING ORAL 3 TIMES DAILY PRN
Qty: 9 TABLET | Refills: 0 | Status: SHIPPED | OUTPATIENT
Start: 2024-12-28 | End: 2024-12-31

## 2024-12-28 RX ORDER — 0.9 % SODIUM CHLORIDE 0.9 %
1000 INTRAVENOUS SOLUTION INTRAVENOUS ONCE
Status: COMPLETED | OUTPATIENT
Start: 2024-12-28 | End: 2024-12-28

## 2024-12-28 RX ORDER — PROCHLORPERAZINE EDISYLATE 5 MG/ML
10 INJECTION INTRAMUSCULAR; INTRAVENOUS ONCE
Status: COMPLETED | OUTPATIENT
Start: 2024-12-28 | End: 2024-12-28

## 2024-12-28 RX ORDER — ONDANSETRON 2 MG/ML
4 INJECTION INTRAMUSCULAR; INTRAVENOUS ONCE
Status: COMPLETED | OUTPATIENT
Start: 2024-12-28 | End: 2024-12-28

## 2024-12-28 RX ORDER — DIPHENHYDRAMINE HYDROCHLORIDE 50 MG/ML
25 INJECTION INTRAMUSCULAR; INTRAVENOUS ONCE
Status: COMPLETED | OUTPATIENT
Start: 2024-12-28 | End: 2024-12-28

## 2024-12-28 RX ADMIN — DIPHENHYDRAMINE HYDROCHLORIDE 25 MG: 50 INJECTION INTRAMUSCULAR; INTRAVENOUS at 10:19

## 2024-12-28 RX ADMIN — SODIUM CHLORIDE 1000 ML: 9 INJECTION, SOLUTION INTRAVENOUS at 08:50

## 2024-12-28 RX ADMIN — ONDANSETRON 4 MG: 2 INJECTION INTRAMUSCULAR; INTRAVENOUS at 08:50

## 2024-12-28 RX ADMIN — IOPAMIDOL 100 ML: 755 INJECTION, SOLUTION INTRAVENOUS at 10:48

## 2024-12-28 RX ADMIN — PROCHLORPERAZINE EDISYLATE 10 MG: 5 INJECTION INTRAMUSCULAR; INTRAVENOUS at 10:19

## 2024-12-28 RX ADMIN — Medication 10 MG: at 10:42

## 2024-12-28 ASSESSMENT — ENCOUNTER SYMPTOMS
COUGH: 0
BACK PAIN: 0
SHORTNESS OF BREATH: 0

## 2024-12-28 ASSESSMENT — LIFESTYLE VARIABLES: HOW OFTEN DO YOU HAVE A DRINK CONTAINING ALCOHOL: NEVER

## 2024-12-28 NOTE — DISCHARGE INSTRUCTIONS
Call today or tomorrow to follow up with Adenike John APRN - CNP  in 2 days.    Take your medication as indicated.  Do not drink any alcohol.  Avoid spicy foods and dairy products.  Avoid drinking carbonated beverages (especially any of the dark beverages like coke or pepsi).    Return to the Emergency Department for worsening of symptoms, excessive nausea or vomiting, throwing up blood, black stools, any other care or concern.

## 2024-12-28 NOTE — ED NOTES
Patient desatted to 88% on 2L NC. Patient was sleeping at the time. Patient changed to 3L NC and awakened with increase in oxygen saturation to 97%

## 2024-12-28 NOTE — ED PROVIDER NOTES
[facility] EMERGENCY DEPARTMENT  Emergency Department Encounter  Emergency Medicine Resident     Pt Name:Xiomara Monroy  MRN: [id number]  Birthdate 1950  Date of evaluation: 12/28/24  PCP:  Adenike John APRN - CNP  Note Started: 8:32 AM EST      CHIEF COMPLAINT       Chief Complaint   Patient presents with    Abdominal Pain    Vomiting       HISTORY OF PRESENT ILLNESS  (Location/Symptom, Timing/Onset, Context/Setting, Quality, Duration, Modifying Factors, Severity.)      Xiomara Monroy is a 74 y.o. female who presents with dry heaving.  Patient reports that she has been feeling unwell since 12/26.  She was seen in our ER yesterday and felt better on discharge however was unable to  the medication she was discharged on.  She reports that previously when she was seen here she was complaining of nausea, vomiting and diarrhea all of which have resolved.  She denies any sick contacts.  She denies any fever, chills, sweats, dysuria, frequency, urgency, vaginal discharge.  She does describe some epigastric abdominal pain whenever she dry heaves.  She reports that she has not been able to tolerate anything orally since 12/26.    PAST MEDICAL / SURGICAL / SOCIAL / FAMILY HISTORY      has a past medical history of Arthritis, Chronic back pain, COVID-19, Gastric nodule, GERD (gastroesophageal reflux disease), GIST (gastrointestinal stromal tumor), non-malignant, History of stomach ulcers, Hypertension, Kidney stones, Obesity, Pain, Under care of team, Under care of team, and Wellness examination.     has a past surgical history that includes Cholecystectomy, laparoscopic (2012); Tubal ligation; Dilation and curettage of uterus (1982); Strabismus surgery (Bilateral); pr arthrp acetblr/prox fem prostc agrft/algrft (Left, 11/14/2017); Colonoscopy; Upper gastrointestinal endoscopy (09/15/2021); Upper gastrointestinal endoscopy (09/15/2021); other surgical history (09/13/2023); Upper

## 2024-12-28 NOTE — ED NOTES
Patient oxygen level decreased to 76% on room air, patient was \"dozing off\"patient wakes to verbal stimuli. Oxygen level increases to 91% when talking. Placed on 2L via NC. Now 97% on 2L via NC. Dr. Curran notified.

## 2024-12-28 NOTE — ED PROVIDER NOTES
[facility]     Emergency Department     Faculty Attestation    I performed a history and physical examination of the patient and discussed management with the resident. I reviewed the resident’s note and agree with the documented findings including all diagnostic interpretations and plan of care. Any areas of disagreement are noted on the chart. I was personally present for the key portions of any procedures. I have documented in the chart those procedures where I was not present during the key portions. I have reviewed the emergency nurses triage note. I agree with the chief complaint, past medical history, past surgical history, allergies, medications, social and family history as documented unless otherwise noted below. Documentation of the HPI, Physical Exam and Medical Decision Making performed by sydnie is based on my personal performance of the HPI, PE and MDM. For Physician Assistant/ Nurse Practitioner cases/documentation I have personally evaluated this patient and have completed at least one if not all key elements of the E/M (history, physical exam, and MDM). Additional findings are as noted.    Primary Care Physician: Adenike John, APRN - CNP    Note Started: 1:08 PM EST     VITAL SIGNS:   oral temperature is 98.3 °F (36.8 °C). Her blood pressure is 184/110 (abnormal) and her pulse is 92. Her respiration is 21 and oxygen saturation is 99%.      Medical Decision Making  Nausea and vomiting.  Does report some vague belly pain worse after retching.  Seen recently for similar and had negative workup at that time.  She was unable to fill the antiemetics as her pharmacy reportedly did not stock some.  Examination.  Uncomfortable but not acutely distressed cardiac regular rate and rhythm no murmurs rubs gallops, pulmonary clear bilaterally abdomen soft nontender nondistended.  Of note in discussion with nursing forearm was noted to be  significantly different on BP cuff compared with the other with 200 systolic and 160 systolic on the opposing arm.  To evaluate for aortic dysfunction.  Labs, symptomatic treatment, reassess    Amount and/or Complexity of Data Reviewed  External Data Reviewed: labs and notes.  Labs: ordered. Decision-making details documented in ED Course.  Radiology: ordered.  ECG/medicine tests: ordered and independent interpretation performed.    Risk  Prescription drug management.        EKG Interpretation  EKG Interpretation    Interpreted by emergency department physician    Rhythm: normal sinus   Rate: normal  Axis: normal  Ectopy: PAC  Conduction: normal  ST Segments: no acute change  T Waves: no acute change  Q Waves: none    EKG  Impression: non-specific EKG    Latrell Lopez MD    Interpreted by me      Latrell Lopez MD, FACEP, FAAEM  Attending Emergency Physician         Latrell Lopez MD  12/28/24 1400

## 2024-12-28 NOTE — ED NOTES
Kelly Dumont     The following labs were labeled with appropriate pt sticker and tubed to lab:     [x] Blue     [x] Lavender   [] on ice  [x] Green/yellow  [x] Green/black [] on ice  [] Taylor  [] on ice  [x] Yellow  [] Red  [] Pink  [] Type/ Screen  [] ABG  [] VBG    [] COVID-19 swab    [] Rapid  [] PCR  [] Flu swab  [] Peds Viral Panel     [] Urine Sample  [] Fecal Sample  [] Pelvic Cultures  [] Blood Cultures  [] X 2  [] STREP Cultures  [] Wound Cultures

## 2024-12-28 NOTE — ED NOTES
The following labs were labeled with appropriate pt sticker and tubed to lab:     [] Blue     [] Lavender   [] on ice  [] Green/yellow  [] Green/black [] on ice  [] Grey  [] on ice  [] Yellow  [] Red  [] Pink  [] Type/ Screen  [] ABG  [] VBG    [x] COVID-19 swab    [x] Rapid  [] PCR  [] Flu swab  [] Peds Viral Panel     [] Urine Sample  [] Fecal Sample  [] Pelvic Cultures  [] Blood Cultures  [] X 2  [] STREP Cultures  [] Wound Cultures

## 2024-12-28 NOTE — ED NOTES
Patient presents to ED for evaluation of abdominal pain, nausea, vomiting. Patient was seen in ED yesterday for abdominal pain, nausea, vomiting, and diarrhea yesterday that started the day before. Patient was feeling better at time of discharge after zofran and benadryl/compazine. Patient went home, felt okay for about twelve hours, returning now because her pharmacy did not have her medications yet and the abdominal pain, nausea, and vomiting has returned.  Patient is alert and oriented x4, answering questions appropriately. Respirations even and unlabored. Patient changed into gown, placed on full cardiac monitor, BP cuff, and pulse ox. EKG completed. IV established. Call light within reach. Will continue to monitor.

## 2024-12-29 LAB
EKG ATRIAL RATE: 82 BPM
EKG ATRIAL RATE: 91 BPM
EKG P AXIS: 2 DEGREES
EKG P AXIS: 63 DEGREES
EKG P-R INTERVAL: 116 MS
EKG P-R INTERVAL: 144 MS
EKG Q-T INTERVAL: 372 MS
EKG Q-T INTERVAL: 374 MS
EKG QRS DURATION: 78 MS
EKG QRS DURATION: 78 MS
EKG QTC CALCULATION (BAZETT): 434 MS
EKG QTC CALCULATION (BAZETT): 460 MS
EKG R AXIS: 31 DEGREES
EKG R AXIS: 61 DEGREES
EKG T AXIS: 13 DEGREES
EKG T AXIS: 53 DEGREES
EKG VENTRICULAR RATE: 82 BPM
EKG VENTRICULAR RATE: 91 BPM
MICROORGANISM SPEC CULT: NO GROWTH
SERVICE CMNT-IMP: NORMAL
SPECIMEN DESCRIPTION: NORMAL

## 2025-01-16 ENCOUNTER — OFFICE VISIT (OUTPATIENT)
Dept: FAMILY MEDICINE CLINIC | Age: 75
End: 2025-01-16

## 2025-01-16 VITALS
BODY MASS INDEX: 35.94 KG/M2 | TEMPERATURE: 98.3 F | DIASTOLIC BLOOD PRESSURE: 82 MMHG | WEIGHT: 216 LBS | SYSTOLIC BLOOD PRESSURE: 124 MMHG | OXYGEN SATURATION: 100 % | HEART RATE: 82 BPM

## 2025-01-16 DIAGNOSIS — J01.00 ACUTE NON-RECURRENT MAXILLARY SINUSITIS: Primary | ICD-10-CM

## 2025-01-16 RX ORDER — BENZONATATE 100 MG/1
100 CAPSULE ORAL EVERY 6 HOURS PRN
Qty: 30 CAPSULE | Refills: 0 | Status: SHIPPED | OUTPATIENT
Start: 2025-01-16 | End: 2025-01-26

## 2025-01-16 RX ORDER — AMOXICILLIN 500 MG/1
500 CAPSULE ORAL 2 TIMES DAILY
Qty: 14 CAPSULE | Refills: 0 | Status: SHIPPED | OUTPATIENT
Start: 2025-01-16 | End: 2025-01-23

## 2025-01-16 SDOH — ECONOMIC STABILITY: FOOD INSECURITY: WITHIN THE PAST 12 MONTHS, YOU WORRIED THAT YOUR FOOD WOULD RUN OUT BEFORE YOU GOT MONEY TO BUY MORE.: NEVER TRUE

## 2025-01-16 SDOH — ECONOMIC STABILITY: FOOD INSECURITY: WITHIN THE PAST 12 MONTHS, THE FOOD YOU BOUGHT JUST DIDN'T LAST AND YOU DIDN'T HAVE MONEY TO GET MORE.: NEVER TRUE

## 2025-01-16 ASSESSMENT — PATIENT HEALTH QUESTIONNAIRE - PHQ9
SUM OF ALL RESPONSES TO PHQ QUESTIONS 1-9: 0
1. LITTLE INTEREST OR PLEASURE IN DOING THINGS: NOT AT ALL
SUM OF ALL RESPONSES TO PHQ QUESTIONS 1-9: 0
SUM OF ALL RESPONSES TO PHQ QUESTIONS 1-9: 0
2. FEELING DOWN, DEPRESSED OR HOPELESS: NOT AT ALL
SUM OF ALL RESPONSES TO PHQ QUESTIONS 1-9: 0
SUM OF ALL RESPONSES TO PHQ9 QUESTIONS 1 & 2: 0

## 2025-01-16 ASSESSMENT — ENCOUNTER SYMPTOMS
COUGH: 1
SINUS PRESSURE: 1
SINUS PAIN: 1

## 2025-01-16 NOTE — PROGRESS NOTES
Xiomara Monroy (:  1950) is a 74 y.o. female,Established patient, here for evaluation of the following chief complaint(s):  Sinus Problem (On going for 3 days), Cough, and Congestion         Assessment & Plan  Acute non-recurrent maxillary sinusitis   3 days of ongoing sinus infection with worsening cough with phlegm, congestion, sinus pressure and sinus pain, everyone one else with same symptoms around household that worsened, denies any fever or shortness of breath  PE: vitals stable, congestion noted otherwise lungs clear to auscultation  Discussed with patient would not need antibiotics at this time due to timeframe, and being stable, patient was not comfortable with this decision   Will prescribe antibiotics this time but discussed with patient how this can build resistance in the future   Discussed symptomatic treatment using mucinex and using afrin along with importance of staying hydrated and drinking warm liquids and having soup            No follow-ups on file.       Subjective   74 yr old here on day 3 of pain of ears, throat hurts, cough and not productive, no fever but does have a headache, significant pressure around sinuses, cough has got worse with congestion, has been spitting up yellow green and brown sputum. Denies any fever or shortness of breath. Others with the same symptoms per patient and were started on antibiotics. Had spent a few days in the car with her  who was sick.          Review of Systems   HENT:  Positive for congestion, sinus pressure and sinus pain.    Respiratory:  Positive for cough.    All other systems reviewed and are negative.         Objective   Physical Exam  Vitals reviewed.   Constitutional:       Appearance: Normal appearance.   HENT:      Head: Normocephalic and atraumatic.      Right Ear: Tympanic membrane, ear canal and external ear normal.      Left Ear: Tympanic membrane, ear canal and external ear normal.      Nose: Congestion present.

## 2025-01-22 ENCOUNTER — OFFICE VISIT (OUTPATIENT)
Dept: FAMILY MEDICINE CLINIC | Age: 75
End: 2025-01-22

## 2025-01-22 VITALS
TEMPERATURE: 98.1 F | OXYGEN SATURATION: 98 % | HEART RATE: 92 BPM | WEIGHT: 215 LBS | BODY MASS INDEX: 35.78 KG/M2 | DIASTOLIC BLOOD PRESSURE: 80 MMHG | SYSTOLIC BLOOD PRESSURE: 124 MMHG

## 2025-01-22 DIAGNOSIS — J01.00 ACUTE NON-RECURRENT MAXILLARY SINUSITIS: Primary | ICD-10-CM

## 2025-01-22 RX ORDER — AZITHROMYCIN 250 MG/1
TABLET, FILM COATED ORAL
Qty: 1 PACKET | Refills: 0 | Status: SHIPPED | OUTPATIENT
Start: 2025-01-22

## 2025-01-22 RX ORDER — METHYLPREDNISOLONE 4 MG/1
TABLET ORAL
Qty: 1 KIT | Refills: 0 | Status: SHIPPED | OUTPATIENT
Start: 2025-01-22 | End: 2025-01-28

## 2025-01-22 NOTE — PROGRESS NOTES
Xiomara Monroy (:  1950) is a 74 y.o. female,Established patient, here for evaluation of the following chief complaint(s):  Cough, Congestion (10 days of ongoing sinus infection with worsening cough with phlegm, congestion, sinus pressure and sinus pain, just finished antibiotics), and Wheezing (Sinus pressure in RT eye and cheek area)         Assessment & Plan  Acute non-recurrent maxillary sinusitis   10 days of worsening symptoms of sinusitis with pressure, congestion and cough   -patient is s/p treatment with amoxicillin  -currently ongoing cough that has been worsening along with congestion and sinus pressure and drainage   -PE: wheezing noted when patient coughs on exam, otherwise good air movement bilaterally in upper and lower lobes,   Discussed symptomatic treatment, will prescribe azithromycin to help with symptoms and short course of steroids to help with cough with wheezing           No follow-ups on file.       Subjective   74 yr old here for follow up due to ongoing sinus infection without symptom relief for 10 days now, patient is s/p Amoxicillin without symptom relief, per patient has had the cough with phlegm which has not got better, still feeling congested and notices wheezing when she is coughing, denies any fever or shortness of breath and no respiratory distress. Has used the tessalon perles with mild relief, patient denies any fever, nausea, vomiting, shortness of breath.         Review of Systems   HENT:  Positive for congestion.    Respiratory:  Positive for cough and wheezing.    All other systems reviewed and are negative.         Objective   Physical Exam  Vitals reviewed.   Constitutional:       Appearance: Normal appearance.   HENT:      Head: Normocephalic and atraumatic.      Right Ear: Tympanic membrane, ear canal and external ear normal.      Left Ear: Tympanic membrane, ear canal and external ear normal.      Nose: Congestion present.      Mouth/Throat:

## 2025-01-23 ASSESSMENT — ENCOUNTER SYMPTOMS
COUGH: 1
WHEEZING: 1

## 2025-02-25 ENCOUNTER — OFFICE VISIT (OUTPATIENT)
Dept: FAMILY MEDICINE CLINIC | Age: 75
End: 2025-02-25

## 2025-02-25 VITALS
WEIGHT: 215 LBS | HEART RATE: 109 BPM | DIASTOLIC BLOOD PRESSURE: 84 MMHG | OXYGEN SATURATION: 98 % | BODY MASS INDEX: 35.78 KG/M2 | SYSTOLIC BLOOD PRESSURE: 124 MMHG

## 2025-02-25 DIAGNOSIS — M79.605 LEG PAIN, ANTERIOR, LEFT: ICD-10-CM

## 2025-02-25 DIAGNOSIS — C49.A2 GASTROINTESTINAL STROMAL TUMOR OF STOMACH (HCC): ICD-10-CM

## 2025-02-25 DIAGNOSIS — I10 ESSENTIAL HYPERTENSION: Primary | ICD-10-CM

## 2025-02-25 ASSESSMENT — ENCOUNTER SYMPTOMS
SHORTNESS OF BREATH: 0
COUGH: 0

## 2025-02-25 NOTE — PROGRESS NOTES
ENDOSCOPY  09/13/2023    EGD BIOPSY performed by Vick Mane MD at [facility] [[email or web address]] OR    UPPER GASTROINTESTINAL ENDOSCOPY N/A 09/03/2024    ENDOSCOPIC ULTRASOUND, PATHOLOGY performed by Vick Mane MD at [facility] [[email or web address]] OR    UPPER GASTROINTESTINAL ENDOSCOPY N/A 09/03/2024    ESOPHAGOGASTRODUODENOSCOPY performed by Vick Mane MD at [facility] [[email or web address]] OR    URETER SURGERY Left 10/23/2024    HOLMIUM LASER STANDBY CYSTOSCOPY, URETEROSCOPY, STENT PLACEMENT performed by Sander Anders MD at [facility] [[email or web address]] OR    URETEROSCOPY Left 10/23/2024    URETEROSCOPY, STENT PLACEMENT     Family History   Problem Relation Age of Onset    Heart Disease Mother     Diabetes Father     Diabetes Brother      Social History     Tobacco Use    Smoking status: Never     Passive exposure: Past    Smokeless tobacco: Never   Substance Use Topics    Alcohol use: Yes     Alcohol/week: 4.0 standard drinks of alcohol     Types: 4 Glasses of wine per week     Comment: 2 drinks weekly      Current Outpatient Medications   Medication Sig Dispense Refill    potassium citrate (UROCIT-K) 10 MEQ (1080 MG) extended release tablet TAKE TWO TABLETS BY MOUTH THREE TIMES A DAY WITH A MEAL 180 tablet 3    lisinopril-hydroCHLOROthiazide (PRINZIDE;ZESTORETIC) 20-12.5 MG per tablet Take 1 tablet by mouth daily 90 tablet 1    valACYclovir (VALTREX) 1 g tablet take 2 tablets by mouth AT ONSET repeat in 12 hours for OUTBREAKS...  (REFER TO PRESCRIPTION NOTES).      cyclobenzaprine (FLEXERIL) 10 MG tablet Take 1 tablet by mouth 2 times daily as needed      HYDROcodone-acetaminophen (NORCO) 5-325 MG per tablet take 1 tablet by mouth every 6 hours AS NEEDED FOR PAIN      Handicap Placard MISC by Does not apply route 1 each 0    azithromycin (ZITHROMAX) 250 MG tablet Take 2 tabs (500 mg) on Day 1, and take 1 tab (250 mg) on days 2 through 5. (Patient not taking: Reported on 2/25/2025) 1 packet 0     No current facility-administered medications for this visit.       Allergies

## 2025-03-03 ENCOUNTER — OFFICE VISIT (OUTPATIENT)
Dept: UROLOGY | Age: 75
End: 2025-03-03
Payer: MEDICARE

## 2025-03-03 VITALS
WEIGHT: 215 LBS | TEMPERATURE: 98 F | DIASTOLIC BLOOD PRESSURE: 78 MMHG | OXYGEN SATURATION: 99 % | HEIGHT: 65 IN | HEART RATE: 95 BPM | BODY MASS INDEX: 35.82 KG/M2 | SYSTOLIC BLOOD PRESSURE: 126 MMHG

## 2025-03-03 DIAGNOSIS — N20.0 KIDNEY STONE: Primary | ICD-10-CM

## 2025-03-03 DIAGNOSIS — R39.15 URGENCY OF URINATION: ICD-10-CM

## 2025-03-03 DIAGNOSIS — R35.0 URINARY FREQUENCY: ICD-10-CM

## 2025-03-03 PROCEDURE — 1159F MED LIST DOCD IN RCRD: CPT | Performed by: UROLOGY

## 2025-03-03 PROCEDURE — 1123F ACP DISCUSS/DSCN MKR DOCD: CPT | Performed by: UROLOGY

## 2025-03-03 PROCEDURE — 99213 OFFICE O/P EST LOW 20 MIN: CPT | Performed by: UROLOGY

## 2025-03-03 PROCEDURE — 1036F TOBACCO NON-USER: CPT | Performed by: UROLOGY

## 2025-03-03 PROCEDURE — 3074F SYST BP LT 130 MM HG: CPT | Performed by: UROLOGY

## 2025-03-03 PROCEDURE — G8427 DOCREV CUR MEDS BY ELIG CLIN: HCPCS | Performed by: UROLOGY

## 2025-03-03 PROCEDURE — G8399 PT W/DXA RESULTS DOCUMENT: HCPCS | Performed by: UROLOGY

## 2025-03-03 PROCEDURE — 3017F COLORECTAL CA SCREEN DOC REV: CPT | Performed by: UROLOGY

## 2025-03-03 PROCEDURE — 3078F DIAST BP <80 MM HG: CPT | Performed by: UROLOGY

## 2025-03-03 PROCEDURE — G8417 CALC BMI ABV UP PARAM F/U: HCPCS | Performed by: UROLOGY

## 2025-03-03 PROCEDURE — 1090F PRES/ABSN URINE INCON ASSESS: CPT | Performed by: UROLOGY

## 2025-03-03 ASSESSMENT — ENCOUNTER SYMPTOMS
RESPIRATORY NEGATIVE: 1
SHORTNESS OF BREATH: 0
NAUSEA: 0
GASTROINTESTINAL NEGATIVE: 1
EYES NEGATIVE: 1
ALLERGIC/IMMUNOLOGIC NEGATIVE: 1
EYE PAIN: 0
BACK PAIN: 0
ABDOMINAL PAIN: 0
WHEEZING: 0
EYE REDNESS: 0
VOMITING: 0
COUGH: 0

## 2025-03-03 NOTE — PROGRESS NOTES
[facility] PHYSICIANS [facility]  [address]  [place] [address]  Dept: [phone]    [facility] Urology Office Note - Established    Patient:  Xiomara Monroy  YOB: 1950  Date: 3/3/2025    The patient is a 74 y.o. female whopresents today for evaluation of the following problems:   Chief Complaint   Patient presents with    Flank Pain      3 months, OAB, flank pain           HPI  This is a very pleasant 74-year-old female who has a history of stones and overactive bladder.  We had done stone surgery on her last fall and she is doing significantly better with regards to pain.  Even her overactive bladder symptoms are better.  Currently she is not bothered by any of her symptoms  Summary of old records: N/A    Additional History: N/A    Procedures Today: N/A    Urinalysis today:  No results found for this visit on 03/03/25.    Imaging Reviewed during this Office Visit: none  (results were independently reviewed by physician and radiology report verified)    AUA Symptom Score (3/3/2025):                               Last BUN and creatinine:  Lab Results   Component Value Date    BUN 27 (H) 12/28/2024     Lab Results   Component Value Date    CREATININE 1.0 (H) 12/28/2024       Additional Lab/Culture results: none    PAST MEDICAL, FAMILY AND SOCIAL HISTORY UPDATE:  Past Medical History:   Diagnosis Date    Arthritis     osteoarthritis    Chronic back pain     COVID-19 2020    tiredness X 7 days. No treatment or hospitalization.    Gastric nodule     patient reports    GERD (gastroesophageal reflux disease)     GIST (gastrointestinal stromal tumor), non-malignant     History of stomach ulcers     Hypertension     Kidney stones     Obesity     Pain     R/L kidney/back    Under care of team     Urology Dr. Chago OLSON/ [place]/ last seen 8-2023    Under care of team     ENT Dr. Tiffany OLSON/ [place]/ last seen 8-2023    Wellness examination

## 2025-04-28 ENCOUNTER — OFFICE VISIT (OUTPATIENT)
Dept: FAMILY MEDICINE CLINIC | Age: 75
End: 2025-04-28
Payer: MEDICARE

## 2025-04-28 VITALS
SYSTOLIC BLOOD PRESSURE: 136 MMHG | RESPIRATION RATE: 20 BRPM | BODY MASS INDEX: 35.82 KG/M2 | OXYGEN SATURATION: 98 % | DIASTOLIC BLOOD PRESSURE: 88 MMHG | WEIGHT: 215 LBS | HEIGHT: 65 IN | HEART RATE: 100 BPM

## 2025-04-28 DIAGNOSIS — R21 SKIN ERUPTION: Primary | ICD-10-CM

## 2025-04-28 PROCEDURE — 99214 OFFICE O/P EST MOD 30 MIN: CPT | Performed by: NURSE PRACTITIONER

## 2025-04-28 PROCEDURE — 3075F SYST BP GE 130 - 139MM HG: CPT | Performed by: NURSE PRACTITIONER

## 2025-04-28 PROCEDURE — G8427 DOCREV CUR MEDS BY ELIG CLIN: HCPCS | Performed by: NURSE PRACTITIONER

## 2025-04-28 PROCEDURE — G8399 PT W/DXA RESULTS DOCUMENT: HCPCS | Performed by: NURSE PRACTITIONER

## 2025-04-28 PROCEDURE — 3017F COLORECTAL CA SCREEN DOC REV: CPT | Performed by: NURSE PRACTITIONER

## 2025-04-28 PROCEDURE — 1123F ACP DISCUSS/DSCN MKR DOCD: CPT | Performed by: NURSE PRACTITIONER

## 2025-04-28 PROCEDURE — G8417 CALC BMI ABV UP PARAM F/U: HCPCS | Performed by: NURSE PRACTITIONER

## 2025-04-28 PROCEDURE — 1036F TOBACCO NON-USER: CPT | Performed by: NURSE PRACTITIONER

## 2025-04-28 PROCEDURE — 3079F DIAST BP 80-89 MM HG: CPT | Performed by: NURSE PRACTITIONER

## 2025-04-28 PROCEDURE — 1090F PRES/ABSN URINE INCON ASSESS: CPT | Performed by: NURSE PRACTITIONER

## 2025-04-28 RX ORDER — DOXYCYCLINE HYCLATE 100 MG
100 TABLET ORAL 2 TIMES DAILY
Qty: 14 TABLET | Refills: 0 | Status: SHIPPED | OUTPATIENT
Start: 2025-04-28 | End: 2025-05-05

## 2025-04-28 RX ORDER — PERMETHRIN 50 MG/G
CREAM TOPICAL
Qty: 60 G | Refills: 0 | Status: SHIPPED | OUTPATIENT
Start: 2025-04-28

## 2025-04-28 NOTE — PROGRESS NOTES
Xiomara Monroy (:  1950) is a 75 y.o. female,Established patient, here for evaluation of the following chief complaint(s):  Rash (Bilateral forearms. X1 month. Had it on abdomen and it went away. //)         Assessment & Plan  Skin eruption    Reported symptoms of intense nocturnal itching suggestive of scabies infection in addition to linear lesions. Will treat with topical permethrin cream as indicated. Rx for doxy d/t concern of cellulitis component. Follow up in 1 week to re evaluate.     Orders:    doxycycline hyclate (VIBRA-TABS) 100 MG tablet; Take 1 tablet by mouth 2 times daily for 7 days    permethrin (ELIMITE) 5 % cream; Apply once topically in between fingers leave on 8 hours and wash off            Return in about 1 week (around 2025) for Rash f/u .       Subjective   Presents for acute visit    Reports rash to bilateral arms for one month  Initially rash was present to abdomen, but that resolved after cleaning it with alcohol  Itchiness will wake her up at night, but it is not anymore itchy than it is during the day  Has not changed detergent, soap, body lotion   Has tried otc cortisone 10 for rash, helps a little bit with itch     Follows with neurosurgery and pain clinic for chronic neck pain           Review of Systems   Constitutional:  Negative for chills and fever.   Skin:  Positive for rash.          Objective   Physical Exam  Vitals and nursing note reviewed.   Constitutional:       Appearance: Normal appearance.   HENT:      Head: Normocephalic.      Right Ear: Hearing normal.      Left Ear: Hearing normal.      Nose: Nose normal.      Mouth/Throat:      Mouth: Mucous membranes are moist.      Pharynx: Oropharynx is clear.   Eyes:      Extraocular Movements: Extraocular movements intact.      Conjunctiva/sclera: Conjunctivae normal.      Pupils: Pupils are equal, round, and reactive to light.   Cardiovascular:      Rate and Rhythm: Normal rate and regular rhythm.

## 2025-05-05 ENCOUNTER — OFFICE VISIT (OUTPATIENT)
Dept: FAMILY MEDICINE CLINIC | Age: 75
End: 2025-05-05

## 2025-05-05 VITALS
DIASTOLIC BLOOD PRESSURE: 78 MMHG | WEIGHT: 215 LBS | SYSTOLIC BLOOD PRESSURE: 132 MMHG | BODY MASS INDEX: 35.82 KG/M2 | RESPIRATION RATE: 20 BRPM | HEIGHT: 65 IN | HEART RATE: 70 BPM | OXYGEN SATURATION: 98 %

## 2025-05-05 DIAGNOSIS — R21 SKIN ERUPTION: Primary | ICD-10-CM

## 2025-05-05 RX ORDER — TRIAMCINOLONE ACETONIDE 0.25 MG/G
CREAM TOPICAL
Qty: 454 G | Refills: 0 | Status: SHIPPED | OUTPATIENT
Start: 2025-05-05

## 2025-05-05 RX ORDER — PREDNISONE 20 MG/1
TABLET ORAL
Qty: 30 TABLET | Refills: 0 | Status: SHIPPED | OUTPATIENT
Start: 2025-05-05 | End: 2025-05-15

## 2025-05-05 NOTE — PROGRESS NOTES
Xiomara Monroy (:  1950) is a 75 y.o. female,Established patient, here for evaluation of the following chief complaint(s):  Follow-up (1 week rash-same no change. //)         Assessment & Plan  Skin eruption  Minimal improvement after doxy, permethrin cream   Rx for 10 day prednisone taper, topical triamcinolone BID x 10   Follow up with dermatology     Orders:    predniSONE (DELTASONE) 20 MG tablet; 4 tabs po daily for 4 days, then 3 po daily for 3 days, then 2 po daily for 2 days, then 1 po daily for 1 day.    triamcinolone (KENALOG) 0.025 % cream; Apply topically 2 times daily to bilateral arms        2025      No follow-ups on file.       Subjective   Presents for 1 week follow up to review rash    Reports minimal improvement in rash to b/l arms after doxy and permethrin cream  She admits she has been more intentional about not scratching - but notes her entire body seems to feel like it is itching again.  Antibiotic did clear up sinus issues she was having  Established with Dr. Wisdom         Review of Systems   Skin:  Positive for rash.          Objective   Physical Exam  Vitals and nursing note reviewed.   Constitutional:       Appearance: Normal appearance.   HENT:      Head: Normocephalic.      Right Ear: Hearing normal.      Left Ear: Hearing normal.      Nose: Nose normal.      Mouth/Throat:      Mouth: Mucous membranes are moist.      Pharynx: Oropharynx is clear.   Eyes:      Extraocular Movements: Extraocular movements intact.      Conjunctiva/sclera: Conjunctivae normal.      Pupils: Pupils are equal, round, and reactive to light.   Cardiovascular:      Rate and Rhythm: Normal rate and regular rhythm.      Pulses: Normal pulses.      Heart sounds: Normal heart sounds.   Pulmonary:      Effort: Pulmonary effort is normal.      Breath sounds: Normal breath sounds.   Abdominal:      General: Abdomen is flat. Bowel sounds are normal.      Palpations: Abdomen is

## (undated) DEVICE — FORCEPS BX L240CM WRK CHN 2.8MM STD CAP W/ NDL MIC MESH

## (undated) DEVICE — STANDARD HYPODERMIC NEEDLE,POLYPROPYLENE HUB: Brand: MONOJECT

## (undated) DEVICE — GLOVE ORANGE PI 8 1/2   MSG9085

## (undated) DEVICE — GLOVE ORANGE PI 7   MSG9070

## (undated) DEVICE — LIMB HOLDER, WRIST/ANKLE: Brand: DEROYAL

## (undated) DEVICE — Z DISCONTINUED BY MEDLINE USE 2711682 TRAY SKIN PREP DRY W/ PREM GLV

## (undated) DEVICE — GUIDEWIRE URO L150CM DIA .035IN STIFF NIT HYDRPHL STR TIP

## (undated) DEVICE — ADAPTER URO SCP UROLOK LL

## (undated) DEVICE — Z DISCONTINUED USE 2423037 APPLICATOR MEDICATED 3 CC CHLORHEXIDINE GLUC 2% CHLORAPREP

## (undated) DEVICE — STRAP ARMBRD W1.5XL32IN FOAM STR YET SFT W/ HK AND LOOP

## (undated) DEVICE — ADHESIVE SKIN CLSR 0.7ML TOP DERMBND ADV

## (undated) DEVICE — HOOD: Brand: FLYTE

## (undated) DEVICE — CANNULA IV 18GA L15IN BLNT FILL LUERLOCK HUB MJCT

## (undated) DEVICE — Device

## (undated) DEVICE — ZIPPERED TOGA, X-LARGE: Brand: FLYTE

## (undated) DEVICE — GOWN,AURORA,NONRNF,XL,30/CS: Brand: MEDLINE

## (undated) DEVICE — Z CONVERTED USE 2162213 APPLICATOR PREP 4OZ CHG 4% BACTOSHIELD USE FOR HND WSH AND

## (undated) DEVICE — PROTECTOR ULN NRV PUR FOAM HK LOOP STRP ANATOMICALLY

## (undated) DEVICE — STERILE PATIENT PROTECTIVE PAD FOR IMP® KNEE POSITIONERS & COHESIVE WRAP (10 / CASE): Brand: DE MAYO KNEE POSITIONER®

## (undated) DEVICE — Z DISCONTINUED PER MEDLINE USE 2741943 DRESSING AQUACEL 10 IN ALG W9XL25CM SIL CVR WTRPRF VIR BACT BARR ANTIMIC

## (undated) DEVICE — GOWN,SIRUS,NONRNF,SETINSLV,XL,20/CS: Brand: MEDLINE

## (undated) DEVICE — BLADE ES L6IN ELASTOMERIC COAT EXT DURABLE BEND UPTO 90DEG

## (undated) DEVICE — Z DISCONTINUED CATHETER BALLOON DIL BILI 0.035 IN 5 FRX180 CM MAXFORC [M00567410] [STRYKER CORP]

## (undated) DEVICE — SVMMC ORTH SPL DRP PK

## (undated) DEVICE — GEL US 20GM NONIRRITATING OVERWRAPPED FILE PCH TRNSMIT

## (undated) DEVICE — GLOVE ORANGE PI 7 1/2   MSG9075

## (undated) DEVICE — SINGLE ACTION PUMPING SYSTEM

## (undated) DEVICE — 6619 2 PTNT ISO SYS INCISE AREA&LT;(&GT;&&LT;)&GT;P: Brand: STERI-DRAPE™ IOBAN™ 2

## (undated) DEVICE — SYSTEM BX 25GA FN NDL SIX DST CUT EDG SHARKCORE

## (undated) DEVICE — 3 ML SYRINGE LUER-LOCK TIP: Brand: MONOJECT

## (undated) DEVICE — GLOVE ORANGE PI 8   MSG9080

## (undated) DEVICE — BIPOLAR SEALER 23-112-1 AQM 6.0: Brand: AQUAMANTYS ®

## (undated) DEVICE — SYRINGE, LUER LOCK, 10ML: Brand: MEDLINE

## (undated) DEVICE — NEEDLE SPNL 22GA L3.5IN BLK HUB S STL REG WALL FIT STYL W/

## (undated) DEVICE — DRAPE,REIN 53X77,STERILE: Brand: MEDLINE

## (undated) DEVICE — MASTISOL ADHESIVE LIQ 2/3ML

## (undated) DEVICE — SUTURE STRATAFIX SPRL SZ 1 L14IN ABSRB VLT L48CM CTX 1/2 SXPD2B405

## (undated) DEVICE — SUTURE STRATAFIX SPRL SZ 2-0 L9IN ABSRB VLT MH L36MM 1/2 SXPD2B408

## (undated) DEVICE — SUTURE ABSRB BRAID COAT UD CP NO 2 27IN VCRL J195H

## (undated) DEVICE — SUTURE STRATAFIX SPRL SZ 3-0 L5IN ABSRB UD FS L26MM 3/8 CIR SXMP2B411

## (undated) DEVICE — NEEDLE ECHOGENIC 20GA L4IN INSUL W/ 30DEG BVL EXTN SET

## (undated) DEVICE — SINGLE-USE DIGITAL FLEXIBLE URETEROSCOPE: Brand: LITHOVUE